# Patient Record
Sex: FEMALE | Race: WHITE | NOT HISPANIC OR LATINO | Employment: OTHER | URBAN - METROPOLITAN AREA
[De-identification: names, ages, dates, MRNs, and addresses within clinical notes are randomized per-mention and may not be internally consistent; named-entity substitution may affect disease eponyms.]

---

## 2017-02-21 ENCOUNTER — GENERIC CONVERSION - ENCOUNTER (OUTPATIENT)
Dept: OTHER | Facility: OTHER | Age: 52
End: 2017-02-21

## 2017-02-21 LAB
25(OH)D3 SERPL-MCNC: 24.8 NG/ML (ref 30–100)
AMBIG ABBREV CMP14 DEFAULT (HISTORICAL): NORMAL
CHOLEST SERPL-MCNC: 238 MG/DL (ref 100–199)
HDLC SERPL-MCNC: 40 MG/DL
LDL/HDL RATIO (HISTORICAL): 3.4 RATIO UNITS (ref 0–3.2)
LDLC SERPL CALC-MCNC: 134 MG/DL (ref 0–99)
TRIGL SERPL-MCNC: 320 MG/DL (ref 0–149)
VLDLC SERPL CALC-MCNC: 64 MG/DL (ref 5–40)

## 2017-02-22 ENCOUNTER — GENERIC CONVERSION - ENCOUNTER (OUTPATIENT)
Dept: OTHER | Facility: OTHER | Age: 52
End: 2017-02-22

## 2017-02-22 LAB
T4 FREE SERPL-MCNC: 0.78 NG/DL (ref 0.82–1.77)
TSH SERPL DL<=0.05 MIU/L-ACNC: 1.82 UIU/ML (ref 0.45–4.5)
WRITTEN AUTHORIZATION (HISTORICAL): NORMAL

## 2017-02-23 ENCOUNTER — ALLSCRIPTS OFFICE VISIT (OUTPATIENT)
Dept: OTHER | Facility: OTHER | Age: 52
End: 2017-02-23

## 2017-02-23 DIAGNOSIS — Z12.31 ENCOUNTER FOR SCREENING MAMMOGRAM FOR MALIGNANT NEOPLASM OF BREAST: ICD-10-CM

## 2017-02-23 LAB
A/G RATIO (HISTORICAL): 1.7 (ref 1.1–2.5)
ALBUMIN SERPL BCP-MCNC: 4.2 G/DL (ref 3.5–5.5)
ALP SERPL-CCNC: 51 IU/L (ref 39–117)
ALT SERPL W P-5'-P-CCNC: 21 IU/L (ref 0–32)
AST SERPL W P-5'-P-CCNC: 19 IU/L (ref 0–40)
BILIRUB SERPL-MCNC: <0.2 MG/DL (ref 0–1.2)
BUN SERPL-MCNC: 16 MG/DL (ref 6–24)
BUN/CREA RATIO (HISTORICAL): 21 (ref 9–23)
CALCIUM SERPL-MCNC: 9.1 MG/DL (ref 8.7–10.2)
CHLORIDE SERPL-SCNC: 104 MMOL/L (ref 96–106)
CO2 SERPL-SCNC: 22 MMOL/L (ref 18–29)
CREAT SERPL-MCNC: 0.76 MG/DL (ref 0.57–1)
EGFR AFRICAN AMERICAN (HISTORICAL): 105 ML/MIN/1.73
EGFR-AMERICAN CALC (HISTORICAL): 91 ML/MIN/1.73
GLUCOSE SERPL-MCNC: 111 MG/DL (ref 65–99)
POTASSIUM SERPL-SCNC: 4.5 MMOL/L (ref 3.5–5.2)
SODIUM SERPL-SCNC: 140 MMOL/L (ref 134–144)
TOT. GLOBULIN, SERUM (HISTORICAL): 2.5 G/DL (ref 1.5–4.5)
TOTAL PROTEIN (HISTORICAL): 6.7 G/DL (ref 6–8.5)

## 2017-03-08 ENCOUNTER — GENERIC CONVERSION - ENCOUNTER (OUTPATIENT)
Dept: OTHER | Facility: OTHER | Age: 52
End: 2017-03-08

## 2017-03-08 LAB
EST. AVERAGE GLUCOSE BLD GHB EST-MCNC: 120 MG/DL
HBA1C MFR BLD HPLC: 5.8 %HB

## 2017-03-30 ENCOUNTER — ALLSCRIPTS OFFICE VISIT (OUTPATIENT)
Dept: OTHER | Facility: OTHER | Age: 52
End: 2017-03-30

## 2017-06-29 ENCOUNTER — ALLSCRIPTS OFFICE VISIT (OUTPATIENT)
Dept: OTHER | Facility: OTHER | Age: 52
End: 2017-06-29

## 2017-06-29 DIAGNOSIS — R06.02 SHORTNESS OF BREATH: ICD-10-CM

## 2017-06-29 DIAGNOSIS — R07.9 CHEST PAIN: ICD-10-CM

## 2017-07-13 ENCOUNTER — GENERIC CONVERSION - ENCOUNTER (OUTPATIENT)
Dept: OTHER | Facility: OTHER | Age: 52
End: 2017-07-13

## 2017-07-13 LAB
A/G RATIO (HISTORICAL): 1.7 (ref 1.2–2.2)
ALBUMIN SERPL BCP-MCNC: 4.3 G/DL (ref 3.5–5.5)
ALP SERPL-CCNC: 55 IU/L (ref 39–117)
ALT SERPL W P-5'-P-CCNC: 34 IU/L (ref 0–32)
AMBIG ABBREV LP DEFAULT (HISTORICAL): NORMAL
AST SERPL W P-5'-P-CCNC: 22 IU/L (ref 0–40)
BILIRUB SERPL-MCNC: <0.2 MG/DL (ref 0–1.2)
BUN SERPL-MCNC: 20 MG/DL (ref 6–24)
BUN/CREA RATIO (HISTORICAL): 24 (ref 9–23)
CALCIUM SERPL-MCNC: 9.1 MG/DL (ref 8.7–10.2)
CHLORIDE SERPL-SCNC: 103 MMOL/L (ref 96–106)
CHOLEST SERPL-MCNC: 218 MG/DL (ref 100–199)
CREAT SERPL-MCNC: 0.85 MG/DL (ref 0.57–1)
EGFR AFRICAN AMERICAN (HISTORICAL): 92 ML/MIN/1.73
EGFR-AMERICAN CALC (HISTORICAL): 80 ML/MIN/1.73
GLUCOSE SERPL-MCNC: 93 MG/DL (ref 65–99)
HBA1C MFR BLD HPLC: 5.7 % (ref 4.8–5.6)
HDLC SERPL-MCNC: 37 MG/DL
LDLC SERPL CALC-MCNC: 115 MG/DL (ref 0–99)
POTASSIUM SERPL-SCNC: 4.4 MMOL/L (ref 3.5–5.2)
SODIUM SERPL-SCNC: 139 MMOL/L (ref 134–144)
TOT. GLOBULIN, SERUM (HISTORICAL): 2.5 G/DL (ref 1.5–4.5)
TOTAL PROTEIN (HISTORICAL): 6.8 G/DL (ref 6–8.5)
TRIGL SERPL-MCNC: 331 MG/DL (ref 0–149)
VLDLC SERPL CALC-MCNC: 66 MG/DL (ref 5–40)

## 2017-07-14 ENCOUNTER — GENERIC CONVERSION - ENCOUNTER (OUTPATIENT)
Dept: OTHER | Facility: OTHER | Age: 52
End: 2017-07-14

## 2017-11-01 ENCOUNTER — GENERIC CONVERSION - ENCOUNTER (OUTPATIENT)
Dept: OTHER | Facility: OTHER | Age: 52
End: 2017-11-01

## 2018-01-13 VITALS
HEART RATE: 100 BPM | WEIGHT: 238 LBS | DIASTOLIC BLOOD PRESSURE: 76 MMHG | BODY MASS INDEX: 37.35 KG/M2 | SYSTOLIC BLOOD PRESSURE: 114 MMHG | HEIGHT: 67 IN | RESPIRATION RATE: 18 BRPM

## 2018-01-14 ENCOUNTER — HOSPITAL ENCOUNTER (INPATIENT)
Facility: HOSPITAL | Age: 53
LOS: 1 days | Discharge: HOME | DRG: 287 | End: 2018-01-17
Attending: EMERGENCY MEDICINE | Admitting: INTERNAL MEDICINE
Payer: MEDICARE

## 2018-01-14 ENCOUNTER — APPOINTMENT (EMERGENCY)
Dept: RADIOLOGY | Facility: HOSPITAL | Age: 53
DRG: 287 | End: 2018-01-14
Payer: MEDICARE

## 2018-01-14 VITALS
HEART RATE: 76 BPM | TEMPERATURE: 96.7 F | SYSTOLIC BLOOD PRESSURE: 110 MMHG | RESPIRATION RATE: 12 BRPM | WEIGHT: 239.13 LBS | BODY MASS INDEX: 37.49 KG/M2 | DIASTOLIC BLOOD PRESSURE: 78 MMHG

## 2018-01-14 VITALS
HEART RATE: 100 BPM | RESPIRATION RATE: 18 BRPM | WEIGHT: 232.38 LBS | BODY MASS INDEX: 36.47 KG/M2 | DIASTOLIC BLOOD PRESSURE: 86 MMHG | SYSTOLIC BLOOD PRESSURE: 128 MMHG | HEIGHT: 67 IN

## 2018-01-14 DIAGNOSIS — R94.39 ABNORMAL STRESS TEST: ICD-10-CM

## 2018-01-14 DIAGNOSIS — R07.9 CHEST PAIN, UNSPECIFIED TYPE: Primary | ICD-10-CM

## 2018-01-14 PROBLEM — I10 HYPERTENSION: Status: ACTIVE | Noted: 2018-01-14

## 2018-01-14 PROBLEM — E78.5 HYPERLIPIDEMIA: Status: ACTIVE | Noted: 2018-01-14

## 2018-01-14 LAB
ANION GAP SERPL CALCULATED.3IONS-SCNC: 11 MMOL/L (ref 4–13)
BASOPHILS # BLD AUTO: 0.08 THOUSANDS/ΜL (ref 0–0.1)
BASOPHILS NFR BLD AUTO: 1 % (ref 0–1)
BUN SERPL-MCNC: 13 MG/DL (ref 5–25)
CALCIUM SERPL-MCNC: 9.1 MG/DL (ref 8.3–10.1)
CHLORIDE SERPL-SCNC: 105 MMOL/L (ref 100–108)
CO2 SERPL-SCNC: 24 MMOL/L (ref 21–32)
CREAT SERPL-MCNC: 0.89 MG/DL (ref 0.6–1.3)
EOSINOPHIL # BLD AUTO: 0.28 THOUSAND/ΜL (ref 0–0.61)
EOSINOPHIL NFR BLD AUTO: 3 % (ref 0–6)
ERYTHROCYTE [DISTWIDTH] IN BLOOD BY AUTOMATED COUNT: 12.6 % (ref 11.6–15.1)
GFR SERPL CREATININE-BSD FRML MDRD: 75 ML/MIN/1.73SQ M
GLUCOSE SERPL-MCNC: 100 MG/DL (ref 65–140)
HCT VFR BLD AUTO: 40.9 % (ref 34.8–46.1)
HGB BLD-MCNC: 13.8 G/DL (ref 11.5–15.4)
LYMPHOCYTES # BLD AUTO: 2.28 THOUSANDS/ΜL (ref 0.6–4.47)
LYMPHOCYTES NFR BLD AUTO: 22 % (ref 14–44)
MCH RBC QN AUTO: 29.1 PG (ref 26.8–34.3)
MCHC RBC AUTO-ENTMCNC: 33.7 G/DL (ref 31.4–37.4)
MCV RBC AUTO: 86 FL (ref 82–98)
MONOCYTES # BLD AUTO: 0.85 THOUSAND/ΜL (ref 0.17–1.22)
MONOCYTES NFR BLD AUTO: 8 % (ref 4–12)
NEUTROPHILS # BLD AUTO: 6.8 THOUSANDS/ΜL (ref 1.85–7.62)
NEUTS SEG NFR BLD AUTO: 66 % (ref 43–75)
PLATELET # BLD AUTO: 302 THOUSANDS/UL (ref 149–390)
PLATELET # BLD AUTO: 339 THOUSANDS/UL (ref 149–390)
PMV BLD AUTO: 10.9 FL (ref 8.9–12.7)
PMV BLD AUTO: 11.6 FL (ref 8.9–12.7)
POTASSIUM SERPL-SCNC: 4.1 MMOL/L (ref 3.5–5.3)
RBC # BLD AUTO: 4.74 MILLION/UL (ref 3.81–5.12)
SODIUM SERPL-SCNC: 140 MMOL/L (ref 136–145)
TROPONIN I SERPL-MCNC: <0.02 NG/ML
TSH SERPL DL<=0.05 MIU/L-ACNC: 3.02 UIU/ML (ref 0.36–3.74)
WBC # BLD AUTO: 10.29 THOUSAND/UL (ref 4.31–10.16)

## 2018-01-14 PROCEDURE — 84484 ASSAY OF TROPONIN QUANT: CPT | Performed by: EMERGENCY MEDICINE

## 2018-01-14 PROCEDURE — 99285 EMERGENCY DEPT VISIT HI MDM: CPT

## 2018-01-14 PROCEDURE — 71045 X-RAY EXAM CHEST 1 VIEW: CPT

## 2018-01-14 PROCEDURE — 93005 ELECTROCARDIOGRAM TRACING: CPT | Performed by: EMERGENCY MEDICINE

## 2018-01-14 PROCEDURE — 93005 ELECTROCARDIOGRAM TRACING: CPT

## 2018-01-14 PROCEDURE — 36415 COLL VENOUS BLD VENIPUNCTURE: CPT | Performed by: EMERGENCY MEDICINE

## 2018-01-14 PROCEDURE — 84443 ASSAY THYROID STIM HORMONE: CPT | Performed by: INTERNAL MEDICINE

## 2018-01-14 PROCEDURE — 85049 AUTOMATED PLATELET COUNT: CPT | Performed by: INTERNAL MEDICINE

## 2018-01-14 PROCEDURE — 80048 BASIC METABOLIC PNL TOTAL CA: CPT | Performed by: EMERGENCY MEDICINE

## 2018-01-14 PROCEDURE — 85025 COMPLETE CBC W/AUTO DIFF WBC: CPT | Performed by: EMERGENCY MEDICINE

## 2018-01-14 RX ORDER — CLONAZEPAM 0.5 MG/1
0.5 TABLET ORAL
Status: DISCONTINUED | OUTPATIENT
Start: 2018-01-14 | End: 2018-01-17 | Stop reason: HOSPADM

## 2018-01-14 RX ORDER — FLUOXETINE HYDROCHLORIDE 40 MG/1
1 CAPSULE ORAL DAILY
COMMUNITY
Start: 2011-07-13

## 2018-01-14 RX ORDER — CHOLECALCIFEROL (VITAMIN D3) 10 MCG
1 TABLET ORAL DAILY
Status: DISCONTINUED | OUTPATIENT
Start: 2018-01-15 | End: 2018-01-17 | Stop reason: HOSPADM

## 2018-01-14 RX ORDER — MORPHINE SULFATE 2 MG/ML
2 INJECTION, SOLUTION INTRAMUSCULAR; INTRAVENOUS EVERY 4 HOURS PRN
Status: DISCONTINUED | OUTPATIENT
Start: 2018-01-14 | End: 2018-01-17 | Stop reason: HOSPADM

## 2018-01-14 RX ORDER — ASPIRIN 81 MG/1
162 TABLET, CHEWABLE ORAL ONCE
Status: COMPLETED | OUTPATIENT
Start: 2018-01-14 | End: 2018-01-14

## 2018-01-14 RX ORDER — MELATONIN
1000 DAILY
Status: DISCONTINUED | OUTPATIENT
Start: 2018-01-15 | End: 2018-01-17 | Stop reason: HOSPADM

## 2018-01-14 RX ORDER — BUPROPION HYDROCHLORIDE 100 MG/1
200 TABLET, EXTENDED RELEASE ORAL DAILY
Status: DISCONTINUED | OUTPATIENT
Start: 2018-01-15 | End: 2018-01-17 | Stop reason: HOSPADM

## 2018-01-14 RX ORDER — CALCIUM CARBONATE 500(1250)
1 TABLET ORAL
Status: DISCONTINUED | OUTPATIENT
Start: 2018-01-15 | End: 2018-01-17 | Stop reason: HOSPADM

## 2018-01-14 RX ORDER — HEPARIN SODIUM 5000 [USP'U]/ML
5000 INJECTION, SOLUTION INTRAVENOUS; SUBCUTANEOUS EVERY 8 HOURS SCHEDULED
Status: DISCONTINUED | OUTPATIENT
Start: 2018-01-14 | End: 2018-01-17 | Stop reason: HOSPADM

## 2018-01-14 RX ORDER — LABETALOL 100 MG/1
1 TABLET, FILM COATED ORAL EVERY 12 HOURS
COMMUNITY
Start: 2016-06-30 | End: 2018-06-07 | Stop reason: SDUPTHER

## 2018-01-14 RX ORDER — CLONAZEPAM 0.5 MG/1
1 TABLET ORAL
COMMUNITY
Start: 2017-06-29 | End: 2020-02-17 | Stop reason: ALTCHOICE

## 2018-01-14 RX ORDER — GEMFIBROZIL 600 MG/1
1 TABLET, FILM COATED ORAL 2 TIMES DAILY
COMMUNITY
Start: 2012-06-27 | End: 2018-06-07 | Stop reason: SDUPTHER

## 2018-01-14 RX ORDER — ONDANSETRON 2 MG/ML
4 INJECTION INTRAMUSCULAR; INTRAVENOUS EVERY 4 HOURS PRN
Status: DISCONTINUED | OUTPATIENT
Start: 2018-01-14 | End: 2018-01-17 | Stop reason: HOSPADM

## 2018-01-14 RX ORDER — GEMFIBROZIL 600 MG/1
600 TABLET, FILM COATED ORAL 2 TIMES DAILY
Status: DISCONTINUED | OUTPATIENT
Start: 2018-01-14 | End: 2018-01-17 | Stop reason: HOSPADM

## 2018-01-14 RX ORDER — ACETAMINOPHEN 325 MG/1
650 TABLET ORAL EVERY 6 HOURS PRN
Status: DISCONTINUED | OUTPATIENT
Start: 2018-01-14 | End: 2018-01-17 | Stop reason: HOSPADM

## 2018-01-14 RX ORDER — ASPIRIN 81 MG/1
81 TABLET, CHEWABLE ORAL DAILY
Status: DISCONTINUED | OUTPATIENT
Start: 2018-01-15 | End: 2018-01-16

## 2018-01-14 RX ORDER — FLUOXETINE HYDROCHLORIDE 20 MG/1
40 CAPSULE ORAL DAILY
Status: DISCONTINUED | OUTPATIENT
Start: 2018-01-15 | End: 2018-01-17 | Stop reason: HOSPADM

## 2018-01-14 RX ORDER — NITROGLYCERIN 0.4 MG/1
0.4 TABLET SUBLINGUAL
Status: DISCONTINUED | OUTPATIENT
Start: 2018-01-14 | End: 2018-01-17 | Stop reason: HOSPADM

## 2018-01-14 RX ORDER — LABETALOL 100 MG/1
100 TABLET, FILM COATED ORAL EVERY 12 HOURS
Status: DISCONTINUED | OUTPATIENT
Start: 2018-01-14 | End: 2018-01-17 | Stop reason: HOSPADM

## 2018-01-14 RX ORDER — BUPROPION HYDROCHLORIDE 200 MG/1
1 TABLET, EXTENDED RELEASE ORAL DAILY
COMMUNITY
Start: 2017-06-29

## 2018-01-14 RX ORDER — BIOTIN 1 MG
1 TABLET ORAL DAILY
COMMUNITY
End: 2018-12-10 | Stop reason: ALTCHOICE

## 2018-01-14 RX ADMIN — ACETAMINOPHEN 650 MG: 325 TABLET, FILM COATED ORAL at 21:58

## 2018-01-14 RX ADMIN — LABETALOL HYDROCHLORIDE 100 MG: 100 TABLET, FILM COATED ORAL at 21:59

## 2018-01-14 RX ADMIN — ASPIRIN 81 MG 162 MG: 81 TABLET ORAL at 15:44

## 2018-01-14 RX ADMIN — ONDANSETRON 4 MG: 2 INJECTION INTRAMUSCULAR; INTRAVENOUS at 21:59

## 2018-01-14 RX ADMIN — GEMFIBROZIL 600 MG: 600 TABLET ORAL at 21:58

## 2018-01-14 RX ADMIN — CLONAZEPAM 0.5 MG: 0.5 TABLET ORAL at 21:59

## 2018-01-14 NOTE — ED PROVIDER NOTES
History  Chief Complaint   Patient presents with    Chest Pain     pt reports chest heaviness that has come and gone since xmas frieda  Pt states she has left sided chest pain that radiates to left arm  Left arm feels like pins and needles  47 yr feamle with  2 week hx of extetional cp- with farley/ fatigue decreased exercise toelrant- dizziness on exertion- but no near syncope- vertigo--  approx 1 hr ago with same anterior chest ptressure nwo at rest with tinglign down left arm which is new-- no abrupt onset of cp/ no pleuritic tyep pain - no hx of vte- no gerd/dsypesia- mleean-         History provided by:  Patient and spouse   used: No        None       Past Medical History:   Diagnosis Date    Anxiety     Hypertension        Past Surgical History:   Procedure Laterality Date    ENDOMETRIAL ABLATION      TONSILLECTOMY         No family history on file  I have reviewed and agree with the history as documented  Social History   Substance Use Topics    Smoking status: Former Smoker     Quit date: 12/23/2017    Smokeless tobacco: Never Used    Alcohol use No        Review of Systems   Constitutional: Positive for fatigue  Negative for activity change, appetite change, chills, diaphoresis, fever and unexpected weight change  HENT: Negative  Eyes: Negative  Respiratory: Positive for shortness of breath  Negative for apnea, cough, choking, chest tightness, wheezing and stridor  Cardiovascular: Positive for chest pain  Negative for palpitations and leg swelling  Gastrointestinal: Negative  Endocrine: Negative  Genitourinary: Negative  Musculoskeletal: Negative  Skin: Negative  Allergic/Immunologic: Negative  Neurological: Positive for dizziness  Negative for tremors, seizures, syncope, facial asymmetry, speech difficulty, weakness, light-headedness, numbness and headaches  Hematological: Negative  Psychiatric/Behavioral: Negative          Physical Exam  ED Triage Vitals [01/14/18 1454]   Temperature Pulse Respirations Blood Pressure SpO2   97 6 °F (36 4 °C) 93 18 163/66 97 %      Temp Source Heart Rate Source Patient Position - Orthostatic VS BP Location FiO2 (%)   Oral Monitor Lying Right arm --      Pain Score       6           Orthostatic Vital Signs  Vitals:    01/14/18 1454   BP: 163/66   Pulse: 93   Patient Position - Orthostatic VS: Lying       Physical Exam   Constitutional: She is oriented to person, place, and time  She appears well-developed and well-nourished  No distress  avss-- pulse ox  97 % on ra- intepretation is normal- no intervention    HENT:   Head: Normocephalic and atraumatic  Eyes: Conjunctivae and EOM are normal  Pupils are equal, round, and reactive to light  Right eye exhibits no discharge  Left eye exhibits no discharge  No scleral icterus  Mm pink   Neck: Normal range of motion  Neck supple  No JVD present  No tracheal deviation present  No thyromegaly present  Cardiovascular: Normal rate, regular rhythm, normal heart sounds and intact distal pulses  Exam reveals no gallop and no friction rub  No murmur heard  Pulmonary/Chest: Effort normal and breath sounds normal  No stridor  No respiratory distress  She has no wheezes  She has no rales  She exhibits no tenderness  Abdominal: Soft  Bowel sounds are normal  She exhibits no distension and no mass  There is no tenderness  There is no rebound and no guarding  No hernia  Musculoskeletal: Normal range of motion  She exhibits no edema, tenderness or deformity  Normal equal bilateral radial/dp pulses- no ble edema/claf tenderness/assym/ erythema   Lymphadenopathy:     She has no cervical adenopathy  Neurological: She is alert and oriented to person, place, and time  No cranial nerve deficit or sensory deficit  She exhibits normal muscle tone  Coordination normal    No nystagmus- normal finger to nose bialterally- normal gait   Skin: Skin is warm   Capillary refill takes less than 2 seconds  No rash noted  She is not diaphoretic  No erythema  No pallor  Psychiatric: She has a normal mood and affect  Her behavior is normal  Judgment and thought content normal    Nursing note and vitals reviewed  ED Medications  Medications   aspirin chewable tablet 162 mg (not administered)       Diagnostic Studies  Results Reviewed     Procedure Component Value Units Date/Time    CBC and differential [[id number]]     Lab Status:  No result Specimen:  Blood     Basic metabolic panel [[id number]]     Lab Status:  No result Specimen:  Blood     Troponin I [[id number]]     Lab Status:  No result Specimen:  Blood                  XR chest 1 view portable    (Results Pending)              Procedures  Procedures       Phone Contacts  ED Phone Contact    ED Course  ED Course as of Jan 14 1715   Sun Jan 14, 2018   1552 CXR PORTABLE- NORMAL MEDIASTINUM- NO EVIDENCE OF FREE/SQ AIR- NO INFILTRATE/ PTX/ PULM EDEMA/ PLEURAL EFFUSIONS    1633 ER MD NOTE- PT AND WIFE OFFERED HOSP admit and dx workup but not promised any provocative testing for [place] type picture -- they want to discuss this among themselves- will check back    1709 Er md note- pt agrees to hosp admit-- again er md did not promise stress test during  this admit- pt and  aware of this                                MDM  CritCare Time    Disposition  Final diagnoses:   None     ED Disposition     None      Follow-up Information    None       Patient's Medications    No medications on file     No discharge procedures on file      ED Provider  Electronically Signed by           Parris Isaac MD  01/14/18 9466

## 2018-01-14 NOTE — ED PROCEDURE NOTE
PROCEDURE  ECG 12 Lead Documentation  Date/Time: 1/14/2018 3:12 PM  Performed by: Samra Astudillo  Authorized by: Albino CRABTREE     Indications / Diagnosis:  Cp  ECG reviewed by me, the ED Provider: yes    Patient location:  ED and bedside  Previous ECG:     Previous ECG:  Unavailable  Interpretation:     Interpretation: non-specific    Rate:     ECG rate:  94    ECG rate assessment: normal    Rhythm:     Rhythm: sinus rhythm    Ectopy:     Ectopy: none    QRS:     QRS axis:  Normal    QRS intervals:  Normal  Conduction:     Conduction: normal    ST segments:     ST segments:  Normal  T waves:     T waves: flattening      Flattening:  III and V1  Q waves:     Q waves:  III and V1  Comments:      No ecg signs of ischemia/ INJURY / Shayla Cox MD  01/14/18 8192

## 2018-01-15 ENCOUNTER — APPOINTMENT (OUTPATIENT)
Dept: NON INVASIVE DIAGNOSTICS | Facility: HOSPITAL | Age: 53
DRG: 287 | End: 2018-01-15
Payer: MEDICARE

## 2018-01-15 PROBLEM — F41.8 DEPRESSION WITH ANXIETY: Status: ACTIVE | Noted: 2018-01-15

## 2018-01-15 LAB
ATRIAL RATE: 94 BPM
CHOLEST SERPL-MCNC: 200 MG/DL (ref 50–200)
ERYTHROCYTE [DISTWIDTH] IN BLOOD BY AUTOMATED COUNT: 12.7 % (ref 11.6–15.1)
EST. AVERAGE GLUCOSE BLD GHB EST-MCNC: 123 MG/DL
HBA1C MFR BLD: 5.9 % (ref 4.2–6.3)
HCT VFR BLD AUTO: 41 % (ref 34.8–46.1)
HDLC SERPL-MCNC: 36 MG/DL (ref 40–60)
HGB BLD-MCNC: 13.2 G/DL (ref 11.5–15.4)
LDLC SERPL CALC-MCNC: 100 MG/DL (ref 0–100)
MAGNESIUM SERPL-MCNC: 2.1 MG/DL (ref 1.6–2.6)
MCH RBC QN AUTO: 27.9 PG (ref 26.8–34.3)
MCHC RBC AUTO-ENTMCNC: 32.2 G/DL (ref 31.4–37.4)
MCV RBC AUTO: 87 FL (ref 82–98)
P AXIS: 63 DEGREES
PHOSPHATE SERPL-MCNC: 4 MG/DL (ref 2.7–4.5)
PLATELET # BLD AUTO: 312 THOUSANDS/UL (ref 149–390)
PMV BLD AUTO: 11 FL (ref 8.9–12.7)
PR INTERVAL: 176 MS
QRS AXIS: 45 DEGREES
QRSD INTERVAL: 78 MS
QT INTERVAL: 326 MS
QTC INTERVAL: 407 MS
RBC # BLD AUTO: 4.73 MILLION/UL (ref 3.81–5.12)
T WAVE AXIS: 42 DEGREES
TRIGL SERPL-MCNC: 319 MG/DL
VENTRICULAR RATE: 94 BPM
WBC # BLD AUTO: 9.59 THOUSAND/UL (ref 4.31–10.16)

## 2018-01-15 PROCEDURE — 85027 COMPLETE CBC AUTOMATED: CPT | Performed by: INTERNAL MEDICINE

## 2018-01-15 PROCEDURE — 80061 LIPID PANEL: CPT | Performed by: INTERNAL MEDICINE

## 2018-01-15 PROCEDURE — 93306 TTE W/DOPPLER COMPLETE: CPT

## 2018-01-15 PROCEDURE — 84100 ASSAY OF PHOSPHORUS: CPT | Performed by: INTERNAL MEDICINE

## 2018-01-15 PROCEDURE — 83735 ASSAY OF MAGNESIUM: CPT | Performed by: INTERNAL MEDICINE

## 2018-01-15 PROCEDURE — 83036 HEMOGLOBIN GLYCOSYLATED A1C: CPT | Performed by: INTERNAL MEDICINE

## 2018-01-15 RX ADMIN — LABETALOL HYDROCHLORIDE 100 MG: 100 TABLET, FILM COATED ORAL at 19:51

## 2018-01-15 RX ADMIN — CHOLECALCIFEROL TAB 25 MCG (1000 UNIT) 1000 UNITS: 25 TAB at 09:19

## 2018-01-15 RX ADMIN — NEPHROCAP 1 CAPSULE: 1 CAP ORAL at 09:19

## 2018-01-15 RX ADMIN — HEPARIN SODIUM 5000 UNITS: 5000 INJECTION, SOLUTION INTRAVENOUS; SUBCUTANEOUS at 21:29

## 2018-01-15 RX ADMIN — Medication 1 TABLET: at 07:35

## 2018-01-15 RX ADMIN — BUPROPION HYDROCHLORIDE 200 MG: 100 TABLET, FILM COATED, EXTENDED RELEASE ORAL at 09:19

## 2018-01-15 RX ADMIN — GEMFIBROZIL 600 MG: 600 TABLET ORAL at 17:46

## 2018-01-15 RX ADMIN — GEMFIBROZIL 600 MG: 600 TABLET ORAL at 09:19

## 2018-01-15 RX ADMIN — ACETAMINOPHEN 650 MG: 325 TABLET, FILM COATED ORAL at 07:34

## 2018-01-15 RX ADMIN — ASPIRIN 81 MG 81 MG: 81 TABLET ORAL at 09:19

## 2018-01-15 RX ADMIN — CLONAZEPAM 0.5 MG: 0.5 TABLET ORAL at 21:29

## 2018-01-15 RX ADMIN — HEPARIN SODIUM 5000 UNITS: 5000 INJECTION, SOLUTION INTRAVENOUS; SUBCUTANEOUS at 14:11

## 2018-01-15 RX ADMIN — LABETALOL HYDROCHLORIDE 100 MG: 100 TABLET, FILM COATED ORAL at 07:35

## 2018-01-15 RX ADMIN — FLUOXETINE 40 MG: 20 CAPSULE ORAL at 09:18

## 2018-01-15 RX ADMIN — ACETAMINOPHEN 650 MG: 325 TABLET, FILM COATED ORAL at 19:53

## 2018-01-15 NOTE — RESULT NOTES
Verified Results  (LC) Hemoglobin A1c [id number] 09:37AM Lai Jordy     Test Name Result Flag Reference   Hemoglobin A1c 5 8 %Hb     Reference Range:  American Diabetes Association (ADA) Guidelines:  <5 7: Decreased risk for diabetes  5 7 - 6 4: Increased risk for diabetes  >6 4: Ongoing Hyperglycemia of any cause  <7 0: Glycemic control for adults with diabetes   Estimated Average Glucose 120 mg/dL         Discussion/Summary   BORDERLINE BUT NOT IN DIABETIC RANGE YET      [facility]

## 2018-01-15 NOTE — PLAN OF CARE
Problem: PAIN - ADULT  Goal: Verbalizes/displays adequate comfort level or baseline comfort level  Interventions:  - Encourage patient to monitor pain and request assistance  - Assess pain using appropriate pain scale  - Administer analgesics based on type and severity of pain and evaluate response  - Implement non-pharmacological measures as appropriate and evaluate response  - Consider cultural and social influences on pain and pain management  - Notify physician/advanced practitioner if interventions unsuccessful or patient reports new pain  Outcome: Progressing      Problem: INFECTION - ADULT  Goal: Absence or prevention of progression during hospitalization  INTERVENTIONS:  - Assess and monitor for signs and symptoms of infection  - Monitor lab/diagnostic results  - Monitor all insertion sites, i e  indwelling lines, tubes, and drains  - Monitor endotracheal (as able) and nasal secretions for changes in amount and color  - [place] appropriate cooling/warming therapies per order  - Administer medications as ordered  - Instruct and encourage patient and family to use good hand hygiene technique  - Identify and instruct in appropriate isolation precautions for identified infection/condition  Outcome: Progressing    Goal: Absence of fever/infection during neutropenic period  INTERVENTIONS:  - Monitor WBC  - Implement neutropenic guidelines  Outcome: Progressing      Problem: SAFETY ADULT  Goal: Patient will remain free of falls  INTERVENTIONS:  - Assess patient frequently for physical needs  -  Identify cognitive and physical deficits and behaviors that affect risk of falls    -  [place] fall precautions as indicated by assessment   - Educate patient/family on patient safety including physical limitations  - Instruct patient to call for assistance with activity based on assessment  - Modify environment to reduce risk of injury  - Consider OT/PT consult to assist with strengthening/mobility  Outcome: Progressing    Goal: Maintain or return to baseline ADL function  INTERVENTIONS:  -  Assess patient's ability to carry out ADLs; assess patient's baseline for ADL function and identify physical deficits which impact ability to perform ADLs (bathing, care of mouth/teeth, toileting, grooming, dressing, etc )  - Assess/evaluate cause of self-care deficits   - Assess range of motion  - Assess patient's mobility; develop plan if impaired  - Assess patient's need for assistive devices and provide as appropriate  - Encourage maximum independence but intervene and supervise when necessary  ¯ Involve family in performance of ADLs  ¯ Assess for home care needs following discharge   ¯ Request OT consult to assist with ADL evaluation and planning for discharge  ¯ Provide patient education as appropriate  Outcome: Progressing    Goal: Maintain or return mobility status to optimal level  INTERVENTIONS:  - Assess patient's baseline mobility status (ambulation, transfers, stairs, etc )    - Identify cognitive and physical deficits and behaviors that affect mobility  - Identify mobility aids required to assist with transfers and/or ambulation (gait belt, sit-to-stand, lift, walker, cane, etc )  - [place] fall precautions as indicated by assessment  - Record patient progress and toleration of activity level on Mobility SBAR; progress patient to next Phase/Stage  - Instruct patient to call for assistance with activity based on assessment  - Request Rehabilitation consult to assist with strengthening/weightbearing, etc   Outcome: Progressing      Problem: DISCHARGE PLANNING  Goal: Discharge to home or other facility with appropriate resources  INTERVENTIONS:  - Identify barriers to discharge w/patient and caregiver  - Arrange for needed discharge resources and transportation as appropriate  - Identify discharge learning needs (meds, wound care, etc )  - Arrange for interpretive services to assist at discharge as needed  - Refer to Case Management Department for coordinating discharge planning if the patient needs post-hospital services based on physician/advanced practitioner order or complex needs related to functional status, cognitive ability, or social support system  Outcome: Progressing      Problem: Knowledge Deficit  Goal: Patient/family/caregiver demonstrates understanding of disease process, treatment plan, medications, and discharge instructions  Complete learning assessment and assess knowledge base    Interventions:  - Provide teaching at level of understanding  - Provide teaching via preferred learning methods  Outcome: Progressing

## 2018-01-15 NOTE — H&P
History & Physical - [facility] - Internal Medicine      PATIENT INFORMATION      Morgan Carcamo 47 y o  female MRN: [id number]  Unit/Bed#: ED 25 Encounter: [id number]  Admitting Physician: Loretta Conrad MD  PCP: Kary Giraldo DO  Date of Admission:  01/14/18      CHIEF COMPLAINT      Chest pain with shortness of breath      HISTORY OF PRESENT ILLNESS      Morgan Carcamo is a 47 y o  female who presents with progressively worsening shortness of breath with exertion over the last few weeks which accumulated and now presents with associated chest pain with atypical radiation to the right jaw and lower back  She states that over the last few weeks she was in [address] [place] [address] that this may be serious symptoms of a greater issue but due to the radiation of pain to various parts of her body as aforementioned above, she decided to present to the ER for further evaluation  She acknowledges a significant family history of coronary artery disease in various family members in both her maternal and paternal side  She notes some occasional associated dizziness but denies any nausea/vomiting or other constitutional symptoms at this time  She states that she currently does suffer from depression/anxiety and feels some of her symptoms may be stress induced due to issues at home  Upon my encounter in the ER, she is resting somewhat comfortably but does exhibit intermittent anxiety  A family member is present at bedside  Once again, she does express concern due to a severe history of coronary artery disease and the high cholesterol in various family members  She notes that over the last few weeks as well, she has been getting increasingly winded with movement/activity  No other complaints at this time  REVIEW OF SYSTEMS      Review of Systems - A thorough 12 point review systems was conducted    Pertinent positives and negatives are mentioned in the history of present illness  PAST MEDICAL & SURGICAL HISTORY      Past Medical History:   Diagnosis Date    Anxiety     Hyperlipidemia     Hypertension     Psychiatric disorder        Past Surgical History:   Procedure Laterality Date    ENDOMETRIAL ABLATION      TONSILLECTOMY           MEDICATIONS & ALLERGIES       Prior to Admission medications    Medication Sig Start Date End Date Taking? Authorizing Provider   B COMPLEX VITAMINS PO Take 1 tablet by mouth daily   Yes Historical Provider, MD   buPROPion (WELLBUTRIN SR) 200 MG 12 hr tablet Take 1 tablet by mouth daily 6/29/17  Yes Historical Provider, MD   Calcium Carb-Cholecalciferol (CALCIUM 600 + D PO) Take 1 tablet by mouth daily 6/29/17  Yes Historical Provider, MD   Cholecalciferol (VITAMIN D3) 1000 units CAPS Take 1 capsule by mouth daily   Yes Historical Provider, MD   clonazePAM (KlonoPIN) 0 5 mg tablet Take 1 tablet by mouth daily at bedtime 6/29/17  Yes Historical Provider, MD   Flax OIL Take 1 capsule by mouth daily   Yes Historical Provider, MD   FLUoxetine (PROzac) 40 MG capsule Take 1 capsule by mouth daily 7/13/11  Yes Historical Provider, MD   gemfibrozil (LOPID) 600 mg tablet Take 1 tablet by mouth 2 (two) times a day 6/27/12  Yes Historical Provider, MD   labetalol (NORMODYNE) 100 mg tablet Take 1 tablet by mouth every 12 (twelve) hours 6/30/16  Yes Historical Provider, MD         Allergies:    Allergies   Allergen Reactions    Penicillins Swelling         SOCIAL HISTORY         Marital Status: /Civil Union   Occupation:  Unemployed  Patient Pre-hospital Living Situation:  Lives at home  Patient Pre-hospital Level of Mobility:  Usually freely ambulates and partakes in activities of daily living    Substance Use History:   History   Alcohol Use No     History   Smoking Status    Former Smoker    Quit date: 12/23/2017   Smokeless Tobacco    Never Used     History   Drug Use No         FAMILY HISTORY      Significant for CAD, hypertension, and diabetes mellitus in various family members in both her maternal and paternal side  Significant for stroke in her father  Significant for PAD in her mother  PHYSICAL EXAM      Vitals:   Blood Pressure: 135/70 (01/14/18 1900)  Pulse: 92 (01/14/18 1900)  Temperature: 97 6 °F (36 4 °C) (01/14/18 1454)  Temp Source: Oral (01/14/18 1454)  Respirations: 18 (01/14/18 1530)  Weight - Scale: 111 kg (244 lb 11 4 oz) (01/14/18 1452)  SpO2: 97 % (01/14/18 1800)      GENERAL:  Obese - no current distress  HEAD:  Normocephalic - atraumatic  EYES: PERRL - EOMI   MOUTH:  Mucosa moist  NECK:  Supple - full range of motion  CARDIAC:  Regular rate/rhythm - S1/S2 positive - no reproducible chest pain  PULMONARY:  Clear but somewhat diminished breath sounds bilaterally - nonlabored respirations currently  ABDOMEN:  Soft - nontender/nondistended - active bowel sounds  MUSCULOSKELETAL:  Motor strength/range of motion intact  NEUROLOGIC:  Alert/oriented x 3 - cranial nerves grossly intact - no obvious focal deficits  SKIN:  Age-appropriate wrinkles/blemishes   PSYCHIATRIC:  Mood/affect quite anxious      ADDITIONAL DATA     Lab Results:       Results from last 7 days  Lab Units 01/14/18  1544   WBC Thousand/uL 10 29*   HEMOGLOBIN g/dL 13 8   HEMATOCRIT % 40 9   PLATELETS Thousands/uL 339   NEUTROS PCT % 66   LYMPHS PCT % 22   MONOS PCT % 8   EOS PCT % 3       Results from last 7 days  Lab Units 01/14/18  1544   SODIUM mmol/L 140   POTASSIUM mmol/L 4 1   CHLORIDE mmol/L 105   CO2 mmol/L 24   BUN mg/dL 13   CREATININE mg/dL 0 89   CALCIUM mg/dL 9 1   GLUCOSE RANDOM mg/dL 100           Imaging:     CXR pending      ASSESSMENTS       1  Shortness of breath - Atypical chest pain  2  Hyperlipidemia  3  Hypertension  4  Morbid obesity  5  Depression - Anxiety  6    Tobacco abuse      PLAN       · Assigned to observation with telemetry - initial cardiac enzymes negative and will trend two more sets of troponins - due to shortness of breath, will check an echocardiogram to rule out structural deformities of the heart - also check a myocardial stress test to rule out acute coronary event and circulation deficiency - keep NPO after midnight - PRN IV morphine/little NTG on board for acute chest pain - maintain oxygenation  · Already on a statin medication at home, however, will check a fasting lipid panel in the morning to ensure optimal statin dosing - continue ASA daily   · Continue Labetalol - low-sodium diet encouraged  · Lifestyle/diet modifications - calculate BMI  · Early no HI/SI - continue Wellbutrin/Prozac - continue as needed Klonopin for anxiety - patient reassured and counseled to avoid stressful/triggering symptoms  · States she recently quit smoking approximately a month ago - almost 30 pack year history - continue to encourage abstinence and will offer transdermal nicotine patch if patient desires      DVT Prophylaxis:  Heparin SC      Code Status:  Full code       Anticipated Length of Stay:  Patient will be admitted on an Observation basis with an anticipated length of stay of less than 2 midnights  Justification for Hospital Stay:  Dyspnea on exertion with chest pain to rule out an acute coronary event  Total Time for Visit, including Counseling / Coordination of Care: 38 minutes  Greater than 50% of this total time spent on direct patient counseling and coordination of care  ** Please Note: This note has been constructed using a voice recognition system   **

## 2018-01-15 NOTE — CASE MANAGEMENT
Initial Clinical Review    Admission: Date/Time/Statement:  01/14/2018 @ 17:18  Observation    Orders Placed This Encounter   Procedures    Place in Observation (expected length of stay for this patient is less than two midnights)     Standing Status:   Standing     Number of Occurrences:   1     Order Specific Question:   Admitting Physician     Answer:   Neli Waters     Order Specific Question:   Level of Care     Answer:   Med Surg [16]         ED: Date/Time/Mode of Arrival:   ED Arrival Information     Expected Arrival Acuity Means of Arrival Escorted By Service Admission Type    - 1/14/2018 14:33 Urgent Walk-In Family Member General Medicine Urgent    Arrival Complaint    High Blood Pressure; Chest Heaviness          Chief Complaint:   Chief Complaint   Patient presents with    Chest Pain     pt reports chest heaviness that has come and gone since xmas frieda  Pt states she has left sided chest pain that radiates to left arm  Left arm feels like pins and needles  History of Illness:  47 y o  female who presents with progressively worsening shortness of breath with exertion over the last few weeks which accumulated and now presents with associated chest pain with atypical radiation to the right jaw and lower back  She states that over the last few weeks she was in [address] [place] [address] that this may be serious symptoms of a greater issue but due to the radiation of pain to various parts of her body as aforementioned above, she decided to present to the ER for further evaluation  She acknowledges a significant family history of coronary artery disease in various family members in both her maternal and paternal side  She notes some occasional associated dizziness but denies any nausea/vomiting or other constitutional symptoms at this time  She states that she currently does suffer from depression/anxiety and feels some of her symptoms may be stress induced due to issues at home    Upon my encounter in the ER, she is resting somewhat comfortably but does exhibit intermittent anxiety  A family member is present at bedside  Once again, she does express concern due to a severe history of coronary artery disease and the high cholesterol in various family members  She notes that over the last few weeks as well, she has been getting increasingly winded with movement/activity  No other complaints at this time  ED Vital Signs:   ED Triage Vitals [01/14/18 1454]   Temperature Pulse Respirations Blood Pressure SpO2   97 6 °F (36 4 °C) 93 18 163/66 97 %      Temp Source Heart Rate Source Patient Position - Orthostatic VS BP Location FiO2 (%)   Oral Monitor Lying Right arm --      Pain Score       6        Wt Readings from Last 1 Encounters:   01/14/18 112 kg (246 lb 4 1 oz)     Abnormal Labs/Diagnostic Test Results: WBC 10 29     EKG: Sinus Rhythm   T waves:     T waves: flattening      Flattening:  III and V1  Q waves:     Q waves:  III and V1     ED Treatment:   Medication Administration from 01/14/2018 1433 to 01/14/2018 2004       Date/Time Order Dose Route Action Action by Comments     01/14/2018 6004 aspirin chewable tablet 162 mg 162 mg Oral Given Berta Cárdenas RN         Physical Exam   Constitutional: She is oriented to person, place, and time  She appears well-developed and well-nourished  No distress  avss-- pulse ox  97 % on ra- intepretation is normal- no intervention    Cardiovascular: Normal rate, regular rhythm, normal heart sounds and intact distal pulses  Exam reveals no gallop and no friction rub  No murmur heard  Pulmonary/Chest: Effort normal and breath sounds normal  No stridor  No respiratory distress  She has no wheezes  She has no rales  She exhibits no tenderness  Past Medical/Surgical History:    Active Ambulatory Problems     Diagnosis Date Noted    No Active Ambulatory Problems     Resolved Ambulatory Problems     Diagnosis Date Noted    No Resolved Ambulatory Problems     Past Medical History:   Diagnosis Date    Anxiety     Hyperlipidemia     Hypertension     Psychiatric disorder        Admitting Diagnosis: Chest pain [R07 9]  Chest pain, unspecified type [R07 9]    Age/Sex: 47 y o  female    Assessment/Plan:     ASSESSMENTS         1  Shortness of breath - Atypical chest pain  2  Hyperlipidemia  3  Hypertension  4  Morbid obesity  5  Depression - Anxiety  6  Tobacco abuse        PLAN         · Assigned to observation with telemetry - initial cardiac enzymes negative and will trend two more sets of troponins - due to shortness of breath, will check an echocardiogram to rule out structural deformities of the heart - also check a myocardial stress test to rule out acute coronary event and circulation deficiency - keep NPO after midnight - PRN IV morphine/little NTG on board for acute chest pain - maintain oxygenation  · Already on a statin medication at home, however, will check a fasting lipid panel in the morning to ensure optimal statin dosing - continue ASA daily   · Continue Labetalol - low-sodium diet encouraged  · Lifestyle/diet modifications - calculate BMI  · Early no HI/SI - continue Wellbutrin/Prozac - continue as needed Klonopin for anxiety - patient reassured and counseled to avoid stressful/triggering symptoms  · States she recently quit smoking approximately a month ago - almost 30 pack year history - continue to encourage abstinence and will offer transdermal nicotine patch if patient desires        DVT Prophylaxis:  Heparin SC        Code Status:  Full code         Anticipated Length of Stay:  Patient will be admitted on an Observation basis with an anticipated length of stay of less than 2 midnights  Justification for Hospital Stay:  Dyspnea on exertion with chest pain to rule out an acute coronary event         Admission Orders:  Observation/Tele  Continuous Cardiac Monitoring  Serial Cardiac Enzymes q6h x 3  Consult   Bilateral Sequential Compression Device  Echocardiogram  Nuclear Myocardial Perfusion Stress Test    Scheduled Meds:   aspirin 81 mg Oral Daily   b complex-vitamin C-folic acid 1 capsule Oral Daily   buPROPion 200 mg Oral Daily   calcium carbonate 1 tablet Oral Daily With Breakfast   cholecalciferol 1,000 Units Oral Daily   clonazePAM 0 5 mg Oral HS   FLUoxetine 40 mg Oral Daily   gemfibrozil 600 mg Oral BID   heparin (porcine) 5,000 Units Subcutaneous Q8H [address]   labetalol 100 mg Oral Q12H     IV Zofran 4 mg x 1 thus far

## 2018-01-15 NOTE — ASSESSMENT & PLAN NOTE
Lipid panel:  HDL 36    Continue gemfibrozil, continue aspirin  Consider statin therapy, will f/u as outpatient

## 2018-01-15 NOTE — PROGRESS NOTES
Progress Note - Alyssa Gomes 1965, 47 y o  female MRN: [id number]    Unit/Bed#: -01 Encounter: [id number]    Primary Care Provider: Ashley Birch DO   Date and time admitted to hospital: 1/14/2018  2:41 PM        * Chest pain   Assessment & Plan    Positive FH- CAD in brothers  Reports stress test a few years ago, pt unsure of results   Atypical CP- troponins x3 negative, EKG wnl  Echo pending, nuclear stress test tomorrow 1/16 (no technecium today), NPO after midnight  Pt also has hx of anxiety which could be contributing factor  Continue aspirin  Nitro prn          Depression with anxiety   Assessment & Plan    Continue Wellbutrin, Klonopin, and Prozac  F/u psychiatry as outpatient        Hyperlipidemia   Assessment & Plan    Lipid panel:  HDL 36    Continue gemfibrozil, continue aspirin  Consider statin therapy, will f/u as outpatient          Hypertension   Assessment & Plan    Stable, continue labetalol            VTE Pharmacologic Prophylaxis:   Pharmacologic: Heparin  Mechanical VTE Prophylaxis in Place: Yes    Patient Centered Rounds: I have performed bedside rounds with nursing staff today  Discussions with Specialists or Other Care Team Provider: none    Education and Discussions with Family / Patient: discussed with patient    Time Spent for Care: 30 minutes  More than 50% of total time spent on counseling and coordination of care as described above  Current Length of Stay: 0 day(s)    Current Patient Status: Observation   Certification Statement: The patient will continue to require additional inpatient hospital stay due to atypical chest pain, NST    Discharge Plan: d/c to home 1/16    Code Status: Level 1 - Full Code      Subjective:   Patient reports she is feeling better than yesterday   Reports she still has chest pain, has difficulty localizing but reports pain in the middle of her chest  Reports prior to admission chest pain was associated with dizziness and shortness of breath, although she does not currently have SOB or dizziness  Pt states she had a stress test a couple years ago but is unsure of the results, believes they were abnormal  She reports she has had chest pain in the past due to anxiety but this feels different  She denies cough, n/v, abdominal pain, diarrhea/constipation, fever/chills, dysuria  Endorses normal BM last night, good appetite and po intake  Objective:     Vitals:   Temp (24hrs), Av 6 °F (36 4 °C), Min:97 6 °F (36 4 °C), Max:97 7 °F (36 5 °C)    HR:  [84-93] 92  Resp:  [16-18] 16  BP: (120-163)/(63-82) 120/63  SpO2:  [94 %-97 %] 94 %  Body mass index is 38 57 kg/m²  Input and Output Summary (last 24 hours):     No intake or output data in the 24 hours ending 01/15/18 1439    Physical Exam:     Physical Exam   Constitutional: She is oriented to person, place, and time  No distress  Well developed, obese, appears stated age     HENT:   Head: Normocephalic  Eyes: EOM are normal    Neck: Neck supple  Cardiovascular: Normal rate, regular rhythm, normal heart sounds and intact distal pulses  Pulmonary/Chest: Effort normal and breath sounds normal  No respiratory distress  She has no wheezes  She exhibits no tenderness  Abdominal: Soft  Bowel sounds are normal  She exhibits no distension  There is no tenderness  There is no guarding  Neurological: She is alert and oriented to person, place, and time  Skin: Skin is warm and dry  Psychiatric: She has a normal mood and affect  Nursing note and vitals reviewed          Additional Data:     Labs:      Results from last 7 days  Lab Units 01/15/18  0543  18  1544   WBC Thousand/uL 9 59  --  10 29*   HEMOGLOBIN g/dL 13 2  --  13 8   HEMATOCRIT % 41 0  --  40 9   PLATELETS Thousands/uL 312  < > 339   NEUTROS PCT %  --   --  66   LYMPHS PCT %  --   --  22   MONOS PCT %  --   --  8   EOS PCT %  --   --  3   < > = values in this interval not displayed  Results from last 7 days  Lab Units 01/14/18  1544   SODIUM mmol/L 140   POTASSIUM mmol/L 4 1   CHLORIDE mmol/L 105   CO2 mmol/L 24   BUN mg/dL 13   CREATININE mg/dL 0 89   CALCIUM mg/dL 9 1   GLUCOSE RANDOM mg/dL 100           * I Have Reviewed All Lab Data Listed Above  * Additional Pertinent Lab Tests Reviewed: All Labs Within Last 24 Hours Reviewed    Imaging:    Imaging Reports Reviewed Today Include: CXR  Imaging Personally Reviewed by Myself Includes:  none    Recent Cultures (last 7 days):           Last 24 Hours Medication List:     aspirin 81 mg Oral Daily   b complex-vitamin C-folic acid 1 capsule Oral Daily   buPROPion 200 mg Oral Daily   calcium carbonate 1 tablet Oral Daily With Breakfast   cholecalciferol 1,000 Units Oral Daily   clonazePAM 0 5 mg Oral HS   FLUoxetine 40 mg Oral Daily   gemfibrozil 600 mg Oral BID   heparin (porcine) 5,000 Units Subcutaneous Q8H [address]   labetalol 100 mg Oral Q12H        Today, Patient Was Seen By: Ellyn Aschoff, PA-C    ** Please Note: Dictation voice to text software may have been used in the creation of this document   **

## 2018-01-16 ENCOUNTER — APPOINTMENT (OUTPATIENT)
Dept: NON INVASIVE DIAGNOSTICS | Facility: HOSPITAL | Age: 53
DRG: 287 | End: 2018-01-16
Payer: MEDICARE

## 2018-01-16 ENCOUNTER — APPOINTMENT (OUTPATIENT)
Dept: NUCLEAR MEDICINE | Facility: HOSPITAL | Age: 53
DRG: 287 | End: 2018-01-16
Payer: MEDICARE

## 2018-01-16 PROBLEM — R94.39 ABNORMAL STRESS TEST: Status: ACTIVE | Noted: 2018-01-16

## 2018-01-16 PROCEDURE — 78452 HT MUSCLE IMAGE SPECT MULT: CPT

## 2018-01-16 PROCEDURE — A9502 TC99M TETROFOSMIN: HCPCS

## 2018-01-16 PROCEDURE — 93017 CV STRESS TEST TRACING ONLY: CPT

## 2018-01-16 RX ORDER — ASPIRIN 81 MG/1
81 TABLET, CHEWABLE ORAL DAILY
Status: DISCONTINUED | OUTPATIENT
Start: 2018-01-18 | End: 2018-01-17 | Stop reason: HOSPADM

## 2018-01-16 RX ADMIN — HEPARIN SODIUM 5000 UNITS: 5000 INJECTION, SOLUTION INTRAVENOUS; SUBCUTANEOUS at 06:07

## 2018-01-16 RX ADMIN — GEMFIBROZIL 600 MG: 600 TABLET ORAL at 17:51

## 2018-01-16 RX ADMIN — HEPARIN SODIUM 5000 UNITS: 5000 INJECTION, SOLUTION INTRAVENOUS; SUBCUTANEOUS at 15:32

## 2018-01-16 RX ADMIN — ASPIRIN 81 MG 81 MG: 81 TABLET ORAL at 11:15

## 2018-01-16 RX ADMIN — LABETALOL HYDROCHLORIDE 100 MG: 100 TABLET, FILM COATED ORAL at 11:14

## 2018-01-16 RX ADMIN — GEMFIBROZIL 600 MG: 600 TABLET ORAL at 11:16

## 2018-01-16 RX ADMIN — LABETALOL HYDROCHLORIDE 100 MG: 100 TABLET, FILM COATED ORAL at 20:30

## 2018-01-16 RX ADMIN — HEPARIN SODIUM 5000 UNITS: 5000 INJECTION, SOLUTION INTRAVENOUS; SUBCUTANEOUS at 20:30

## 2018-01-16 RX ADMIN — Medication 1 TABLET: at 11:14

## 2018-01-16 RX ADMIN — CLONAZEPAM 0.5 MG: 0.5 TABLET ORAL at 20:30

## 2018-01-16 RX ADMIN — BUPROPION HYDROCHLORIDE 200 MG: 100 TABLET, FILM COATED, EXTENDED RELEASE ORAL at 11:16

## 2018-01-16 RX ADMIN — REGADENOSON 0.4 MG: 0.08 INJECTION, SOLUTION INTRAVENOUS at 09:18

## 2018-01-16 RX ADMIN — NEPHROCAP 1 CAPSULE: 1 CAP ORAL at 11:14

## 2018-01-16 RX ADMIN — FLUOXETINE 40 MG: 20 CAPSULE ORAL at 11:15

## 2018-01-16 RX ADMIN — CHOLECALCIFEROL TAB 25 MCG (1000 UNIT) 1000 UNITS: 25 TAB at 11:14

## 2018-01-16 NOTE — ASSESSMENT & PLAN NOTE
Lipid panel:  HDL 36    Continue gemfibrozil, continue aspirin  Pt on statin at home, will f/u as outpatient

## 2018-01-16 NOTE — CONSULTS
Consultation - Cardiology Team One  Pilar Bernheim 47 y o  female MRN: [id number]  Unit/Bed#: -01 Encounter: [id number]    Inpatient consult to Cardiology  Consult performed by: Nasreen Brumfield  Consult ordered by: Carolyn Rebolledo        Physician Requesting Consult: Margarita Flores MD     Reason for Consult / Principal Problem: abnormal stress test    History of Present Illness      HPI: Pilar Bernheim is a 47y o  year old female who has a history of HLD, HTN,  anxiety/depression  She does not follow with a cardiologist           Pt presented to [address] [place] [address] on 1/14 with complaints of chest pain  She describes the pain as pressure like with radiation to jaw and lower back  She did not have any associated nausea/diaphoresis, dizziness, lightheadedness  She did note some mild SOB  Pain occurred at rest  She has been experiencing exertional SOB and decreased activity tolerance for the past few months, this was the first occurrence of chest pain/pressure  On presentation to ED, her EKG showed SR without ischemic changes, serial troponins are negative  She underwent lexiscan nuclear stress test today that showed a small, mildly severe, reversible myocardial perfusion defect of the mid anterior wall  An echo revealed normal systolic function with LVEF 65% without RWMA, grade 1dd, normal RV size and function  Cardiology consultation requested regarding abnormal ST  Pt has been chest pain free since admission except for some mild chest pressure and SOB during ST when she received lexiscan  Pt has hx of HLD: prior intolerance to lipitor in past, developed mental fatigue and confusion, no myalgias  Now on gemfibrozil  Has not tried any other statin  She is on labetalol 100mg BID for HTN  Has a strong family hx of CAD; reports father had first MI at age 50  Has 5 sibling who all have either CAD, HTN or HLD  All siblings are still living   Other paternal uncles with early CAD as well    Also notes a strong family hx of DM; no personal hx  Current A1c 5 9    She lives a very sedentary lifestyle, no regular exercise  She admits lately she tried to do light walking but felt that she was getting SOB with just walking  Admits this has been going on for months, she tried to ignore her symptoms  She recently quit smoking, smoked on and off for about 25 years, quit 2 weeks ago  Review of Systems   Constitution: Positive for malaise/fatigue  Negative for decreased appetite, fever and weakness  Cardiovascular: Positive for dyspnea on exertion  Negative for chest pain, irregular heartbeat, leg swelling, near-syncope, orthopnea, palpitations and syncope  Respiratory: Negative for cough, shortness of breath and wheezing  Musculoskeletal: Negative for falls  Gastrointestinal: Negative for abdominal pain, nausea and vomiting  Genitourinary: Negative for dysuria  Neurological: Negative for dizziness and light-headedness  Psychiatric/Behavioral: Negative for altered mental status  The patient is nervous/anxious  All other systems reviewed and are negative  Historical Information   Past Medical History:   Diagnosis Date    Anxiety     Hyperlipidemia     Hypertension     Psychiatric disorder      Past Surgical History:   Procedure Laterality Date    ENDOMETRIAL ABLATION      TONSILLECTOMY       History   Alcohol Use No     History   Drug Use No     History   Smoking Status    Former Smoker    Quit date: 12/23/2017   Smokeless Tobacco    Never Used     Family History: History reviewed  No pertinent family history      Meds/Allergies   current meds:   Current Facility-Administered Medications   Medication Dose Route Frequency    acetaminophen (TYLENOL) tablet 650 mg  650 mg Oral Q6H PRN    [START ON 1/18/2018] aspirin chewable tablet 81 mg  81 mg Oral Daily    b complex-vitamin C-folic acid (NEPHROCAPS) capsule 1 capsule  1 capsule Oral Daily    buPROPion [facility] SR) 12 hr tablet 200 mg  200 mg Oral Daily    calcium carbonate (OYSTER SHELL,OSCAL) 500 mg tablet 1 tablet  1 tablet Oral Daily With Breakfast    cholecalciferol (VITAMIN D3) tablet 1,000 Units  1,000 Units Oral Daily    clonazePAM (KlonoPIN) tablet 0 5 mg  0 5 mg Oral HS    FLUoxetine (PROzac) capsule 40 mg  40 mg Oral Daily    gemfibrozil (LOPID) tablet 600 mg  600 mg Oral BID    heparin (porcine) subcutaneous injection 5,000 Units  5,000 Units Subcutaneous Q8H [address]    labetalol (NORMODYNE) tablet 100 mg  100 mg Oral Q12H    morphine injection 2 mg  2 mg Intravenous Q4H PRN    nitroglycerin (NITROSTAT) SL tablet 0 4 mg  0 4 mg Sublingual Q5 Min PRN    ondansetron (ZOFRAN) injection 4 mg  4 mg Intravenous Q4H PRN     Allergies   Allergen Reactions    Penicillins Swelling     Objective   Vitals: Blood pressure 123/72, pulse 85, temperature 97 7 °F (36 5 °C), temperature source Oral, resp  rate 18, height 5' 7" (1 702 m), weight 112 kg (246 lb 4 1 oz), SpO2 94 %  ,     Body mass index is 38 57 kg/m²  ,     Systolic (34JOF), HFB:930 , Min:122 , EYS:443     Diastolic (25GKA), RTF:18, Min:72, Max:86      Intake/Output Summary (Last 24 hours) at 01/16/18 1419  Last data filed at 01/15/18 2213   Gross per 24 hour   Intake              360 ml   Output                0 ml   Net              360 ml     Weight (last 2 days)     Date/Time   Weight    01/14/18 2021  112 (246 25)    01/14/18 1452  111 (244 71)            Invasive Devices     Peripheral Intravenous Line            Peripheral IV 01/14/18 Right Antecubital 1 day              Physical Exam   Constitutional: She is oriented to person, place, and time  No distress  Pt sitting up in bed in NAD,    HENT:   Head: Normocephalic and atraumatic  Neck: No JVD present  Cardiovascular: Normal rate, regular rhythm, S1 normal and S2 normal     No murmur heard  No LE edema   Pulmonary/Chest: Effort normal and breath sounds normal  No respiratory distress   She has no wheezes  She has no rales  Abdominal: Soft  obese   Musculoskeletal: She exhibits no edema  Neurological: She is alert and oriented to person, place, and time  Skin: Skin is warm and dry  She is not diaphoretic  Psychiatric: She has a normal mood and affect  Her behavior is normal    Nursing note and vitals reviewed      LABORATORY RESULTS:    Results from last 7 days  Lab Units 01/14/18  2220 01/14/18  1908 01/14/18  1544   TROPONIN I ng/mL <0 02 <0 02 <0 02     CBC with diff:   Results from last 7 days  Lab Units 01/15/18  0543 01/14/18  2220 01/14/18  1544   WBC Thousand/uL 9 59  --  10 29*   HEMOGLOBIN g/dL 13 2  --  13 8   HEMATOCRIT % 41 0  --  40 9   MCV fL 87  --  86   PLATELETS Thousands/uL 312 302 339   MCH pg 27 9  --  29 1   MCHC g/dL 32 2  --  33 7   RDW % 12 7  --  12 6   MPV fL 11 0 10 9 11 6     CMP:  Results from last 7 days  Lab Units 01/14/18  1544   SODIUM mmol/L 140   POTASSIUM mmol/L 4 1   CHLORIDE mmol/L 105   CO2 mmol/L 24   ANION GAP mmol/L 11   BUN mg/dL 13   CREATININE mg/dL 0 89   GLUCOSE RANDOM mg/dL 100   CALCIUM mg/dL 9 1   EGFR ml/min/1 73sq m 75     BMP:  Results from last 7 days  Lab Units 01/14/18  1544   SODIUM mmol/L 140   POTASSIUM mmol/L 4 1   CHLORIDE mmol/L 105   CO2 mmol/L 24   BUN mg/dL 13   CREATININE mg/dL 0 89   GLUCOSE RANDOM mg/dL 100   CALCIUM mg/dL 9 1        Results from last 7 days  Lab Units 01/15/18  0543   MAGNESIUM mg/dL 2 1        Results from last 7 days  Lab Units 01/15/18  0543   HEMOGLOBIN A1C % 5 9        Results from last 7 days  Lab Units 01/14/18  2220   TSH 3RD GENERATON uIU/mL 3 017     Lipid Profile:   Lab Results   Component Value Date    CHOL 200 01/15/2018    CHOL 218 (H) 07/13/2017    CHOL 238 (H) 02/21/2017     Lab Results   Component Value Date    HDL 36 (L) 01/15/2018    HDL 37 (L) 07/13/2017    HDL 40 02/21/2017     Lab Results   Component Value Date    LDLCALC 100 01/15/2018    LDLCALC 115 (H) 07/13/2017    LDLCALC 134 (H) 2017     Lab Results   Component Value Date    TRIG 319 (H) 01/15/2018    TRIG 331 (H) 2017    TRIG 320 (H) 2017     Cardiac testing:   Results for orders placed during the hospital encounter of 18   Echo complete with contrast if indicated    Narrative Ira [address], [address] [facility]  [phone]    Transthoracic Echocardiogram  2D, M-mode, Doppler, and Color Doppler    Study date:  15-Yoan-2018    Patient: Daron Denver  MR number: [id number]  Account number: [de-identified]  : 1965  Age: 47 years  Gender: Female  Status: Outpatient  Location: Bedside  Height: 67 in  Weight: 245 5 lb  BP: 120/ 63 mmHg    Indications: Chest Pain    Diagnoses: R07 9 - Chest pain, unspecified    Sonographer:  Larry Navas RDCS  Primary Physician:  Charity Ashton DO  Referring Physician:  Apryl Thomas MD  Group:  [facility]  Interpreting Physician:  Alexandr Beltran DO    SUMMARY    LEFT VENTRICLE:  Systolic function was normal  Ejection fraction was estimated to be 65 %  There were no regional wall motion abnormalities  Wall thickness was mildly increased  The changes were consistent with concentric remodeling (increased wall thickness with normal wall mass)  Doppler parameters were consistent with abnormal left ventricular relaxation (grade 1 diastolic dysfunction)  RIGHT VENTRICLE:  The size was normal   Systolic function was normal     HISTORY: PRIOR HISTORY: Hypertension, Hyperlipidemia, GERD, Chest Pain, Lyme Disease    PROCEDURE: The procedure was performed at the bedside  This was a routine study  The transthoracic approach was used  The study included complete 2D imaging, M-mode, complete spectral Doppler, and color Doppler  The heart rate was 81 bpm,  at the start of the study  Images were obtained from the parasternal, apical, subcostal, and suprasternal notch acoustic windows  Image quality was adequate      LEFT VENTRICLE: Size was normal  Systolic function was normal  Ejection fraction was estimated to be 65 %  There were no regional wall motion abnormalities  Wall thickness was mildly increased  The changes were consistent with concentric  remodeling (increased wall thickness with normal wall mass)  DOPPLER: Doppler parameters were consistent with abnormal left ventricular relaxation (grade 1 diastolic dysfunction)  There was no evidence of elevated ventricular filling  pressure by Doppler parameters  RIGHT VENTRICLE: The size was normal  Systolic function was normal  Wall thickness was normal     LEFT ATRIUM: Size was normal     RIGHT ATRIUM: Size was normal     MITRAL VALVE: Valve structure was normal  There was normal leaflet separation  DOPPLER: The transmitral velocity was within the normal range  There was no evidence for stenosis  There was no regurgitation  AORTIC VALVE: The valve was probably trileaflet  Leaflets exhibited normal thickness and no calcification  The valve was not well visualized  TRICUSPID VALVE: The valve structure was normal  There was normal leaflet separation  DOPPLER: The transtricuspid velocity was within the normal range  There was no evidence for stenosis  There was trace regurgitation  The tricuspid jet  envelope definition was inadequate for estimation of RV systolic pressure  There are no indirect findings (abnormal RV volume or geometry, altered pulmonary flow velocity profile, or leftward septal displacement) which would suggest  moderate or severe pulmonary hypertension  PULMONIC VALVE: Leaflets exhibited normal thickness, no calcification, and normal cuspal separation  DOPPLER: The transpulmonic velocity was within the normal range  There was no significant regurgitation  PERICARDIUM: There was no pericardial effusion  The pericardium was normal in appearance  AORTA: The root exhibited normal size      SYSTEMIC VEINS: IVC: The inferior vena cava was not well visualized  SYSTEM MEASUREMENT TABLES    2D  %FS: 34 57 %  AV Diam: 2 63 cm  EDV(Teich): 68 09 ml  EF(Teich): 64 31 %  ESV(Teich): 24 31 ml  IVSd: 1 1 cm  LA Area: 13 94 cm2  LA Diam: 3 6 cm  LVEDV MOD A4C: 88 31 ml  LVEF MOD A4C: 70 15 %  LVESV MOD A4C: 26 36 ml  LVIDd: 3 95 cm  LVIDs: 2 59 cm  LVLd A4C: 9 17 cm  LVLs A4C: 7 45 cm  LVPWd: 0 96 cm  RA Area: 9 93 cm2  RVIDd: 2 83 cm  SV MOD A4C: 61 95 ml  SV(Teich): 43 79 ml    CW  AV MaxP 12 mmHg  AV Vmax: 1 13 m/s    MM  TAPSE: 2 45 cm    PW  E': 0 11 m/s  E/E': 6 03  MV A Jose: 0 89 m/s  MV Dec [place]: 2 51 m/s2  MV DecT: 274 73 ms  MV E Jose: 0 69 m/s  MV E/A Ratio: 0 78  MV PHT: 79 67 ms  MVA By PHT: 2 76 cm2    [employer] Accredited Echocardiography Laboratory    Prepared and electronically signed by    Geoff Childress DO  Signed 15-Yoan-2018 16:10:29       Imaging: I have personally reviewed pertinent reports  Xr Chest 1 View Portable    Result Date: 1/15/2018  Narrative: CHEST INDICATION:  cp  History taken directly from the electronic ordering system  COMPARISON:  2011 VIEWS:   AP frontal IMAGES:  1 FINDINGS:     Cardiomediastinal silhouette appears unremarkable  The lungs are clear  No pneumothorax or pleural effusion  Visualized osseous structures appear within normal limits for the patient's age  Impression: No active pulmonary disease   Workstation performed: [id number]     EKG reviewed personally:  18  Normal sinus rhythm, no ST/twave abnormalities  Rate 94    Telemetry reviewed personally:  Sinus rhythm, no significant events    Assessment  Principal Problem:    Chest pain  Active Problems:    Hypertension    Hyperlipidemia    Depression with anxiety      Assessment/ Plan:    Abnormal Stress Test:   Pt presented with symptoms concerning for angina, EKG non-ischemic, troponin ocsslew3d x3  Has significant risk factors for CAD; underwent pharmacological nuclear ST today that showed a small perfusion defect of mid- anterior wall  She has had progressive exertional dyspnea over past few months  Echo with normal LVEF without RWMA  Recommend proceeding with cardiac catheterization tomorrow  Procedure including risks discussed with the patient and her , She is very anxious but is agreeable to proceed  Formal consent to be obtained by performing physician  NPO after MN tonight  Give ASA 325mg in AM  Renal function wnl on 1/14; repeat BMP in AM as well as CBC and INR  Already on beta blocker, will address need for statin therapy post cath; she has had prior intolerance to lipitor and is hesistant to start statin, if she has CAD on cath then this will be necessary  Pt is obese and family hx of DM, she is interested in making dietary changes, will consult nutritionist for consultation, low cholesterol, low Na diet  HTN: BP control adequate on home dose labetalol 100mg BID; current /60    HLD: current lipid panel, total 200, Triglycerides 319, HDL 36,   Thank you for allowing us to participate in this patient's care  This pt will follow up with Dr Naseem Cornejo once discharged  Counseling / Coordination of Care  Total floor / unit time spent today 45 minutes  Greater than 50% of total time was spent with the patient and / or family counseling and / or coordination of care  A description of the counseling / coordination of care: Review of history, current assessment, development of a plan  Code Status: Level 1 - Full Code    ** Please Note: Dragon 360 Dictation voice to text software may have been used in the creation of this document   **

## 2018-01-16 NOTE — RESULT NOTES
Discussion/Summary   OVERALL BETTER  STILL NEED SOME WORK    DR Erin Solis     Verified Results  ([address] [facility]) CMP 12+1AC [id number] 09:39AM Lio Cooley     Test Name Result Flag Reference   Glucose, Serum 93 mg/dL  65-99   BUN 20 mg/dL  6-24   Creatinine, Serum 0 85 mg/dL  0 57-1 00   eGFR If NonAfricn Am 80 mL/min/1 73  >59   eGFR If Africn Am 92 mL/min/1 73  >59   BUN/Creatinine Ratio 24 H 9-23   Sodium, Serum 139 mmol/L  134-144   Potassium, Serum 4 4 mmol/L  3 5-5 2   Chloride, Serum 103 mmol/L     Calcium, Serum 9 1 mg/dL  8 7-10 2   Protein, Total, Serum 6 8 g/dL  6 0-8 5   Albumin, Serum 4 3 g/dL  3 5-5 5   Globulin, Total 2 5 g/dL  1 5-4 5   A/G Ratio 1 7  1 2-2 2   Bilirubin, Total <0 2 mg/dL  0 0-1 2   Alkaline Phosphatase, S 55 IU/L     AST (SGOT) 22 IU/L  0-40   ALT (SGPT) 34 IU/L H 0-32     (LC) Lipid Panel [id number] 09:39AM Lio Cooley     Test Name Result Flag Reference   Cholesterol, Total 218 mg/dL H 100-199   Triglycerides 331 mg/dL H 0-149   HDL Cholesterol 37 mg/dL L >39   VLDL Cholesterol Henry 66 mg/dL H 5-40   LDL Cholesterol Calc 115 mg/dL H 0-99     (1) HEMOGLOBIN A1C [id number] 09:39AM Vaca Furadam     Test Name Result Flag Reference   Hemoglobin A1c 5 7 % H 4 8-5 6   Pre-diabetes: 5 7 - 6 4           Diabetes: >6 4           Glycemic control for adults with diabetes: <7 0     (LC) Evertt Keto LP Default [id number] 09:39AM Vaca Furadam     Test Name Result Flag Reference   Ambig Abbrev LP Default Comment     A hand-written panel/profile was received from your office  In  accordance with the [facility] Ambiguous Test Code Policy dated July 3529, we have completed your order by using the closest currently  or formerly recognized AMA panel  We have assigned Lipid Panel,  Test Code #322639 to this request  If this is not the testing you  wished to receive on this specimen, please contact the [address] [place] [address]  Client Inquiry/Technical Services Department to clarify the test  order    We appreciate your business

## 2018-01-16 NOTE — ASSESSMENT & PLAN NOTE
IMPRESSIONS: Abnormal study after vasodilation and low level exercise  There was a small amount of ischemia in the anterior region  Left ventricular systolic function was normal  Diagnostic sensitivity was limited by submaximal stress    Cardiology following, pt to get cardiac catheterization tomorrow 1/17

## 2018-01-16 NOTE — ASSESSMENT & PLAN NOTE
Positive FH- CAD in brothers  Reports stress test a few years ago, pt unsure of results   Atypical CP- troponins x3 negative, EKG wnl  Echo EF 46%, grade 1 diastolic dysfunction, nuclear stress results pending   Pt also has hx of anxiety and depression which could be contributing factor  Continue aspirin, nitro prn  Smoking cessation encouraged

## 2018-01-16 NOTE — PROGRESS NOTES
Progress Note - Sanjay Verma 1965, 47 y o  female MRN: [id number]    Unit/Bed#: -01 Encounter: [id number]    Primary Care Provider: Anna Marie Ayala DO   Date and time admitted to hospital: 1/14/2018  2:41 PM        Abnormal stress test   Assessment & Plan    IMPRESSIONS: Abnormal study after vasodilation and low level exercise  There was a small amount of ischemia in the anterior region  Left ventricular systolic function was normal  Diagnostic sensitivity was limited by submaximal stress  Cardiology following, pt to get cardiac catheterization tomorrow 1/17        * Chest pain   Assessment & Plan    Positive FH- CAD in brothers  Reports stress test a few years ago, pt unsure of results   Atypical CP- troponins x3 negative, EKG wnl  Echo EF 87%, grade 1 diastolic dysfunction, nuclear stress results pending   Pt also has hx of anxiety and depression which could be contributing factor  Continue aspirin, nitro prn  Smoking cessation encouraged          Depression with anxiety   Assessment & Plan    Continue Wellbutrin, Klonopin, and Prozac  F/u psychiatry as outpatient        Hyperlipidemia   Assessment & Plan    Lipid panel:  HDL 36    Continue gemfibrozil, continue aspirin  Pt on statin at home, will f/u as outpatient          Hypertension   Assessment & Plan    Stable, continue labetalol            VTE Pharmacologic Prophylaxis:   Pharmacologic: Heparin  Mechanical VTE Prophylaxis in Place: Yes    Patient Centered Rounds: I have performed bedside rounds with nursing staff today  Discussions with Specialists or Other Care Team Provider: discussed with cardiology    Education and Discussions with Family / Patient: discussed with patient,  at bedside    Time Spent for Care: 30 minutes  More than 50% of total time spent on counseling and coordination of care as described above      Current Length of Stay: 0 day(s)    Current Patient Status: Inpatient   Certification Statement: The patient will continue to require additional inpatient hospital stay due to abnormal NST, cardiac catheterization    Discharge Plan: discharge to home , cardiac cath tomorrow      Code Status: Level 1 - Full Code      Subjective:   Pt seen and examined at bedside  Patient reports she still has chest pain, center of chest, better than yesterday but feels like a pinching pain  Denies any exacerbating or alleviating factors  Also states she is very anxious to get the results of the stress test  Spoke with cardiology, pt had abnormal NST and will need cardiac catheterization tomorrow  Patient endorses good appetite, denies any new complaints  Objective:     Vitals:   Temp (24hrs), Av 9 °F (36 6 °C), Min:97 7 °F (36 5 °C), Max:98 2 °F (36 8 °C)    HR:  [77-88] 85  Resp:  [17-18] 18  BP: (122-133)/(72-86) 123/72  SpO2:  [94 %-98 %] 94 %  Body mass index is 38 57 kg/m²  Input and Output Summary (last 24 hours): Intake/Output Summary (Last 24 hours) at 18 1443  Last data filed at 01/15/18 2213   Gross per 24 hour   Intake              360 ml   Output                0 ml   Net              360 ml       Physical Exam:     Physical Exam   Constitutional: She is oriented to person, place, and time  She appears well-developed  Obese, in NAD   HENT:   Head: Normocephalic and atraumatic  Eyes: EOM are normal    Neck: Neck supple  Cardiovascular: Normal rate, regular rhythm, normal heart sounds and intact distal pulses  No murmur heard  Pulmonary/Chest: Effort normal and breath sounds normal  No respiratory distress  She has no wheezes  She has no rales  Abdominal: Soft  Bowel sounds are normal  She exhibits no distension  There is no tenderness  There is no guarding  Musculoskeletal: Normal range of motion  Neurological: She is alert and oriented to person, place, and time  Skin: Skin is warm and dry     Psychiatric:   Anxious         Additional Data: Labs:      Results from last 7 days  Lab Units 01/15/18  0543  01/14/18  1544   WBC Thousand/uL 9 59  --  10 29*   HEMOGLOBIN g/dL 13 2  --  13 8   HEMATOCRIT % 41 0  --  40 9   PLATELETS Thousands/uL 312  < > 339   NEUTROS PCT %  --   --  66   LYMPHS PCT %  --   --  22   MONOS PCT %  --   --  8   EOS PCT %  --   --  3   < > = values in this interval not displayed  Results from last 7 days  Lab Units 01/14/18  1544   SODIUM mmol/L 140   POTASSIUM mmol/L 4 1   CHLORIDE mmol/L 105   CO2 mmol/L 24   BUN mg/dL 13   CREATININE mg/dL 0 89   CALCIUM mg/dL 9 1   GLUCOSE RANDOM mg/dL 100           * I Have Reviewed All Lab Data Listed Above  * Additional Pertinent Lab Tests Reviewed: All Labs Within Last 24 Hours Reviewed    Imaging:    Imaging Reports Reviewed Today Include: NST  Imaging Personally Reviewed by Myself Includes:  none    Recent Cultures (last 7 days):           Last 24 Hours Medication List:     aspirin 81 mg Oral Daily   b complex-vitamin C-folic acid 1 capsule Oral Daily   buPROPion 200 mg Oral Daily   calcium carbonate 1 tablet Oral Daily With Breakfast   cholecalciferol 1,000 Units Oral Daily   clonazePAM 0 5 mg Oral HS   FLUoxetine 40 mg Oral Daily   gemfibrozil 600 mg Oral BID   heparin (porcine) 5,000 Units Subcutaneous Q8H [address]   labetalol 100 mg Oral Q12H        Today, Patient Was Seen By: Rachael Sabillon PA-C    ** Please Note: Dictation voice to text software may have been used in the creation of this document   **

## 2018-01-16 NOTE — CASE MANAGEMENT
Inpatient Status 01/16/2018 @ 14:12      Continued Stay Review    Date: 01/16/2018    Vital Signs: /60   Pulse 86   Temp (!) 97 3 °F (36 3 °C) (Oral)   Resp 16   Ht 5' 7" (1 702 m)   Wt 112 kg (246 lb 4 1 oz)   SpO2 96%   BMI 38 57 kg/m²     Medications:   Scheduled Meds:   [START ON 1/18/2018] aspirin 81 mg Oral Daily   b complex-vitamin C-folic acid 1 capsule Oral Daily   buPROPion 200 mg Oral Daily   calcium carbonate 1 tablet Oral Daily With Breakfast   cholecalciferol 1,000 Units Oral Daily   clonazePAM 0 5 mg Oral HS   FLUoxetine 40 mg Oral Daily   gemfibrozil 600 mg Oral BID   heparin (porcine) 5,000 Units Subcutaneous Q8H [address]   labetalol 100 mg Oral Q12H     Abnormal Labs/Diagnostic Results:     Stress Test: Abnormal study after vasodilation and low level exercise  There was a small amount of ischemia in the anterior region  Left ventricular systolic function was normal  Diagnostic sensitivity was limited by submaximal stress  Cardiology following, pt to get cardiac catheterization tomorrow 1/17     Age/Sex: 47 y o  female     Assessment/Plan:     Abnormal stress test   Assessment & Plan     IMPRESSIONS: Abnormal study after vasodilation and low level exercise  There was a small amount of ischemia in the anterior region  Left ventricular systolic function was normal  Diagnostic sensitivity was limited by submaximal stress    Cardiology following, pt to get cardiac catheterization tomorrow 1/17       * Chest pain   Assessment & Plan     Positive FH- CAD in brothers  Reports stress test a few years ago, pt unsure of results   Atypical CP- troponins x3 negative, EKG wnl  Echo EF 25%, grade 1 diastolic dysfunction, nuclear stress results pending   Pt also has hx of anxiety and depression which could be contributing factor  Continue aspirin, nitro prn  Smoking cessation encouraged          Depression with anxiety   Assessment & Plan     Continue Wellbutrin, Klonopin, and Prozac  F/u psychiatry as outpatient       Hyperlipidemia   Assessment & Plan     Lipid panel:  HDL 36    Continue gemfibrozil, continue aspirin  Pt on statin at home, will f/u as outpatient          Hypertension   Assessment & Plan     Stable, continue labetalol             VTE Pharmacologic Prophylaxis:   Pharmacologic: Heparin  Mechanical VTE Prophylaxis in Place: Yes     Patient Centered Rounds: I have performed bedside rounds with nursing staff today      Discussions with Specialists or Other Care Team Provider: discussed with cardiology     Education and Discussions with Family / Patient: discussed with patient,  at bedside     Time Spent for Care: 30 minutes    More than 50% of total time spent on counseling and coordination of care as described above      Current Length of Stay: 0 day(s)     Current Patient Status: Inpatient   Certification Statement: The patient will continue to require additional inpatient hospital stay due to abnormal NST, cardiac catheterization     Discharge Plan: discharge to home 1/18, cardiac cath tomorrow 1/17

## 2018-01-17 ENCOUNTER — APPOINTMENT (INPATIENT)
Dept: NON INVASIVE DIAGNOSTICS | Facility: HOSPITAL | Age: 53
DRG: 287 | End: 2018-01-17
Payer: MEDICARE

## 2018-01-17 VITALS
SYSTOLIC BLOOD PRESSURE: 134 MMHG | HEIGHT: 67 IN | WEIGHT: 246.25 LBS | RESPIRATION RATE: 18 BRPM | OXYGEN SATURATION: 96 % | HEART RATE: 89 BPM | TEMPERATURE: 98 F | BODY MASS INDEX: 38.65 KG/M2 | DIASTOLIC BLOOD PRESSURE: 80 MMHG

## 2018-01-17 LAB
ANION GAP SERPL CALCULATED.3IONS-SCNC: 10 MMOL/L (ref 4–13)
BASOPHILS # BLD AUTO: 0.06 THOUSANDS/ΜL (ref 0–0.1)
BASOPHILS NFR BLD AUTO: 1 % (ref 0–1)
BUN SERPL-MCNC: 12 MG/DL (ref 5–25)
CALCIUM SERPL-MCNC: 8.9 MG/DL (ref 8.3–10.1)
CHLORIDE SERPL-SCNC: 105 MMOL/L (ref 100–108)
CO2 SERPL-SCNC: 24 MMOL/L (ref 21–32)
CREAT SERPL-MCNC: 0.81 MG/DL (ref 0.6–1.3)
EOSINOPHIL # BLD AUTO: 0.26 THOUSAND/ΜL (ref 0–0.61)
EOSINOPHIL NFR BLD AUTO: 3 % (ref 0–6)
ERYTHROCYTE [DISTWIDTH] IN BLOOD BY AUTOMATED COUNT: 12.7 % (ref 11.6–15.1)
GFR SERPL CREATININE-BSD FRML MDRD: 84 ML/MIN/1.73SQ M
GLUCOSE SERPL-MCNC: 104 MG/DL (ref 65–140)
HCT VFR BLD AUTO: 37.9 % (ref 34.8–46.1)
HGB BLD-MCNC: 12.9 G/DL (ref 11.5–15.4)
INR PPP: 0.91 (ref 0.86–1.16)
LYMPHOCYTES # BLD AUTO: 2.45 THOUSANDS/ΜL (ref 0.6–4.47)
LYMPHOCYTES NFR BLD AUTO: 30 % (ref 14–44)
MCH RBC QN AUTO: 28.7 PG (ref 26.8–34.3)
MCHC RBC AUTO-ENTMCNC: 34 G/DL (ref 31.4–37.4)
MCV RBC AUTO: 84 FL (ref 82–98)
MONOCYTES # BLD AUTO: 0.62 THOUSAND/ΜL (ref 0.17–1.22)
MONOCYTES NFR BLD AUTO: 8 % (ref 4–12)
NEUTROPHILS # BLD AUTO: 4.76 THOUSANDS/ΜL (ref 1.85–7.62)
NEUTS SEG NFR BLD AUTO: 58 % (ref 43–75)
PLATELET # BLD AUTO: 308 THOUSANDS/UL (ref 149–390)
PMV BLD AUTO: 11.2 FL (ref 8.9–12.7)
POTASSIUM SERPL-SCNC: 3.6 MMOL/L (ref 3.5–5.3)
PROTHROMBIN TIME: 12.5 SECONDS (ref 12.1–14.4)
RBC # BLD AUTO: 4.5 MILLION/UL (ref 3.81–5.12)
SODIUM SERPL-SCNC: 139 MMOL/L (ref 136–145)
WBC # BLD AUTO: 8.15 THOUSAND/UL (ref 4.31–10.16)

## 2018-01-17 PROCEDURE — C1769 GUIDE WIRE: HCPCS | Performed by: NURSE PRACTITIONER

## 2018-01-17 PROCEDURE — 93458 L HRT ARTERY/VENTRICLE ANGIO: CPT | Performed by: NURSE PRACTITIONER

## 2018-01-17 PROCEDURE — B2151ZZ FLUOROSCOPY OF LEFT HEART USING LOW OSMOLAR CONTRAST: ICD-10-PCS | Performed by: INTERNAL MEDICINE

## 2018-01-17 PROCEDURE — C1894 INTRO/SHEATH, NON-LASER: HCPCS | Performed by: NURSE PRACTITIONER

## 2018-01-17 PROCEDURE — 80048 BASIC METABOLIC PNL TOTAL CA: CPT | Performed by: NURSE PRACTITIONER

## 2018-01-17 PROCEDURE — B2111ZZ FLUOROSCOPY OF MULTIPLE CORONARY ARTERIES USING LOW OSMOLAR CONTRAST: ICD-10-PCS | Performed by: INTERNAL MEDICINE

## 2018-01-17 PROCEDURE — 85025 COMPLETE CBC W/AUTO DIFF WBC: CPT | Performed by: NURSE PRACTITIONER

## 2018-01-17 PROCEDURE — 99152 MOD SED SAME PHYS/QHP 5/>YRS: CPT | Performed by: NURSE PRACTITIONER

## 2018-01-17 PROCEDURE — 4A023N7 MEASUREMENT OF CARDIAC SAMPLING AND PRESSURE, LEFT HEART, PERCUTANEOUS APPROACH: ICD-10-PCS | Performed by: INTERNAL MEDICINE

## 2018-01-17 PROCEDURE — 85610 PROTHROMBIN TIME: CPT | Performed by: NURSE PRACTITIONER

## 2018-01-17 RX ORDER — SODIUM CHLORIDE 9 MG/ML
250 INJECTION, SOLUTION INTRAVENOUS CONTINUOUS
Status: DISPENSED | OUTPATIENT
Start: 2018-01-17 | End: 2018-01-17

## 2018-01-17 RX ORDER — LIDOCAINE HYDROCHLORIDE 10 MG/ML
INJECTION, SOLUTION INFILTRATION; PERINEURAL CODE/TRAUMA/SEDATION MEDICATION
Status: COMPLETED | OUTPATIENT
Start: 2018-01-17 | End: 2018-01-17

## 2018-01-17 RX ORDER — MIDAZOLAM HYDROCHLORIDE 1 MG/ML
INJECTION INTRAMUSCULAR; INTRAVENOUS CODE/TRAUMA/SEDATION MEDICATION
Status: COMPLETED | OUTPATIENT
Start: 2018-01-17 | End: 2018-01-17

## 2018-01-17 RX ORDER — FENTANYL CITRATE 50 UG/ML
INJECTION, SOLUTION INTRAMUSCULAR; INTRAVENOUS CODE/TRAUMA/SEDATION MEDICATION
Status: COMPLETED | OUTPATIENT
Start: 2018-01-17 | End: 2018-01-17

## 2018-01-17 RX ORDER — HEPARIN SODIUM 1000 [USP'U]/ML
INJECTION, SOLUTION INTRAVENOUS; SUBCUTANEOUS CODE/TRAUMA/SEDATION MEDICATION
Status: COMPLETED | OUTPATIENT
Start: 2018-01-17 | End: 2018-01-17

## 2018-01-17 RX ORDER — VERAPAMIL HYDROCHLORIDE 2.5 MG/ML
INJECTION, SOLUTION INTRAVENOUS CODE/TRAUMA/SEDATION MEDICATION
Status: COMPLETED | OUTPATIENT
Start: 2018-01-17 | End: 2018-01-17

## 2018-01-17 RX ORDER — ASPIRIN 81 MG/1
324 TABLET, CHEWABLE ORAL ONCE
Status: COMPLETED | OUTPATIENT
Start: 2018-01-17 | End: 2018-01-17

## 2018-01-17 RX ORDER — SODIUM CHLORIDE 9 MG/ML
75 INJECTION, SOLUTION INTRAVENOUS CONTINUOUS
Status: DISCONTINUED | OUTPATIENT
Start: 2018-01-17 | End: 2018-01-17 | Stop reason: HOSPADM

## 2018-01-17 RX ORDER — NITROGLYCERIN 20 MG/100ML
INJECTION INTRAVENOUS CODE/TRAUMA/SEDATION MEDICATION
Status: COMPLETED | OUTPATIENT
Start: 2018-01-17 | End: 2018-01-17

## 2018-01-17 RX ADMIN — MIDAZOLAM HYDROCHLORIDE 1 MG: 1 INJECTION, SOLUTION INTRAMUSCULAR; INTRAVENOUS at 08:43

## 2018-01-17 RX ADMIN — VERAPAMIL HYDROCHLORIDE 5 MG: 2.5 INJECTION, SOLUTION INTRAVENOUS at 08:42

## 2018-01-17 RX ADMIN — Medication 1 TABLET: at 09:28

## 2018-01-17 RX ADMIN — FLUOXETINE 40 MG: 20 CAPSULE ORAL at 09:28

## 2018-01-17 RX ADMIN — LABETALOL HYDROCHLORIDE 100 MG: 100 TABLET, FILM COATED ORAL at 09:28

## 2018-01-17 RX ADMIN — SODIUM CHLORIDE 75 ML/HR: 0.9 INJECTION, SOLUTION INTRAVENOUS at 05:19

## 2018-01-17 RX ADMIN — ASPIRIN 81 MG 324 MG: 81 TABLET ORAL at 05:19

## 2018-01-17 RX ADMIN — GEMFIBROZIL 600 MG: 600 TABLET ORAL at 09:31

## 2018-01-17 RX ADMIN — FENTANYL CITRATE 50 MCG: 50 INJECTION, SOLUTION INTRAMUSCULAR; INTRAVENOUS at 08:43

## 2018-01-17 RX ADMIN — HEPARIN SODIUM 5000 UNITS: 5000 INJECTION, SOLUTION INTRAVENOUS; SUBCUTANEOUS at 05:18

## 2018-01-17 RX ADMIN — MIDAZOLAM HYDROCHLORIDE 1 MG: 1 INJECTION, SOLUTION INTRAMUSCULAR; INTRAVENOUS at 08:45

## 2018-01-17 RX ADMIN — NEPHROCAP 1 CAPSULE: 1 CAP ORAL at 09:28

## 2018-01-17 RX ADMIN — NITROGLYCERIN 200 MCG: 20 INJECTION INTRAVENOUS at 08:42

## 2018-01-17 RX ADMIN — BUPROPION HYDROCHLORIDE 200 MG: 100 TABLET, FILM COATED, EXTENDED RELEASE ORAL at 09:29

## 2018-01-17 RX ADMIN — CHOLECALCIFEROL TAB 25 MCG (1000 UNIT) 1000 UNITS: 25 TAB at 09:28

## 2018-01-17 RX ADMIN — FENTANYL CITRATE 50 MCG: 50 INJECTION, SOLUTION INTRAMUSCULAR; INTRAVENOUS at 08:36

## 2018-01-17 RX ADMIN — LIDOCAINE HYDROCHLORIDE 1 ML: 10 INJECTION, SOLUTION INFILTRATION; PERINEURAL at 08:39

## 2018-01-17 RX ADMIN — NITROGLYCERIN 200 MCG: 20 INJECTION INTRAVENOUS at 08:43

## 2018-01-17 RX ADMIN — IOHEXOL 91 ML: 350 INJECTION, SOLUTION INTRAVENOUS at 08:55

## 2018-01-17 RX ADMIN — HEPARIN SODIUM 4000 UNITS: 1000 INJECTION INTRAVENOUS; SUBCUTANEOUS at 08:42

## 2018-01-17 RX ADMIN — MIDAZOLAM HYDROCHLORIDE 2 MG: 1 INJECTION, SOLUTION INTRAMUSCULAR; INTRAVENOUS at 08:36

## 2018-01-17 NOTE — DISCHARGE SUMMARY
Discharge- Anna Courser 1965, 47 y o  female MRN: [id number]    Unit/Bed#: -01 Encounter: [id number]    Primary Care Provider: Eloy Ortiz DO   Date and time admitted to hospital: 1/14/2018  2:41 PM        Abnormal stress test   Assessment & Plan    IMPRESSIONS: Abnormal study after vasodilation and low level exercise  There was a small amount of ischemia in the anterior region  Left ventricular systolic function was normal  Diagnostic sensitivity was limited by submaximal stress  Cardiology following, pt to get cardiac catheterization tomorrow 1/17  Abnormal stress test likely d/t breast shadow, s/p normal cardiac cath  * Chest pain   Assessment & Plan    Positive FH- CAD in brothers  Reports stress test a few years ago, pt unsure of results   Atypical CP- troponins x3 negative, EKG wnl  Echo EF 24%, grade 1 diastolic dysfunction, nuclear stress results pending   Pt also has hx of anxiety and depression which could be contributing factor  Can d/c aspirin as per cardiology  Smoking cessation encouraged  S/p abnormal stress test with normal cardiac catheterization  Abnormal stress test false positive likely due to breast shadow  Depression with anxiety   Assessment & Plan    Continue Wellbutrin, Klonopin, and Prozac  F/u psychiatry as outpatient        Hyperlipidemia   Assessment & Plan    Lipid panel:  HDL 36    Continue gemfibrozil  Hypertension   Assessment & Plan    Stable, continue labetalol                Consultations During Hospital Stay:  · cardiology    Procedures Performed:     · NM myocardial perfusion spect, cardiac catheterization     Significant Findings / Test Results:     · NST: Abnormal study after vasodilation and low level exercise  There was a small amount of ischemia in the anterior region  Left ventricular systolic function was normal  Diagnostic sensitivity was limited by submaximal stress    · Cardiac catheterization: IMPRESSIONS: The coronary arteries are normal The patient's chest discomfort does not appear due to obstructive epicardial coronary artery disease  Left ventricular function is normal    -RECOMMENDATIONS: The patient should continue with the present medications  Patient management should include adding lipid lowering therapy  Incidental Findings:   · none    Test Results Pending at Discharge (will require follow up):   · none     Outpatient Tests Requested:  · F/u PCP, cardiology if sx recur    Complications:  none    Reason for Admission: chest pain    Hospital Course:     Lili Sky is a 47 y o  female patient who originally presented to the hospital on 1/14/2018 due to chest pain and SOB with exertion x2 weeks  Pt was seen by cardiology and given CAD risk factors and positive FH was schedule for NST  Nuclear stress tested showed evidence of ischemia, patient was subsequently scheduled for cardiac catheterization  Cardiac catheterization showed no evidence of coronary artery disease  NST most likely a false positive due to breast shadow  Patient was feeling well with no further episodes of chest pain and was discharged to home in good condition  Pt advised to follow up with her PCP  Please see above list of diagnoses and related plan for additional information  Condition at Discharge: good     Discharge Day Visit / Exam:     Subjective:  Seen and examined at bedside  Reports she is feeling well with no further episodes of chest pain or shortness of breath  States she is feeling relieved after cardiac catheterization revealed no blockages     Vitals: Blood Pressure: 121/67 (01/17/18 1145)  Pulse: 81 (01/17/18 1145)  Temperature: 98 1 °F (36 7 °C) (01/17/18 0804)  Temp Source: Oral (01/17/18 0804)  Respirations: 16 (01/17/18 0900)  Height: 5' 7" (170 2 cm) (01/14/18 2021)  Weight - Scale: 112 kg (246 lb 4 1 oz) (01/14/18 2021)  SpO2: 95 % (01/17/18 0900)  Exam:   Physical Exam Constitutional: She is oriented to person, place, and time  She appears well-developed  No distress  Obese, NAD   HENT:   Head: Normocephalic and atraumatic  Wears glasses     Eyes: EOM are normal  Pupils are equal, round, and reactive to light  Neck: Neck supple  Cardiovascular: Normal rate, regular rhythm, normal heart sounds and intact distal pulses  No murmur heard  Pulmonary/Chest: Effort normal and breath sounds normal  No respiratory distress  She has no wheezes  She has no rales  She exhibits no tenderness  Abdominal: Soft  Bowel sounds are normal  She exhibits no distension  There is no tenderness  There is no guarding  Musculoskeletal: Normal range of motion  She exhibits no edema  Neurological: She is alert and oriented to person, place, and time  Skin: Skin is warm and dry  She is not diaphoretic  No erythema  Psychiatric: She has a normal mood and affect  Discussion with Family: discussed with patient, family not present    Discharge instructions/Information to patient and family:   See after visit summary for information provided to patient and family  Provisions for Follow-Up Care:  See after visit summary for information related to follow-up care and any pertinent home health orders  Disposition:     Home    For Discharges to [facility] SNF:   · Not Applicable to this Patient - Not Applicable to this Patient    Planned Readmission: none     Discharge Statement:  I spent 30 minutes discharging the patient  This time was spent on the day of discharge  I had direct contact with the patient on the day of discharge  Greater than 50% of the total time was spent examining patient, answering all patient questions, arranging and discussing plan of care with patient as well as directly providing post-discharge instructions  Additional time then spent on discharge activities      Discharge Medications:  See after visit summary for reconciled discharge medications provided to patient and family        ** Please Note: This note has been constructed using a voice recognition system **

## 2018-01-17 NOTE — ASSESSMENT & PLAN NOTE
IMPRESSIONS: Abnormal study after vasodilation and low level exercise  There was a small amount of ischemia in the anterior region  Left ventricular systolic function was normal  Diagnostic sensitivity was limited by submaximal stress  Cardiology following, pt to get cardiac catheterization tomorrow 1/17  Abnormal stress test likely d/t breast shadow, s/p normal cardiac cath

## 2018-01-17 NOTE — PROGRESS NOTES
Progress Note - Cardiology   Devorah Carcamo 47 y o  female MRN: [id number]  Unit/Bed#: -[facility] Encounter: [id number]    Assessment:  60-year-old female  Multiple risk factors and family history for coronary artery disease  Had chest pain  Abnormal stress test, but normal cardiac catheterization  There was no CAD identified  She has a preserved LV function  Abnormal stress test represents a false positive, likely due to breast shadow  Does have hypertriglyceridemia  Continue treatment for this with fibrate  Does not knee aspirin  Continue treatment of blood pressure  Can follow up with primary care physician  Follow-up with cardiology if recurrence of symptoms in the future  Subjective/Objective     Subjective:  No events overnight  No further discomfort  Cardiac catheterization without any CAD  Objective:    Vitals: /62   Pulse 77   Temp 98 1 °F (36 7 °C) (Oral)   Resp 16   Ht 5' 7" (1 702 m)   Wt 112 kg (246 lb 4 1 oz)   SpO2 95%   BMI 38 57 kg/m²   Vitals:    01/14/18 1452 01/14/18 2021   Weight: 111 kg (244 lb 11 4 oz) 112 kg (246 lb 4 1 oz)     Orthostatic Blood Pressures    Flowsheet Row Most Recent Value   Blood Pressure  118/62 filed at 01/17/2018 1015   Patient Position - Orthostatic VS  Lying filed at 01/17/2018 0900          No intake or output data in the 24 hours ending 01/17/18 1121    Physical Exam:   General appearance: alert and in no acute distress  Head: Normocephalic, without obvious abnormality, atraumatic  Neck: no carotid bruit, no JVD and supple, symmetrical, trachea midline  Lungs: clear to auscultation bilaterally  Normal air entry  Normal effort  Heart: S1, S2 normal and no S3 or S4  No murmurs    Abdomen: soft, non-tender; bowel sounds normal; no masses,  no organomegaly  Extremities: extremities normal, atraumatic, no cyanosis or edema  Pulses: 2+ and symmetric bilaterally  Skin: Skin color, texture, turgor normal  No rashes or lesions  Neurologic: Grossly normal  Alert and oriented      Medications:    Current Facility-Administered Medications:     acetaminophen (TYLENOL) tablet 650 mg, 650 mg, Oral, Q6H PRN, Tristan Padilla MD, 650 mg at 01/15/18 1953    [START ON 1/18/2018] aspirin chewable tablet 81 mg, 81 mg, Oral, Daily, CAITLYN Diaz complex-vitamin C-folic acid (NEPHROCAPS) capsule 1 capsule, 1 capsule, Oral, Daily, Tristan Padilla MD, 1 capsule at 01/17/18 1660    buPROPion (WELLBUTRIN SR) 12 hr tablet 200 mg, 200 mg, Oral, Daily, Tristan Padilla MD, 200 mg at 01/17/18 0185    calcium carbonate (OYSTER SHELL,OSCAL) 500 mg tablet 1 tablet, 1 tablet, Oral, Daily With Breakfast, Tristan Padilla MD, 1 tablet at 01/17/18 3773    cholecalciferol (VITAMIN D3) tablet 1,000 Units, 1,000 Units, Oral, Daily, Tristan Padilla MD, 1,000 Units at 01/17/18 0928    clonazePAM (KlonoPIN) tablet 0 5 mg, 0 5 mg, Oral, HS, Tristan Padilla MD, 0 5 mg at 01/16/18 2030    FLUoxetine (PROzac) capsule 40 mg, 40 mg, Oral, Daily, Tristan Padilla MD, 40 mg at 01/17/18 0928    gemfibrozil (LOPID) tablet 600 mg, 600 mg, Oral, BID, Tristan Padilla MD, 600 mg at 01/17/18 0931    heparin (porcine) subcutaneous injection 5,000 Units, 5,000 Units, Subcutaneous, Q8H [address], 5,000 Units at 01/17/18 0518 **AND** Platelet count, , , Once, Tristan Padilla MD    labetalol (NORMODYNE) tablet 100 mg, 100 mg, Oral, Q12H, Tristan Padilla MD, 100 mg at 01/17/18 0269    morphine injection 2 mg, 2 mg, Intravenous, Q4H PRN, Tristan Padilla MD    nitroglycerin (NITROSTAT) SL tablet 0 4 mg, 0 4 mg, Sublingual, Q5 Min PRN, Tristan Padilla MD    [facility] injection 4 mg, 4 mg, Intravenous, Q4H PRN, Gorham MD Valerie, 4 mg at 01/14/18 2159    sodium chloride 0 9 % infusion, 75 mL/hr, Intravenous, Continuous, CAITLYN Diaz, Last Rate: 75 mL/hr at 01/17/18 0519, 75 mL/hr at 01/17/18 0519    sodium chloride 0 9 % infusion, 250 mL/hr, Intravenous, Continuous, Sreekanth GARCIA Chaparrita Dominguez MD    Lab Results:    Results from last 7 days  Lab Units 01/14/18  2220 01/14/18  1908 01/14/18  1544   TROPONIN I ng/mL <0 02 <0 02 <0 02       Results from last 7 days  Lab Units 01/17/18  0547 01/15/18  0543 01/14/18  2220 01/14/18  1544   WBC Thousand/uL 8 15 9 59  --  10 29*   HEMOGLOBIN g/dL 12 9 13 2  --  13 8   HEMATOCRIT % 37 9 41 0  --  40 9   PLATELETS Thousands/uL 308 312 302 339       Results from last 7 days  Lab Units 01/15/18  0543   CHOLESTEROL mg/dL 200   TRIGLYCERIDES mg/dL 319*   HDL mg/dL 36*       Results from last 7 days  Lab Units 01/17/18  0547 01/14/18  1544   SODIUM mmol/L 139 140   POTASSIUM mmol/L 3 6 4 1   CHLORIDE mmol/L 105 105   CO2 mmol/L 24 24   BUN mg/dL 12 13   CREATININE mg/dL 0 81 0 89   CALCIUM mg/dL 8 9 9 1   GLUCOSE RANDOM mg/dL 104 100       Results from last 7 days  Lab Units 01/17/18  0547   INR  0 91       Results from last 7 days  Lab Units 01/15/18  0543   MAGNESIUM mg/dL 2 1       Telemetry: Personally reviewed  No significant arrhythmias    Cath:  No CAD

## 2018-01-17 NOTE — ASSESSMENT & PLAN NOTE
Positive FH- CAD in brothers  Reports stress test a few years ago, pt unsure of results   Atypical CP- troponins x3 negative, EKG wnl  Echo EF 24%, grade 1 diastolic dysfunction, nuclear stress results pending   Pt also has hx of anxiety and depression which could be contributing factor  Can d/c aspirin as per cardiology  Smoking cessation encouraged  S/p abnormal stress test with normal cardiac catheterization  Abnormal stress test false positive likely due to breast shadow

## 2018-01-18 LAB
CHEST PAIN STATEMENT: NORMAL
MAX DIASTOLIC BP: 90 MMHG
MAX HEART RATE: 136 BPM
MAX PREDICTED HEART RATE: 169 BPM
MAX. SYSTOLIC BP: 144 MMHG
PROTOCOL NAME: NORMAL
REASON FOR TERMINATION: NORMAL
TARGET HR FORMULA: NORMAL
TIME IN EXERCISE PHASE: NORMAL

## 2018-01-18 NOTE — RESULT NOTES
Verified Results  [facility]) TSH+Free T4 [id number] 10:29AM Claude Hu     Test Name Result Flag Reference   TSH 1 820 uIU/mL  0 450-4 500   T4,Free(Direct) 0 78 ng/dL L 0 82-1 77     (LC) Lipid Panel With LDL/HDL Ratio [id number] 10:29AM Claude Hu     Test Name Result Flag Reference   Cholesterol, Total 238 mg/dL H 100-199   Triglycerides 320 mg/dL H 0-149   HDL Cholesterol 40 mg/dL  >39   VLDL Cholesterol Henry 64 mg/dL H 5-40   LDL Cholesterol Calc 134 mg/dL H 0-99   LDL/HDL Ratio 3 4 ratio units H 0 0-3 2   LDL/HDL Ratio                                                             Men  Women                                               1/2 Avg  Risk  1 0    1 5                                                   Avg Risk  3 6    3 2                                                2X Avg  Risk  6 2    5 0                                                3X Avg  Risk  8 0    6 1     (1) VITAMIN D 25-HYDROXY [id number] 10:29AM Claude Hu     Test Name Result Flag Reference   Vitamin D, 25-Hydroxy 24 8 ng/mL L 30 0-100 0   Vitamin D deficiency has been defined by the [address] [facility] [address] practice guideline as a  level of serum 25-OH vitamin D less than 20 ng/mL (1,2)  The Endocrine Society went on to further define vitamin D  insufficiency as a level between 21 and 29 ng/mL (2)  1  IOM ([place] [employer])  2010  Dietary reference     intakes for calcium and D  [facility]: The     [facility]  2  Missy MF, Vashti NC, Elda MEJIA, et al      Evaluation, treatment, and prevention of vitamin D     deficiency: an Endocrine Society clinical practice     guideline  JCEM  2011 Jul; 96(7):1911-30  [facility]) Keenan Sicard MPA25 Default [id number] 10:29AM Claude Hu     Test Name Result Flag Reference   Keenan Sicard CMP14 Default Comment     A hand-written panel/profile was received from your office   In  accordance with the [employer] Code Policy dated July 2003, we have completed your order by using the closest currently  or formerly recognized AMA panel  We have assigned Comprehensive  Metabolic Panel (14), Test Code #571755 to this request   If this  is not the testing you wished to receive on this specimen, please  contact the [address] [place] [address] Client Inquiry/Technical Services Department  to clarify the test order  We appreciate your business  Discussion/Summary   CHOLESTEROL IS HIGH  VITAMIN D LEVEL IS LOW  WILL DISCUSS AT NEXT VISIT   DR Gerda Juan

## 2018-01-23 NOTE — PROGRESS NOTES
Assessment    1  Well adult on routine health check (V70 0) (Z00 00)   2  Cervical cancer screening (V76 2) (Z12 4)   3  Encounter for screening mammogram for breast cancer (V76 12) (Z12 31)   4  Family history of dementia (V17 2) (Z81 8) : Mother   5  Family history of hyperlipidemia (V18 19) (Z83 49) : Sister    Plan  Benign essential hypertension, Essential hypertriglyceridemia, Iron deficiency anemia  secondary to inadequate dietary iron intake, Vitamin D deficiency    · (LC) Jayden Desai CMP14 Default; Status:Active; Requested for:48Spz1327;    · (LC) Lipid Panel With LDL/HDL Ratio; Status:Active; Requested for:71Gxy3666;    · (LC) TSH+Free T4; Status:Active; Requested for:42Hwq6087;    · (LC) Vitamin D, 25-Hydroxy, Total; Status:Active;  Requested for:64Qfd5638;   Cervical cancer screening    · [facility] - Felicita Garcia DO (Obstetrics/Gynecology) Physician Referral  Consult  Status:  Hold For - Scheduling  Requested for: 16ZPM5061  Care Summary provided  : Yes  Encounter for screening mammogram for breast cancer    · * MAMMO SCREENING BILATERAL W CAD; Status:Hold For - Scheduling; Requested  for:03Nfj3859;   Essential hypertriglyceridemia    · Gemfibrozil 600 MG Oral Tablet (Lopid); take 1 tablet by mouth twice a day  Well adult on routine health check    · Follow-up visit in 1 year Evaluation and Treatment  Follow-up  Status: Hold For -  Scheduling  Requested for: 99UNN7648   · Begin a limited exercise program ; Status:Complete;   Done: 83XQY2825   · Brush your teeth freq1 and floss at least once a day ; Status:Complete;   Done:  93CEX5252   · Eat a low fat and low cholesterol diet ; Status:Complete;   Done: 87WOK9816   · Use a sun block product with an SPF of 15 or more ; Status:Complete;   Done:  38OGU5912   · We recommend a colonoscopy ; Status:Complete;   Done: 02WHH6275   · We recommend routine visits to a dentist ; Status:Complete;   Done: 01CRF0346   · Call [phone] if: You find a new or different kind of lump in your breast ;  Status:Complete;   Done: 35LTI2050   · Call [phone] if: You have any bleeding from the vagina ; Status:Complete;    Done: 27FQY3969   · Call [phone] if: You have any warning signs of skin cancer ; Status:Complete;    Done: 16RPM4347   · Call 911 if: You experience a new kind of chest pain (angina) or pressure ;  Status:Complete;   Done: 61VZU7428    Discussion/Summary  Impression: Subsequent Annual Wellness Visit, with preventive exam as well as age and risk appropriate counseling completed  Cardiovascular screening and counseling: screening is current  Diabetes screening and counseling: screening is current  Colorectal cancer screening and counseling: screening is current  Breast cancer screening and counseling: the risks and benefits of screening were discussed  Cervical cancer screening and counseling: the risks and benefits of screening were discussed  Osteoporosis screening and counseling: screening not indicated  Abdominal aortic aneurysm screening and counseling: screening is current  Glaucoma screening and counseling: screening is current  HIV screening and counseling: screening not indicated  Advance Directive Planning: complete and up to date  Advice and education were given regarding increasing physical activity  She was referred to gynecology and psychiatry  Patient Discussion: plan discussed with the patient, follow-up visit needed in one year  History of Present Illness  Welcome to Medicare and Wellness Visits: The patient is being seen for the initial annual wellness visit  Medicare Screening and Risk Factors   Hospitalizations: no previous hospitalizations  Once per lifetime medicare screening tests: ECG  Medicare Screening Tests Risk Questions   Abdominal aortic aneurysm risk assessment: none indicated  Osteoporosis risk assessment: none indicated  HIV risk assessment: none indicated  Drug and Alcohol Use:  The patient has never smoked cigarettes  The patient reports never drinking alcohol  She has never used illicit drugs  Diet and Physical Activity: Current diet includes well balanced meals  She exercises infrequently  Exercise: walking  Mood Disorder and Cognitive Impairment Screening: She denies feeling down, depressed, or hopeless over the past two weeks  She denies feeling little interest or pleasure in doing things over the past two weeks  Cognitive impairment screening: denies difficulty learning/retaining new information, denies difficulty handling complex tasks, denies difficulty with reasoning, denies difficulty with spatial ability and orientation, denies difficulty with language and denies difficulty with behavior  Functional Ability/Level of Safety: Hearing is normal bilaterally  The patient is currently able to do activities of daily living without limitations  Activities of daily living details: does not need help using the phone, no transportation help needed, does not need help shopping, no meal preparation help needed, does not need help doing housework, does not need help doing laundry, does not need help managing medications and does not need help managing money  Fall risk factors:  polypharmacy, antidepressant use and antihypertensive use, but no alcohol use, no mobility impairment, no deconditioning, no postural hypotension, no sedative use, no visual impairment, no urinary incontinence, no cognitive impairment, up and go test was normal and no previous fall  Home safety risk factors:  no unfamiliar surroundings, no loose rugs, no poor household lighting, no uneven floors, no household clutter, grab bars in the bathroom and handrails on the stairs  Advance Directives: Advance directives: living will, durable power of  for health care directives and advance directives  end of life decisions were reviewed with the patient     Co-Managers and Medical Equipment/Suppliers: See Patient Care Team Patient Care Team    Care Team Member Role Specialty Office Number   Asael Barba MD  Orthopedic Surgery [phone]   [place], [address] [phone]     Review of Systems    Constitutional: negative  Head and Face: negative  Eyes: negative  ENT: negative  Cardiovascular: negative  Respiratory: negative  Gastrointestinal: negative  Genitourinary: negative  Musculoskeletal: negative  Integumentary and Breasts: negative  Neurological: negative  Psychiatric: negative  Endocrine: negative  Hematologic and Lymphatic: negative  Active Problems    1  Benign essential hypertension (401 1) (I10)   2  Bipolar Disorder (296 00)   3  Cervical cancer screening (V76 2) (Z12 4)   4  Depression with anxiety (300 4) (F41 8)   5  Encounter for screening mammogram for breast cancer (V76 12) (Z12 31)   6  Essential hypertriglyceridemia (272 1) (E78 1)   7  Gastroesophageal reflux disease (530 81) (K21 9)   8  Iron deficiency anemia secondary to inadequate dietary iron intake (280 1) (D50 8)   9  Localized pruritus (698 9) (L29 9)   10  Lower back pain (724 2) (M54 5)   11  Lyme disease (088 81) (A69 20)   12  Need for prophylactic vaccination and inoculation against influenza (V04 81) (Z23)   13  Nonalcoholic fatty liver disease (571 8) (K76 0)   14  Rectal bleeding (569 3) (K62 5)   15  Skin rash (782 1) (R21)   16  Snoring (786 09) (R06 83)   17  Vitamin D deficiency (268 9) (E55 9)   18  Well adult on routine health check (V70 0) (Z00 00)    Past Medical History    · Acute otitis media, unspecified laterality   · History of Hip pain, unspecified laterality   · History of abdominal pain (V13 89) (J51 863)   · History of chest pain (V13 89) (Q77 584)   · History of Neck pain (723 1) (M54 2)    The active problems and past medical history were reviewed and updated today        Surgical History    · History of Adenoidectomy   · History of Hernia Repair   · History of Tonsillectomy    The surgical history was reviewed and updated today  Family History  Mother    · Family history of dementia (V17 2) (Z81 8)   · Family history of Hypertension (V17 49)  Father    · Family history of Anxiety Disorder NOS   · Family history of Coronary Artery Disease (V17 49)   · Family history of Diabetes Mellitus (V18 0)   · Family history of Pure Hypercholesterolemia  Sister    · Family history of Diabetes Mellitus (V18 0)   · Family history of hyperlipidemia (V18 19) (Z83 49)   · Family history of Lupus  Brother    · Family history of Diverticulitis    The family history was reviewed and updated today  Social History    · Alcohol Use (History)   · Occasionally   · Denied: Drug Use   · Former smoker (C30 57) (L26 931)  The social history was reviewed and updated today  Current Meds   1  FLUoxetine HCl - 40 MG Oral Capsule; Therapy: 43ZDR8217 to (Evaluate:10Yun6278)  Requested for: 22Jan2013 Recorded   2  Gemfibrozil 600 MG Oral Tablet; take 1 tablet by mouth twice a day; Therapy: 36DIW8708 to (Evaluate:93Zrl2936)  Requested for: 62VPJ6181; Last   Rx:53Kgs7006; Status: ACTIVE - Renewal Denied Ordered   3  Labetalol HCl - 100 MG Oral Tablet; take 1 tablet by mouth twice a day; Therapy: 53DPJ5340 to (Isaac Lyn)  Requested for: 39Trg4675; Last   Rx:27Apr2015; Status: ACTIVE - Renewal Denied Ordered   4  Labetalol HCl - 100 MG Oral Tablet; TAKE 1 TABLET EVERY 12 HOURS DAILY; Therapy: 01DOW6856 to (Evaluate:57Kny5751)  Requested for: 62LMV5152; Last   Rx:30Jun2016 Ordered   5  MoviPrep 100 GM Oral Solution Reconstituted; USE AS DIRECTED; Therapy: 78GNM5841 to [phone])  Requested for: 36RZZ8324; Last   Rx:33Kqd1236 Ordered   6  Omeprazole 40 MG Oral Capsule Delayed Release; take one capsule by mouth daily; Therapy: 55Tpz4090 to ([place])  Requested for: 34Tbs6754; Last   Rx:65Hzt8656; Status: ACTIVE - Renewal Denied Ordered   7   Vitamin B Complex Oral Tablet; Therapy: (Recorded:28Unu2429) to Recorded   8  Vitamin D 1000 UNIT CAPS; Therapy: (Recorded:31Zii7283) to Recorded   9  Wellbutrin  MG Oral Tablet Extended Release 12 Hour; Take 1 tablet twice a day; Therapy: 36ASX4743 to Recorded    The medication list was reviewed and updated today  Allergies    1  Lipitor TABS   2  Penicillins    Immunizations   1    Influenza  16-Oct-2014     Vitals  Signs    Blood Pressure: 124 mm Hg  Blood Pressure: 72 mm Hg  Heart Rate: 80  Respiration: 20  Height: 5 ft 6 97 in  Weight: 232 lb 4 oz  BMI Calculated: 36 41 kg/m2  BSA Calculated: 2 15 m2    Physical Exam    Constitutional   General appearance: No acute distress, well appearing and well nourished  Head and Face   Head and face: Normal     Eyes   Conjunctiva and lids: No swelling, erythema or discharge  Pupils and irises: Equal, round, reactive to light  Ears, Nose, Mouth, and Throat   External inspection of ears and nose: Normal     Otoscopic examination: Tympanic membranes translucent with normal light reflex  Canals patent without erythema  Hearing: Normal     Nasal mucosa, septum, and turbinates: Normal without edema or erythema  Lips, teeth, and gums: Normal, good dentition  Oropharynx: Normal with no erythema, edema, exudate or lesions  Neck   Neck: Supple, symmetric, trachea midline, no masses  Thyroid: Normal, no thyromegaly  Pulmonary   Respiratory effort: No increased work of breathing or signs of respiratory distress  Auscultation of lungs: Clear to auscultation  Cardiovascular   Auscultation of heart: Normal rate and rhythm, normal S1 and S2, no murmurs  Examination of extremities for edema and/or varicosities: Normal     Abdomen   Abdomen: Non-tender, no masses  Liver and spleen: No hepatomegaly or splenomegaly  Lymphatic   Palpation of lymph nodes in neck: No lymphadenopathy  Palpation of lymph nodes in axillae: No lymphadenopathy  Musculoskeletal   Gait and station: Normal     Digits and nails: Normal without clubbing or cyanosis  Joints, bones, and muscles: Normal     Range of motion: Normal     Stability: Normal     Muscle strength/tone: Normal     Skin   Skin and subcutaneous tissue: Normal without rashes or lesions  Palpation of skin and subcutaneous tissue: Normal turgor  Neurologic   Cranial nerves: Cranial nerves II-XII intact  Cortical function: Normal mental status  Reflexes: 2+ and symmetric  Sensation: No sensory loss  Coordination: Normal finger to nose and heel to shin  Psychiatric   Judgment and insight: Normal     Orientation to person, place, and time: Normal     Recent and remote memory: Intact  Mood and affect: Normal        Health Management  Rectal bleeding   COLONOSCOPY; every 10 years; Last 11RCV4326; Next Due: 29JOB0533;  Active    Signatures   Electronically signed by : Meghna Mendoza DO; Aug 16 2016  8:54AM EST                       (Author)

## 2018-02-13 NOTE — MISCELLANEOUS
Provider Comments  Provider Comments:   Our records indicate that you missed an appointment on 10/31/2017  If you have not done so already, please call our office and we will be happy to schedule another appointment for you  If you must cancel your appointment, our office policy requires you to call our office at least 24 hours in advance so that we may utilize your appointment time for another patient who requires our services  If you have any questions, please contact our office at [phone]           Signatures   Electronically signed by : Titus Pink, ; Nov 1 2017 12:02PM EST                       (Author)

## 2018-06-07 ENCOUNTER — OFFICE VISIT (OUTPATIENT)
Dept: FAMILY MEDICINE CLINIC | Facility: CLINIC | Age: 53
End: 2018-06-07
Payer: MEDICARE

## 2018-06-07 VITALS
RESPIRATION RATE: 18 BRPM | BODY MASS INDEX: 37.98 KG/M2 | HEART RATE: 80 BPM | WEIGHT: 242 LBS | DIASTOLIC BLOOD PRESSURE: 88 MMHG | SYSTOLIC BLOOD PRESSURE: 120 MMHG | HEIGHT: 67 IN

## 2018-06-07 DIAGNOSIS — R73.01 IFG (IMPAIRED FASTING GLUCOSE): ICD-10-CM

## 2018-06-07 DIAGNOSIS — I10 BENIGN ESSENTIAL HYPERTENSION: Primary | ICD-10-CM

## 2018-06-07 DIAGNOSIS — E78.1 ESSENTIAL HYPERTRIGLYCERIDEMIA: ICD-10-CM

## 2018-06-07 PROBLEM — R94.39 ABNORMAL STRESS TEST: Status: RESOLVED | Noted: 2018-01-16 | Resolved: 2018-06-07

## 2018-06-07 PROBLEM — R07.9 CHEST PAIN: Status: RESOLVED | Noted: 2018-01-14 | Resolved: 2018-06-07

## 2018-06-07 PROCEDURE — 99214 OFFICE O/P EST MOD 30 MIN: CPT | Performed by: FAMILY MEDICINE

## 2018-06-07 RX ORDER — GEMFIBROZIL 600 MG/1
600 TABLET, FILM COATED ORAL 2 TIMES DAILY
Qty: 60 TABLET | Refills: 5 | Status: SHIPPED | OUTPATIENT
Start: 2018-06-07 | End: 2018-11-29 | Stop reason: SDUPTHER

## 2018-06-07 RX ORDER — LABETALOL 100 MG/1
100 TABLET, FILM COATED ORAL EVERY 12 HOURS
Qty: 60 TABLET | Refills: 5 | Status: SHIPPED | OUTPATIENT
Start: 2018-06-07 | End: 2018-11-29 | Stop reason: SDUPTHER

## 2018-06-07 NOTE — PROGRESS NOTES
Assessment/Plan:    Problem List Items Addressed This Visit     Benign essential hypertension - Primary     bp stable  Restart labetolol         Relevant Medications    labetalol (NORMODYNE) 100 mg tablet    Other Relevant Orders    CBC    Comprehensive metabolic panel    Essential hypertriglyceridemia     Cont lopid         Relevant Medications    gemfibrozil (LOPID) 600 mg tablet    Other Relevant Orders    Lipid Panel with Direct LDL reflex    TSH, 3rd generation with T4 reflex    IFG (impaired fasting glucose)     Check a1c         Relevant Orders    Hemoglobin A1C          Patient Instructions     Chronic Hypertension   WHAT YOU NEED TO KNOW:   Hypertension is high blood pressure (BP)  Your BP is the force of your blood moving against the walls of your arteries  Normal BP is less than 120/80  Prehypertension is between 120/80 and 139/89  Hypertension is 140/90 or higher  Hypertension causes your BP to get so high that your heart has to work much harder than normal  This can damage your heart  Chronic hypertension is a long-term condition that you can control with a healthy lifestyle or medicines  A controlled blood pressure helps protect your organs, such as your heart, lungs, brain, and kidneys  DISCHARGE INSTRUCTIONS:   Call 911 for any of the following:   · You have discomfort in your chest that feels like squeezing, pressure, fullness, or pain  · You become confused or have difficulty speaking  · You suddenly feel lightheaded or have trouble breathing  · You have pain or discomfort in your back, neck, jaw, stomach, or arm  Seek care immediately if:   · You have a severe headache or vision loss  · You have weakness in an arm or leg  Contact your healthcare provider if:   · You feel faint, dizzy, confused, or drowsy  · You have been taking your BP medicine and your BP is still higher than your healthcare provider says it should be      · You have questions or concerns about your condition or care  Medicines: You may need any of the following:  · Medicine  may be used to help lower your BP  You may need more than one type of medicine  Take the medicine exactly as directed  · Diuretics  help decrease extra fluid that collects in your body  This will help lower your BP  You may urinate more often while you take this medicine  · Cholesterol medicine  helps lower your cholesterol level  A low cholesterol level helps prevent heart disease and makes it easier to control your blood pressure  · Take your medicine as directed  Contact your healthcare provider if you think your medicine is not helping or if you have side effects  Tell him or her if you are allergic to any medicine  Keep a list of the medicines, vitamins, and herbs you take  Include the amounts, and when and why you take them  Bring the list or the pill bottles to follow-up visits  Carry your medicine list with you in case of an emergency  Follow up with your healthcare provider as directed: You will need to return to have your blood pressure checked and to have other lab tests done  Write down your questions so you remember to ask them during your visits  Manage chronic hypertension:  Talk with your healthcare provider about these and other ways to manage hypertension:  · Take your BP at home  Sit and rest for 5 minutes before you take your BP  Extend your arm and support it on a flat surface  Your arm should be at the same level as your heart  Follow the directions that came with your BP monitor  If possible, take at least 2 BP readings each time  Take your BP at least twice a day at the same times each day, such as morning and evening  Keep a record of your BP readings and bring it to your follow-up visits  Ask your healthcare provider what your blood pressure should be  · Limit sodium (salt) as directed  Too much sodium can affect your fluid balance  Check labels to find low-sodium or no-salt-added foods   Some low-sodium foods use potassium salts for flavor  Too much potassium can also cause health problems  Your healthcare provider will tell you how much sodium and potassium are safe for you to have in a day  He or she may recommend that you limit sodium to 2,300 mg a day  · Follow the meal plan recommended by your healthcare provider  A dietitian or your provider can give you more information on low-sodium plans or the DASH (Dietary Approaches to Stop Hypertension) eating plan  The DASH plan is low in sodium, unhealthy fats, and total fat  It is high in potassium, calcium, and fiber  · Exercise to maintain a healthy weight  Exercise at least 30 minutes per day, on most days of the week  This will help decrease your blood pressure  Ask about the best exercise plan for you  · Decrease stress  This may help lower your BP  Learn ways to relax, such as deep breathing or listening to music  · Limit alcohol  Women should limit alcohol to 1 drink a day  Men should limit alcohol to 2 drinks a day  A drink of alcohol is 12 ounces of beer, 5 ounces of wine, or 1½ ounces of liquor  · Do not smoke  Nicotine and other chemicals in cigarettes and cigars can increase your BP and also cause lung damage  Ask your healthcare provider for information if you currently smoke and need help to quit  E-cigarettes or smokeless tobacco still contain nicotine  Talk to your healthcare provider before you use these products  © 2017 [address] Information is for End User's use only and may not be sold, redistributed or otherwise used for commercial purposes  All illustrations and images included in CareNotes® are the copyrighted property of [employer]  or [address]  The above information is an  only  It is not intended as medical advice for individual conditions or treatments   Talk to your doctor, nurse or pharmacist before following any medical regimen to see if it is safe and effective for you  No Follow-up on file  Subjective:      Patient ID: Dana Lorenzo is a 47 y o  female  Chief Complaint   Patient presents with    Follow-up     Patient here for follow up on Hypertension, Hyperlipidemia,        Here for follow up  Has been off labetolol bc rx ran out  No symptoms going off labetolol, but may have been masked to clonipine      Hypertension   This is a chronic problem  The current episode started more than 1 year ago  The problem is unchanged  The problem is controlled  Associated symptoms include anxiety  Pertinent negatives include no blurred vision, chest pain, headaches, malaise/fatigue, neck pain, orthopnea, palpitations, peripheral edema, PND, shortness of breath or sweats  There are no associated agents to hypertension  Risk factors for coronary artery disease include dyslipidemia  Past treatments include beta blockers  The current treatment provides significant improvement  There are no compliance problems  The following portions of the patient's history were reviewed and updated as appropriate: allergies, current medications, past family history, past medical history, past social history, past surgical history and problem list     Review of Systems   Constitutional: Negative  Negative for malaise/fatigue  HENT: Negative  Eyes: Negative for blurred vision  Respiratory: Negative for shortness of breath  Cardiovascular: Negative for chest pain, palpitations, orthopnea and PND  Gastrointestinal: Negative  Endocrine: Negative  Genitourinary: Negative  Musculoskeletal: Negative for neck pain  Allergic/Immunologic: Negative  Neurological: Negative for headaches  Hematological: Negative  Psychiatric/Behavioral: Negative            Current Outpatient Prescriptions   Medication Sig Dispense Refill    B COMPLEX VITAMINS PO Take 1 tablet by mouth daily      buPROPion (WELLBUTRIN SR) 200 MG 12 hr tablet Take 1 tablet by mouth daily      Calcium Carb-Cholecalciferol (CALCIUM 600 + D PO) Take 1 tablet by mouth daily      Cholecalciferol (VITAMIN D3) 1000 units CAPS Take 1 capsule by mouth daily      clonazePAM (KlonoPIN) 0 5 mg tablet Take 1 tablet by mouth daily at bedtime      FLUoxetine (PROzac) 40 MG capsule Take 1 capsule by mouth daily      Flax OIL Take 1 capsule by mouth daily      gemfibrozil (LOPID) 600 mg tablet Take 1 tablet (600 mg total) by mouth 2 (two) times a day 60 tablet 5    labetalol (NORMODYNE) 100 mg tablet Take 1 tablet (100 mg total) by mouth every 12 (twelve) hours 60 tablet 5     No current facility-administered medications for this visit  Objective:    /88   Pulse 80   Resp 18   Ht 5' 7" (1 702 m)   Wt 110 kg (242 lb)   BMI 37 90 kg/m²        Physical Exam   Constitutional: She appears well-developed and well-nourished  Eyes: EOM are normal  Pupils are equal, round, and reactive to light  Neck: Normal range of motion  Neck supple  Cardiovascular: Normal rate, regular rhythm, normal heart sounds and intact distal pulses  Pulmonary/Chest: Effort normal and breath sounds normal    Abdominal: Soft  Musculoskeletal: Normal range of motion  Neurological: She is alert  Skin: Skin is warm and dry  Nursing note and vitals reviewed               Jaja Padilla DO

## 2018-06-07 NOTE — PATIENT INSTRUCTIONS

## 2018-11-29 DIAGNOSIS — I10 BENIGN ESSENTIAL HYPERTENSION: ICD-10-CM

## 2018-11-29 DIAGNOSIS — E78.1 ESSENTIAL HYPERTRIGLYCERIDEMIA: ICD-10-CM

## 2018-11-29 RX ORDER — LABETALOL 100 MG/1
TABLET, FILM COATED ORAL
Qty: 60 TABLET | Refills: 5 | Status: SHIPPED | OUTPATIENT
Start: 2018-11-29 | End: 2019-09-05 | Stop reason: SDUPTHER

## 2018-11-29 RX ORDER — GEMFIBROZIL 600 MG/1
600 TABLET, FILM COATED ORAL 2 TIMES DAILY
Qty: 60 TABLET | Refills: 5 | Status: SHIPPED | OUTPATIENT
Start: 2018-11-29 | End: 2019-01-09 | Stop reason: SDUPTHER

## 2018-12-10 ENCOUNTER — OFFICE VISIT (OUTPATIENT)
Dept: FAMILY MEDICINE CLINIC | Facility: CLINIC | Age: 53
End: 2018-12-10
Payer: MEDICARE

## 2018-12-10 VITALS
SYSTOLIC BLOOD PRESSURE: 152 MMHG | RESPIRATION RATE: 15 BRPM | TEMPERATURE: 98.6 F | HEART RATE: 104 BPM | WEIGHT: 243.8 LBS | OXYGEN SATURATION: 97 % | HEIGHT: 67 IN | DIASTOLIC BLOOD PRESSURE: 102 MMHG | BODY MASS INDEX: 38.27 KG/M2

## 2018-12-10 DIAGNOSIS — Z23 ENCOUNTER FOR IMMUNIZATION: ICD-10-CM

## 2018-12-10 DIAGNOSIS — I10 BENIGN ESSENTIAL HYPERTENSION: ICD-10-CM

## 2018-12-10 DIAGNOSIS — F30.9 BIPOLAR I DISORDER, SINGLE MANIC EPISODE (HCC): ICD-10-CM

## 2018-12-10 DIAGNOSIS — Z00.00 MEDICARE ANNUAL WELLNESS VISIT, SUBSEQUENT: Primary | ICD-10-CM

## 2018-12-10 DIAGNOSIS — Z12.39 SCREENING FOR MALIGNANT NEOPLASM OF BREAST: ICD-10-CM

## 2018-12-10 PROCEDURE — G0008 ADMIN INFLUENZA VIRUS VAC: HCPCS

## 2018-12-10 PROCEDURE — G0439 PPPS, SUBSEQ VISIT: HCPCS | Performed by: FAMILY MEDICINE

## 2018-12-10 PROCEDURE — 90686 IIV4 VACC NO PRSV 0.5 ML IM: CPT

## 2018-12-10 NOTE — PROGRESS NOTES
Assessment and Plan:    Problem List Items Addressed This Visit     Benign essential hypertension     Not taking bp meds regularly- has forgotten         Bipolar I disorder, single manic episode ([facility] [address] ) - Primary     Seeing psych           Other Visit Diagnoses     Medicare annual wellness visit, subsequent        Screening for malignant neoplasm of breast        Relevant Orders    Mammo screening bilateral w cad    Encounter for immunization        Relevant Orders    SYRINGE/SINGLE-DOSE VIAL: influenza vaccine, 3302-2021, quadrivalent, 0 5 mL, preservative-free, for patients 3+ yr (FLUZONE)        Health Maintenance Due   Topic Date Due    MAMMOGRAM  1966    DTaP,Tdap,and Td Vaccines (1 - Tdap) 02/06/1987    PAP SMEAR  02/06/1987    INFLUENZA VACCINE  07/01/2018         HPI:  Ramon Gunn is a 47 y o  female here for her Subsequent Wellness Visit      Patient Active Problem List   Diagnosis    Hyperlipidemia    Depression with anxiety    Benign essential hypertension    Bipolar I disorder, single manic episode ([facility] [address] )    Essential hypertriglyceridemia    Gastroesophageal reflux disease    IFG (impaired fasting glucose)    Iron deficiency anemia secondary to inadequate dietary iron intake    Localized pruritus    Nonalcoholic fatty liver disease    Snoring    Vitamin D deficiency     Past Medical History:   Diagnosis Date    Anxiety     Bipolar disorder ([facility] [address] )     Hyperlipidemia     Hypertension     Lyme disease     Psychiatric disorder     Shortness of breath on exertion     Vitamin D deficiency      Past Surgical History:   Procedure Laterality Date    ADENOIDECTOMY      ENDOMETRIAL ABLATION      HERNIA REPAIR      TONSILLECTOMY       Family History   Problem Relation Age of Onset    Dementia Mother     Hypertension Mother     Anxiety disorder Father         NOS    Coronary artery disease Father     Diabetes Father         mellitus    Hyperlipidemia Father         pure hypercholesterolemia    Hyperlipidemia Sister     Lupus Sister     Diabetes Sister         mellitus    Diverticulitis Brother      History   Smoking Status    Former Smoker    Quit date: 12/23/2017   Smokeless Tobacco    Never Used     History   Alcohol Use No     Comment: alcohol use (history):occasionally (as per allscripts)      History   Drug Use No       Current Outpatient Prescriptions   Medication Sig Dispense Refill    buPROPion (WELLBUTRIN SR) 200 MG 12 hr tablet Take 1 tablet by mouth daily      clonazePAM (KlonoPIN) 0 5 mg tablet Take 1 tablet by mouth daily at bedtime      FLUoxetine (PROzac) 40 MG capsule Take 1 capsule by mouth daily      gemfibrozil (LOPID) 600 mg tablet TAKE 1 TABLET (600 MG TOTAL) BY MOUTH 2 (TWO) TIMES A DAY 60 tablet 5    labetalol (NORMODYNE) 100 mg tablet TAKE 1 TABLET BY MOUTH EVERY 12 HOURS 60 tablet 5     No current facility-administered medications for this visit  Allergies   Allergen Reactions    Atorvastatin Confusion    Penicillins Swelling, Rash and Edema     Immunization History   Administered Date(s) Administered    Influenza 10/16/2014, 02/23/2017    Influenza Quadrivalent Preservative Free 3 years and older IM 10/16/2014, 02/23/2017       Patient Care Team:  Tawanda Yeung DO as PCP - General  DO Tonio Gagnon MD    Medicare Screening Tests and Risk Assessments:  Lashawn Arrington is here for her Subsequent Wellness visit  Health Risk Assessment:  Patient rates overall health as good  Patient feels that their physical health rating is Slightly better  Eyesight was rated as Slightly worse  Hearing was rated as Same  Patient feels that their emotional and mental health rating is Same  Pain experienced by patient in the last 7 days has been Some  Patient's pain rating has been 3/10  Emotional/Mental Health:  Patient has been feeling nervous/anxious      PHQ-9 Depression Screening:    Frequency of the following problems over the past two weeks:      1  Little interest or pleasure in doing things: 0 - not at all      2  Feeling down, depressed, or hopeless: 0 - not at all  PHQ-2 Score: 0          Broken Bones/Falls: Fall Risk Assessment:    In the past year, patient has experienced: History of falling in past year     Number of falls: greater than 3  Patient does not feel she is unsteady standing  Patient is not taking medication that can cause feelings of lightheadedness or tiredness  Patient often has no need to rush to the toilet  Bladder/Bowel:  Patient has not leaked urine accidently in the last six months  Patient reports no loss of bowel control  Immunizations:  Patient has not had a flu vaccination within the last year  Patient has not received a pneumonia shot  Patient has not received a shingles shot  Patient has not received tetanus/diphtheria shot  Home Safety:  Patient does not have trouble with stairs inside or outside of their home  Patient currently reports that there are no safety hazards present in home, working smoke alarms, working carbon monoxide detectors  Preventative Screenings:   No breast cancer screening performed, colon cancer screen completed, cholesterol screen completed, no glaucoma eye exam completed    Nutrition:  Current diet: Regular with servings of the following:    Medications:  Patient is not currently taking any over-the-counter supplements  Patient is able to manage medications  Lifestyle Choices:  Patient reports no tobacco use  Patient has smoked or used tobacco in the past   Patient has stopped her tobacco use  Tobacco use quit date: 12/2017  Patient reports no alcohol use  Patient drives a vehicle  Patient wears seat belt          Activities of Daily Living:  Can get out of bed by his or her self, able to dress self, able to make own meals, able to do own shopping, able to bathe self, can do own laundry/housekeeping, can manage own money, pay bills and track expenses    Previous Hospitalizations:  No hospitalization or ED visit in past 12 months        Advanced Directives:  Patient has not decided on power of   Patient has not completed advanced directive  Preventative Screening/Counseling:      Cardiovascular:      General: Screening Current          Diabetes:      General: Screening Current          Colorectal Cancer:      General: Screening Current          Breast Cancer:      General: Risks and Benefits Discussed          Cervical Cancer:      General: Risks and Benefits Discussed          Osteoporosis:      General: Screening Not Indicated          AAA:      General: Screening Not Indicated          Glaucoma:      General: Risks and Benefits Discussed          HIV:      General: Screening Not Indicated          Hepatitis C:      General: Screening Not Indicated        Advanced Directives:   Patient has no living will for healthcare, does not have durable POA for healthcare, patient has an advanced directive  Information on ACP and/or AD provided  5 wishes given  Immunizations:      Influenza: Influenza Due Today      Other Preventative Counseling (Non-Medicare):   Increase physical activity

## 2018-12-10 NOTE — PATIENT INSTRUCTIONS
Obesity   AMBULATORY CARE:   Obesity  is when your body mass index (BMI) is greater than 30  Your healthcare provider will use your height and weight to measure your BMI  The risks of obesity include  many health problems, such as injuries or physical disability  You may need tests to check for the following:  · Diabetes     · High blood pressure or high cholesterol     · Heart disease     · Gallbladder or liver disease     · Cancer of the colon, breast, prostate, liver, or kidney     · Sleep apnea     · Arthritis or gout  Seek care immediately if:   · You have a severe headache, confusion, or difficulty speaking  · You have weakness on one side of your body  · You have chest pain, sweating, or shortness of breath  Contact your healthcare provider if:   · You have symptoms of gallbladder or liver disease, such as pain in your upper abdomen  · You have knee or hip pain and discomfort while walking  · You have symptoms of diabetes, such as intense hunger and thirst, and frequent urination  · You have symptoms of sleep apnea, such as snoring or daytime sleepiness  · You have questions or concerns about your condition or care  Treatment for obesity  focuses on helping you lose weight to improve your health  Even a small decrease in BMI can reduce the risk for many health problems  Your healthcare provider will help you set a weight-loss goal   · Lifestyle changes  are the first step in treating obesity  These include making healthy food choices and getting regular physical activity  Your healthcare provider may suggest a weight-loss program that involves coaching, education, and therapy  · Medicine  may help you lose weight when it is used with a healthy diet and physical activity  · Surgery  can help you lose weight if you are very obese and have other health problems  There are several types of weight-loss surgery  Ask your healthcare provider for more information    Be successful losing weight:   · Set small, realistic goals  An example of a small goal is to walk for 20 minutes 5 days a week  Anther goal is to lose 5% of your body weight  · Tell friends, family members, and coworkers about your goals  and ask for their support  Ask a friend to lose weight with you, or join a weight-loss support group  · Identify foods or triggers that may cause you to overeat , and find ways to avoid them  Remove tempting high-calorie foods from your home and workplace  Place a bowl of fresh fruit on your kitchen counter  If stress causes you to eat, then find other ways to cope with stress  · Keep a diary to track what you eat and drink  Also write down how many minutes of physical activity you do each day  Weigh yourself once a week and record it in your diary  Eating changes: You will need to eat 500 to 1,000 fewer calories each day than you currently eat to lose 1 to 2 pounds a week  The following changes will help you cut calories:  · Eat smaller portions  Use small plates, no larger than 9 inches in diameter  Fill your plate half full of fruits and vegetables  Measure your food using measuring cups until you know what a serving size looks like  · Eat 3 meals and 1 or 2 snacks each day  Plan your meals in advance  Diann Lange and eat at home most of the time  Eat slowly  · Eat fruits and vegetables at every meal   They are low in calories and high in fiber, which makes you feel full  Do not add butter, margarine, or cream sauce to vegetables  Use herbs to season steamed vegetables  · Eat less fat and fewer fried foods  Eat more baked or grilled chicken and fish  These protein sources are lower in calories and fat than red meat  Limit fast food  Dress your salads with olive oil and vinegar instead of bottled dressing  · Limit the amount of sugar you eat  Do not drink sugary beverages  Limit alcohol  Activity changes:  Physical activity is good for your body in many ways   It helps you burn calories and build strong muscles  It decreases stress and depression, and improves your mood  It can also help you sleep better  Talk to your healthcare provider before you begin an exercise program   · Exercise for at least 30 minutes 5 days a week  Start slowly  Set aside time each day for physical activity that you enjoy and that is convenient for you  It is best to do both weight training and an activity that increases your heart rate, such as walking, bicycling, or swimming  · Find ways to be more active  Do yard work and housecleaning  Walk up the stairs instead of using elevators  Spend your leisure time going to events that require walking, such as outdoor festivals or fairs  This extra physical activity can help you lose weight and keep it off  Follow up with your healthcare provider as directed: You may need to meet with a dietitian  Write down your questions so you remember to ask them during your visits  © 2017 [address] [employer] Information is for End User's use only and may not be sold, redistributed or otherwise used for commercial purposes  All illustrations and images included in CareNotes® are the copyrighted property of [employer]  or Darrius Corbin  The above information is an  only  It is not intended as medical advice for individual conditions or treatments  Talk to your doctor, nurse or pharmacist before following any medical regimen to see if it is safe and effective for you  Urinary Incontinence   WHAT YOU NEED TO KNOW:   What is urinary incontinence? Urinary incontinence (UI) is when you lose control of your bladder  What causes UI? UI occurs because your bladder cannot store or empty urine properly  The following are the most common types of UI:  · Stress incontinence  is when you leak urine due to increased bladder pressure  This may happen when you cough, sneeze, or exercise       · Urge incontinence  is when you feel the need to urinate right away and leak urine accidentally  · Mixed incontinence  is when you have both stress and urge UI  What are the signs and symptoms of UI?   · You feel like your bladder does not empty completely when you urinate  · You urinate often and need to urinate immediately  · You leak urine when you sleep, or you wake up with the urge to urinate  · You leak urine when you cough, sneeze, exercise, or laugh  How is UI diagnosed? Your healthcare provider will ask how often you leak urine and whether you have stress or urge symptoms  Tell him which medicines you take, how often you urinate, and how much liquid you drink each day  You may need any of the following tests:  · Urine tests  may show infection or kidney function  · A pelvic exam  may be done to check for blockages  A pelvic exam will also show if your bladder, uterus, or other organs have moved out of place  · An x-ray, ultrasound, or CT  may show problems with parts of your urinary system  You may be given contrast liquid to help your organs show up better in the pictures  Tell the healthcare provider if you have ever had an allergic reaction to contrast liquid  Do not enter the MRI room with anything metal  Metal can cause serious injury  Tell the healthcare provider if you have any metal in or on your body  · A bladder scan  will show how much urine is left in your bladder after you urinate  You will be asked to urinate and then healthcare providers will use a small ultrasound machine to check the urine left in your bladder  · Cystometry  is used to check the function of your urinary system  Your healthcare provider checks the pressure in your bladder while filling it with fluid  Your bladder pressure may also be tested when your bladder is full and while you urinate  How is UI treated? · Medicines  can help strengthen your bladder control      · Electrical stimulation  is used to send a small amount of electrical energy to your pelvic floor muscles  This helps control your bladder function  Electrodes may be placed outside your body or in your rectum  For women, the electrodes may be placed in the vagina  · A bulking agent  may be injected into the wall of your urethra to make it thicker  This helps keep your urethra closed and decreases urine leakage  · Devices  such as a clamp, pessary, or tampon may help stop urine leaks  Ask your healthcare provider for more information about these and other devices  · Surgery  may be needed if other treatments do not work  Several types of surgery can help improve your bladder control  Ask your healthcare provider for more information about the surgery you may need  How can I manage my symptoms? · Do pelvic muscle exercises often  Your pelvic muscles help you stop urinating  Squeeze these muscles tight for 5 seconds, then relax for 5 seconds  Gradually work up to squeezing for 10 seconds  Do 3 sets of 15 repetitions a day, or as directed  This will help strengthen your pelvic muscles and improve bladder control  · A catheter  may be used to help empty your bladder  A catheter is a tiny, plastic tube that is put into your bladder to drain your urine  Your healthcare provider may tell you to use a catheter to prevent your bladder from getting too full and leaking urine  · Keep a UI record  Write down how often you leak urine and how much you leak  Make a note of what you were doing when you leaked urine  · Train your bladder  Go to the bathroom at set times, such as every 2 hours, even if you do not feel the urge to go  You can also try to hold your urine when you feel the urge to go  For example, hold your urine for 5 minutes when you feel the urge to go  As that becomes easier, hold your urine for 10 minutes  · Drink liquids as directed  Ask your healthcare provider how much liquid to drink each day and which liquids are best for you   You may need to limit the amount of liquid you drink to help control your urine leakage  Limit or do not have drinks that contain caffeine or alcohol  Do not drink any liquid right before you go to bed  · Prevent constipation  Eat a variety of high-fiber foods  Good examples are high-fiber cereals, beans, vegetables, and whole-grain breads  Prune juice may help make your bowel movement softer  Walking is the best way to trigger your intestines to have a bowel movement  · Exercise regularly and maintain a healthy weight  Ask your healthcare provider how much you should weigh and about the best exercise plan for you  Weight loss and exercise will decrease pressure on your bladder and help you control your leakage  Ask him to help you create a weight loss plan if you are overweight  When should I seek immediate care? · You have severe pain  · You are confused or cannot think clearly  When should I contact my healthcare provider? · You have a fever  · You see blood in your urine  · You have pain when you urinate  · You have new or worse pain, even after treatment  · Your mouth feels dry or you have vision changes  · Your urine is cloudy or smells bad  · You have questions or concerns about your condition or care  CARE AGREEMENT:   You have the right to help plan your care  Learn about your health condition and how it may be treated  Discuss treatment options with your caregivers to decide what care you want to receive  You always have the right to refuse treatment  The above information is an  only  It is not intended as medical advice for individual conditions or treatments  Talk to your doctor, nurse or pharmacist before following any medical regimen to see if it is safe and effective for you  © 2017 [address] [employer] Information is for End User's use only and may not be sold, redistributed or otherwise used for commercial purposes   All illustrations and images included in CareNotes® are the copyrighted property of [employer] M [employer] Inc  or Darrius Corbin  Cigarette Smoking and Your Health   AMBULATORY CARE:   Risks to your health if you smoke:  Nicotine and other chemicals found in tobacco damage every cell in your body  Even if you are a light smoker, you have an increased risk for cancer, heart disease, and lung disease  If you are pregnant or have diabetes, smoking increases your risk for complications  Benefits to your health if you stop smoking:   · You decrease respiratory symptoms such as coughing, wheezing, and shortness of breath  · You reduce your risk for cancers of the lung, mouth, throat, kidney, bladder, pancreas, stomach, and cervix  If you already have cancer, you increase the benefits of chemotherapy  You also reduce your risk for cancer returning or a second cancer from developing  · You reduce your risk for heart disease, blood clots, heart attack, and stroke  · You reduce your risk for lung infections, and diseases such as pneumonia, asthma, chronic bronchitis, and emphysema  · Your circulation improves  More oxygen can be delivered to your body  If you have diabetes, you lower your risk for complications, such as kidney, artery, and eye diseases  You also lower your risk for nerve damage  Nerve damage can lead to amputations, poor vision, and blindness  · You improve your body's ability to heal and to fight infections  Benefits to the health of others if you stop smoking:  Tobacco is harmful to nonsmokers who breathe in your secondhand smoke  The following are ways the health of others around you may improve when you stop smoking:  · You lower the risks for lung cancer and heart disease in nonsmoking adults  · If you are pregnant, you lower the risk for miscarriage, early delivery, low birth weight, and stillbirth  You also lower your baby's risk for SIDS, obesity, developmental delay, and neurobehavioral problems, such as ADHD  · If you have children, you lower their risk for ear infections, colds, pneumonia, bronchitis, and asthma  For more information and support to stop smoking:   · [employer]  Phone: 0- 000 - 864-1811  Web Address: www smokefree  gov  Follow up with your healthcare provider as directed:  Write down your questions so you remember to ask them during your visits  © 2017 [address] [employer] Information is for End User's use only and may not be sold, redistributed or otherwise used for commercial purposes  All illustrations and images included in CareNotes® are the copyrighted property of A D [employer] M , Inc  or Darrius Corbin  The above information is an  only  It is not intended as medical advice for individual conditions or treatments  Talk to your doctor, nurse or pharmacist before following any medical regimen to see if it is safe and effective for you  Fall Prevention   AMBULATORY CARE:   Fall prevention  includes ways to make your home and other areas safer  It also includes ways you can move more carefully to prevent a fall  Health conditions that cause changes in your blood pressure, vision, or muscle strength and coordination may increase your risk for falls  Medicines may also increase your risk for falls if they make you dizzy, weak, or sleepy  Call 911 or have someone else call if:   · You have fallen and are unconscious  · You have fallen and cannot move part of your body  Contact your healthcare provider if:   · You have fallen and have pain or a headache  · You have questions or concerns about your condition or care  Fall prevention tips:   · Stand or sit up slowly  This may help you keep your balance and prevent falls  · Use assistive devices as directed  Your healthcare provider may suggest that you use a cane or walker to help you keep your balance  You may need to have grab bars put in your bathroom near the toilet or in the shower      · Wear shoes that fit well and have soles that   Wear shoes both inside and outside  Use slippers with good   Do not wear shoes with high heels  · Wear a personal alarm  This is a device that allows you to call 911 if you fall and need help  Ask your healthcare provider for more information  · Stay active  Exercise can help strengthen your muscles and improve your balance  Your healthcare provider may recommend water aerobics or walking  He or she may also recommend physical therapy to improve your coordination  Never start an exercise program without talking to your healthcare provider first      · Manage your medical conditions  Keep all appointments with your healthcare providers  Visit your eye doctor as directed  Home safety tips:   · Add items to prevent falls in the bathroom  Put nonslip strips on your bath or shower floor to prevent you from slipping  Use a bath mat if you do not have carpet in the bathroom  This will prevent you from falling when you step out of the bath or shower  Use a shower seat so you do not need to stand while you shower  Sit on the toilet or a chair in your bathroom to dry yourself and put on clothing  This will prevent you from losing your balance from drying or dressing yourself while you are standing  · Keep paths clear  Remove books, shoes, and other objects from walkways and stairs  Place cords for telephones and lamps out of the way so that you do not need to walk over them  Tape them down if you cannot move them  Remove small rugs  If you cannot remove a rug, secure it with double-sided tape  This will prevent you from tripping  · Install bright lights in your home  Use night lights to help light paths to the bathroom or kitchen  Always turn on the light before you start walking  · Keep items you use often on shelves within reach  Do not use a step stool to help you reach an item  · Paint or place reflective tape on the edges of your stairs    This will help you see the stairs better  Follow up with your healthcare provider as directed:  Write down your questions so you remember to ask them during your visits  © 2017 [address] Information is for End User's use only and may not be sold, redistributed or otherwise used for commercial purposes  All illustrations and images included in CareNotes® are the copyrighted property of A D [employer] M , Inc  or Darrius Corbin  The above information is an  only  It is not intended as medical advice for individual conditions or treatments  Talk to your doctor, nurse or pharmacist before following any medical regimen to see if it is safe and effective for you  Advance Directives   WHAT YOU NEED TO KNOW:   What are advance directives? Advance directives are legal documents that state your wishes and plans for medical care  These plans are made ahead of time in case you lose your ability to make decisions for yourself  Advance directives can apply to any medical decision, such as the treatments you want, and if you want to donate organs  What are the types of advance directives? There are many types of advance directives, and each state has rules about how to use them  You may choose a combination of any of the following:  · Living will: This is a written record of the treatment you want  You can also choose which treatments you do not want, which to limit, and which to stop at a certain time  This includes surgery, medicine, IV fluid, and tube feedings  · Durable power of  for healthcare [facility]): This is a written record that states who you want to make healthcare choices for you when you are unable to make them for yourself  This person, called a proxy, is usually a family member or a friend  You may choose more than 1 proxy  · Do not resuscitate (DNR) order:  A DNR order is used in case your heart stops beating or you stop breathing   It is a request not to have certain forms of treatment, such as CPR  A DNR order may be included in other types of advance directives  · Medical directive: This covers the care that you want if you are in a coma, near death, or unable to make decisions for yourself  You can list the treatments you want for each condition  Treatment may include pain medicine, surgery, blood transfusions, dialysis, IV or tube feedings, and a ventilator (breathing machine)  · Values history: This document has questions about your views, beliefs, and how you feel and think about life  This information can help others choose the care that you would choose  Why are advance directives important? An advance directive helps you control your care  Although spoken wishes may be used, it is better to have your wishes written down  Spoken wishes can be misunderstood, or not followed  Treatments may be given even if you do not want them  An advance directive may make it easier for your family to make difficult choices about your care  How do I decide what to put in my advance directives? · Make informed decisions:  Make sure you fully understand treatments or care you may receive  Think about the benefits and problems your decisions could cause for you or your family  Talk to healthcare providers if you have concerns or questions before you write down your wishes  You may also want to talk with your Zoroastrianism or , or a   Check your state laws to make sure that what you put in your advance directive is legal      · Sign all forms:  Sign and date your advance directive when you have finished  You may also need 2 witnesses to sign the forms  Witnesses cannot be your doctor or his staff, your spouse, heirs or beneficiaries, people you owe money to, or your chosen proxy  Talk to your family, proxy, and healthcare providers about your advance directive  Give each person a copy, and keep one for yourself in a place you can get to easily   Do not keep it hidden or locked away  · Review and revise your plans: You can revise your advance directive at any time, as long as you are able to make decisions  Review your plan every year, and when there are changes in your life, or your health  When you make changes, let your family, proxy, and healthcare providers know  Give each a new copy  Where can I find more information? · [employer]  [address] , [address]  Phone: 5- 233 - [phone]  Phone: 2- 002 - [phone]  Web Address: http://www  aafp org  · [address] [facility])  [address] [facility], [address] [facility] , [address] [place] [address]  Phone: 9- [phone]  Phone: [phone]  Web Address: Alis mckeon  CARE AGREEMENT:   You have the right to help plan your care  To help with this plan, you must learn about your health condition and treatment options  You must also learn about advance directives and how they are used  Work with your healthcare providers to decide what care will be used to treat you  You always have the right to refuse treatment  The above information is an  only  It is not intended as medical advice for individual conditions or treatments  Talk to your doctor, nurse or pharmacist before following any medical regimen to see if it is safe and effective for you  © 2017 [address]  Information is for End User's use only and may not be sold, redistributed or otherwise used for commercial purposes  All illustrations and images included in CareNotes® are the copyrighted property of [employer] , [employer]  or Darrius Corbin  Thank you for enrolling in [address] [place] [address]  Please follow the instructions below to securely access your online medical record  [employer] allows you to send messages to your doctor, view your test results, renew your prescriptions, schedule appointments, and more      [address] uses Single Sign on (SSO) Technology for you to log in and access our [facility], including [email or web address]  No more remembering multiple user names and passwords! We are going to guide you through, step by step, to help you set up your [facility] account which will provide access to your [email or web address] account  How Do I Sign Up? 1  In your Internet browser, go to [email or web address]       2  Click on the [facility] patient account and then click Dont have an                 Account? Create one now      3  Enter your demographic information and chose a user name (email address) and password  Think of one that is secure and easy to remember  Enter a Referral code if you have one (this is not your MyChart code ) Accept the Terms and Conditions and the Privacy Policy  4  Select your security questions that you will use to reset your password should you forget it  Click Submit  5  Enter your [employer] Activation Code exactly as it appears below  You will not need to use this code after you have completed the sign-up process  If you do not sign up before the expiration date, you must request a new code  [employer] Activation Code: [email or web address]  Expires: 12/24/2018  1:28 PM    6  Confirm your email address  An email confirmation was sent to you  Please open that email and click Confirm your Email   You should then be redirected to our [employer] Single sign on page, where you will log on with the user name and password you created! Proceed to the [employer] Icon to view your personal health information  Additional Information  If you have questions, you can e-mail patient  [email or web address] or call [phone] to talk to our customer support staff  Remember, [employer] is NOT to be used for urgent needs  For medical emergencies, dial 911

## 2018-12-15 LAB
ALBUMIN SERPL-MCNC: 4.4 G/DL (ref 3.5–5.5)
ALBUMIN/GLOB SERPL: 1.8 {RATIO} (ref 1.2–2.2)
ALP SERPL-CCNC: 57 IU/L (ref 39–117)
ALT SERPL-CCNC: 29 IU/L (ref 0–32)
AST SERPL-CCNC: 23 IU/L (ref 0–40)
BILIRUB SERPL-MCNC: 0.4 MG/DL (ref 0–1.2)
BUN SERPL-MCNC: 14 MG/DL (ref 6–24)
BUN/CREAT SERPL: 16 (ref 9–23)
CALCIUM SERPL-MCNC: 9.3 MG/DL (ref 8.7–10.2)
CHLORIDE SERPL-SCNC: 102 MMOL/L (ref 96–106)
CHOLEST SERPL-MCNC: 244 MG/DL (ref 100–199)
CO2 SERPL-SCNC: 21 MMOL/L (ref 20–29)
CREAT SERPL-MCNC: 0.85 MG/DL (ref 0.57–1)
ERYTHROCYTE [DISTWIDTH] IN BLOOD BY AUTOMATED COUNT: 13 % (ref 12.3–15.4)
EST. AVERAGE GLUCOSE BLD GHB EST-MCNC: 126 MG/DL
GLOBULIN SER-MCNC: 2.4 G/DL (ref 1.5–4.5)
GLUCOSE SERPL-MCNC: 118 MG/DL (ref 65–99)
HBA1C MFR BLD: 6 % (ref 4.8–5.6)
HCT VFR BLD AUTO: 40.9 % (ref 34–46.6)
HDLC SERPL-MCNC: 36 MG/DL
HGB BLD-MCNC: 14.3 G/DL (ref 11.1–15.9)
LDLC SERPL CALC-MCNC: ABNORMAL MG/DL (ref 0–99)
LDLC SERPL DIRECT ASSAY-MCNC: 144 MG/DL (ref 0–99)
LDLC/HDLC SERPL: ABNORMAL RATIO
MCH RBC QN AUTO: 29 PG (ref 26.6–33)
MCHC RBC AUTO-ENTMCNC: 35 G/DL (ref 31.5–35.7)
MCV RBC AUTO: 83 FL (ref 79–97)
PLATELET # BLD AUTO: 354 X10E3/UL (ref 150–379)
POTASSIUM SERPL-SCNC: 4.5 MMOL/L (ref 3.5–5.2)
PROT SERPL-MCNC: 6.8 G/DL (ref 6–8.5)
RBC # BLD AUTO: 4.93 X10E6/UL (ref 3.77–5.28)
SL AMB EGFR AFRICAN AMERICAN: 91 ML/MIN/1.73
SL AMB EGFR NON AFRICAN AMERICAN: 79 ML/MIN/1.73
SL AMB VLDL CHOLESTEROL CALC: ABNORMAL MG/DL (ref 5–40)
SODIUM SERPL-SCNC: 137 MMOL/L (ref 134–144)
TRIGL SERPL-MCNC: 553 MG/DL (ref 0–149)
TSH SERPL DL<=0.005 MIU/L-ACNC: 3.88 UIU/ML (ref 0.45–4.5)
WBC # BLD AUTO: 5.1 X10E3/UL (ref 3.4–10.8)

## 2019-01-09 DIAGNOSIS — E78.1 ESSENTIAL HYPERTRIGLYCERIDEMIA: ICD-10-CM

## 2019-01-09 DIAGNOSIS — I10 BENIGN ESSENTIAL HYPERTENSION: ICD-10-CM

## 2019-01-10 RX ORDER — LABETALOL 100 MG/1
TABLET, FILM COATED ORAL
Qty: 60 TABLET | Refills: 5 | Status: SHIPPED | OUTPATIENT
Start: 2019-01-10 | End: 2019-01-16 | Stop reason: SDUPTHER

## 2019-01-10 RX ORDER — GEMFIBROZIL 600 MG/1
TABLET, FILM COATED ORAL
Qty: 60 TABLET | Refills: 5 | Status: SHIPPED | OUTPATIENT
Start: 2019-01-10 | End: 2019-09-05 | Stop reason: SDUPTHER

## 2019-01-16 ENCOUNTER — OFFICE VISIT (OUTPATIENT)
Dept: FAMILY MEDICINE CLINIC | Facility: CLINIC | Age: 54
End: 2019-01-16
Payer: MEDICARE

## 2019-01-16 VITALS
HEART RATE: 112 BPM | DIASTOLIC BLOOD PRESSURE: 80 MMHG | TEMPERATURE: 97.9 F | RESPIRATION RATE: 13 BRPM | BODY MASS INDEX: 37.79 KG/M2 | SYSTOLIC BLOOD PRESSURE: 130 MMHG | HEIGHT: 67 IN | WEIGHT: 240.8 LBS | OXYGEN SATURATION: 97 %

## 2019-01-16 DIAGNOSIS — E78.2 MIXED HYPERLIPIDEMIA: ICD-10-CM

## 2019-01-16 DIAGNOSIS — R73.01 IFG (IMPAIRED FASTING GLUCOSE): ICD-10-CM

## 2019-01-16 DIAGNOSIS — I10 BENIGN ESSENTIAL HYPERTENSION: Primary | ICD-10-CM

## 2019-01-16 DIAGNOSIS — K76.0 NONALCOHOLIC FATTY LIVER DISEASE: ICD-10-CM

## 2019-01-16 PROCEDURE — 99214 OFFICE O/P EST MOD 30 MIN: CPT | Performed by: FAMILY MEDICINE

## 2019-01-16 RX ORDER — EZETIMIBE 10 MG/1
10 TABLET ORAL DAILY
Qty: 30 TABLET | Refills: 5 | Status: SHIPPED | OUTPATIENT
Start: 2019-01-16 | End: 2019-06-04 | Stop reason: SINTOL

## 2019-01-16 NOTE — PROGRESS NOTES
Assessment/Plan:    Problem List Items Addressed This Visit     Hyperlipidemia     Add zetia  Taking lopid more regularly         Relevant Medications    ezetimibe (ZETIA) 10 mg tablet    Other Relevant Orders    Ambulatory referral to Nutrition Services    Comprehensive metabolic panel    Lipid Panel with Direct LDL reflex    TSH, 3rd generation with Free T4 reflex    Ambulatory referral to Weight Management    Benign essential hypertension - Primary     Stable and improve on labetol         Relevant Orders    Ambulatory referral to Weight Management    IFG (impaired fasting glucose)     bordeline sugars   Will recheck in 4 months         Relevant Orders    Ambulatory referral to Nutrition Services    Hemoglobin A1C    Ambulatory referral to Weight Management    Nonalcoholic fatty liver disease     Discussed weight loss         Relevant Orders    Ambulatory referral to Nutrition Services    BMI 37 0-37 9, adult     Start diet and exercise         Relevant Orders    Ambulatory referral to Weight Management          There are no Patient Instructions on file for this visit  No Follow-up on file  Subjective:      Patient ID: Master Chavez is a 47 y o  female  Chief Complaint   Patient presents with    Follow-up     Patient here for 1 month follow up on Hypertension        Here for follow up  Labs reviewed  Overall feeling good      Hypertension   This is a chronic problem  The current episode started more than 1 year ago  The problem is unchanged  The problem is controlled  Associated symptoms include anxiety  There are no associated agents to hypertension  Risk factors for coronary artery disease include dyslipidemia  Past treatments include beta blockers  The current treatment provides significant improvement  There are no compliance problems  Hyperlipidemia   This is a chronic problem  The current episode started more than 1 year ago  The problem is uncontrolled   Recent lipid tests were reviewed and are high  Exacerbating diseases include diabetes (pre-diabetes)  The current treatment provides mild improvement of lipids  The following portions of the patient's history were reviewed and updated as appropriate: allergies, current medications, past family history, past medical history, past social history, past surgical history and problem list     Review of Systems   Constitutional: Negative  HENT: Negative  Eyes: Negative  Respiratory: Negative  Current Outpatient Prescriptions   Medication Sig Dispense Refill    buPROPion (WELLBUTRIN SR) 200 MG 12 hr tablet Take 1 tablet by mouth daily      Calcium Carb-Cholecalciferol (CALCIUM 1000 + D PO) Take by mouth      clonazePAM (KlonoPIN) 0 5 mg tablet Take 1 tablet by mouth daily at bedtime      FLUoxetine (PROzac) 40 MG capsule Take 1 capsule by mouth daily      gemfibrozil (LOPID) 600 mg tablet TAKE 1 TABLET BY MOUTH TWICE A DAY 60 tablet 5    labetalol (NORMODYNE) 100 mg tablet TAKE 1 TABLET BY MOUTH EVERY 12 HOURS 60 tablet 5    Omega-3 Fatty Acids (OMEGA-3 CF PO) Take by mouth      Vitamin D, Ergocalciferol, 2000 units CAPS Take by mouth      ezetimibe (ZETIA) 10 mg tablet Take 1 tablet (10 mg total) by mouth daily 30 tablet 5     No current facility-administered medications for this visit  Objective:    /80   Pulse (!) 112   Temp 97 9 °F (36 6 °C)   Resp 13   Ht 5' 7 13" (1 705 m)   Wt 109 kg (240 lb 12 8 oz)   SpO2 97%   BMI 37 57 kg/m²        Physical Exam   Constitutional: She appears well-developed and well-nourished  Eyes: Pupils are equal, round, and reactive to light  Neck: Normal range of motion  Neck supple  Cardiovascular: Normal rate, regular rhythm, normal heart sounds and intact distal pulses  Pulmonary/Chest: Effort normal and breath sounds normal    Abdominal: Soft  Bowel sounds are normal    Musculoskeletal: Normal range of motion  Nursing note and vitals reviewed  Cleveland Chatman, DO

## 2019-02-04 ENCOUNTER — OFFICE VISIT (OUTPATIENT)
Dept: BARIATRICS | Facility: CLINIC | Age: 54
End: 2019-02-04
Payer: MEDICARE

## 2019-02-04 VITALS
HEIGHT: 67 IN | HEART RATE: 102 BPM | TEMPERATURE: 98.9 F | DIASTOLIC BLOOD PRESSURE: 76 MMHG | BODY MASS INDEX: 37.9 KG/M2 | RESPIRATION RATE: 16 BRPM | SYSTOLIC BLOOD PRESSURE: 120 MMHG | WEIGHT: 241.5 LBS

## 2019-02-04 DIAGNOSIS — F41.8 DEPRESSION WITH ANXIETY: ICD-10-CM

## 2019-02-04 DIAGNOSIS — I10 BENIGN ESSENTIAL HYPERTENSION: ICD-10-CM

## 2019-02-04 DIAGNOSIS — E78.2 MIXED HYPERLIPIDEMIA: ICD-10-CM

## 2019-02-04 DIAGNOSIS — R73.01 IFG (IMPAIRED FASTING GLUCOSE): ICD-10-CM

## 2019-02-04 DIAGNOSIS — K76.0 NONALCOHOLIC FATTY LIVER DISEASE: ICD-10-CM

## 2019-02-04 DIAGNOSIS — E66.01 SEVERE OBESITY (BMI 35.0-39.9) WITH COMORBIDITY (HCC): Primary | ICD-10-CM

## 2019-02-04 PROCEDURE — 99204 OFFICE O/P NEW MOD 45 MIN: CPT | Performed by: PHYSICIAN ASSISTANT

## 2019-02-04 NOTE — ASSESSMENT & PLAN NOTE
-Discussed options of HealthyCORE-Intensive Lifestyle Intervention Program, Very Low Calorie Diet-VLCD, Conservative Program, My-En-Y Gastric Bypass and Vertical Sleeve Gastrectomy and the role of weight loss medications   -Initial weight loss goal of 5-10% weight loss for improved health  -Screening labs: reviewed  -Patient is interested in pursuing bariatric surgery vs conservative program  Given information to attend surgical seminar    +STOP BANG (6/8):  -reviewed risks of undiagnosed/untreated sleep apnea  -Recommended referral to sleep medicine provider for further evaluation

## 2019-02-04 NOTE — PROGRESS NOTES
Assessment/Plan:    Severe obesity (BMI 35 0-39  9) with comorbidity ([address] )  -Discussed options of HealthyCORE-Intensive Lifestyle Intervention Program, Very Low Calorie Diet-VLCD, Conservative Program, My-En-Y Gastric Bypass and Vertical Sleeve Gastrectomy and the role of weight loss medications   -Initial weight loss goal of 5-10% weight loss for improved health  -Screening labs: reviewed  -Patient is interested in pursuing bariatric surgery vs conservative program  Given information to attend surgical seminar    +STOP BANG (6/8):  -reviewed risks of undiagnosed/untreated sleep apnea  -Recommended referral to sleep medicine provider for further evaluation  Benign essential hypertension  -on labetolol  -may improve with weight loss    Nonalcoholic fatty liver disease  -recommend a minimum of 5-10% weight loss    IFG (impaired fasting glucose)  -hgbA1c 6 0  -recommend avoidance of refined carbohydrates  -increase cardiovascular exercise as tolerated    Hyperlipidemia  -on Lopid and Zetia  -dietary/lifestyle changes  -weight loss may improve this condition    Depression with anxiety  -managed by psychiatry  -reports overall feeling more down at this time, struggles with social anxiety and lack of motivation  -Bipolar disorder listed as Dx, patient denies this  -encouraged ongoing follow up with psych    Goals:    Food log (ie ) www myfitnesspal com,sparkpeople  com,loseit com,calorieking  com,etc    No sugary beverages  At least 64oz of water daily  Increase physical activity by 10 minutes daily  Gradually increase physical activity to a goal of 5 days per week for 30 minutes of MODERATE intensity PLUS 2 days per week of FULL BODY resistance training  1608-5282 calories per day  5-10 servings of fruits and vegetables per day      Follow up in approximately 1 month with Non-Surgical Physician/Advanced Practitioner  Diagnoses and all orders for this visit:    Severe obesity (BMI 35 0-39  9) with comorbidity [facility])  -     Ambulatory referral to Sleep Medicine; Future    Benign essential hypertension  -     Ambulatory referral to Weight Management    IFG (impaired fasting glucose)  -     Ambulatory referral to Weight Management    Mixed hyperlipidemia  -     Ambulatory referral to Weight Management    BMI 37 0-37 9, adult  -     Ambulatory referral to Weight Management    Nonalcoholic fatty liver disease    Depression with anxiety          Subjective:   Chief Complaint   Patient presents with    Consult     Pt is here for MWM consult  Patient ID: Reshma Wharton  is a 47 y o  female with excess weight/obesity here to pursue weight managment      Past Medical History:   Diagnosis Date    Anxiety     Bipolar disorder ([address] )     Depression     Hyperlipidemia     Hypertension     Lyme disease     Psychiatric disorder     Shortness of breath on exertion     Vitamin D deficiency        HPI:  Obesity/Excess Weight:  Severity: Severe  Onset:  Mid to late 30's   Modifiers: Diet and Exercise  Contributing factors: reports is hard for her to take care of herself, procrastination, reports she is in a habit of treating herself badly  Associated symptoms: feels very self conscious, donesn't like to go out in public    Goals:  107-933 lbs     B: Coffee + creamer + yogurt/granola or egg sandwich from [facility]  S: granola bar or cheese/crackers  L: turkey or hamsandwich or PBJ sandwich   S: yogurt or another sandwich  D: meatloaf + mashed potatoes or cheese ravioli   S: varies - cheese/crackers or tortillas/salsa or hummus  OR trailmix    Hydration: 32-48oz water, 12-24 oz coffee + creamer or milk + occasional diet coke  Formal exercise: no recent exercise    The following portions of the patient's history were reviewed and updated as appropriate: allergies, current medications, past family history, past medical history, past social history, past surgical history and problem list     Review of Systems   Constitutional: Negative for chills and fever  HENT: Negative for sore throat  Respiratory: Positive for shortness of breath  Negative for cough  Chest tightness: with stairs or really hot/cold  Cardiovascular: Negative for chest pain  Gastrointestinal: Positive for abdominal pain (on and off - feels crampy, recommend follow up with gyn) and constipation  Negative for diarrhea, nausea and vomiting  Genitourinary: Negative for dysuria  Musculoskeletal: Positive for arthralgias  Skin: Negative for rash  Neurological: Positive for dizziness and headaches  Psychiatric/Behavioral:        Reports moderate depression - denies SI/HI  Follows with psychiatry       Objective:    /76 (BP Location: Left arm, Patient Position: Sitting, Cuff Size: Large)   Pulse 102   Temp 98 9 °F (37 2 °C) (Tympanic)   Resp 16   Ht 5' 7" (1 702 m)   Wt 110 kg (241 lb 8 oz)   BMI 37 82 kg/m²     Physical Exam   Nursing note and vitals reviewed  Constitutional   General appearance: Abnormal   well developed and obese  Eyes No conjunctival pallor  Ears, Nose, Mouth, and Throat Oral mucosa moist    Pulmonary   Respiratory effort: No increased work of breathing or signs of respiratory distress  Auscultation of lungs: Clear to auscultation, equal breath sounds bilaterally, no wheezes, no rales, no rhonci  Cardiovascular   Auscultation of heart: Normal rate and rhythm, normal S1 and S2, without murmurs  Examination of extremities for edema and/or varicosities: Normal   no edema  Abdomen   Abdomen: Abnormal   The abdomen was obese  Bowel sounds were normal  The abdomen was soft and nontender     Musculoskeletal   Gait and station: Normal     Psychiatric   Orientation to person, place and time: Normal     Affect: appropriate

## 2019-02-04 NOTE — ASSESSMENT & PLAN NOTE
-hgbA1c 6 0  -recommend avoidance of refined carbohydrates  -increase cardiovascular exercise as tolerated

## 2019-02-04 NOTE — LETTER
February 5, 2019     Anjali Hurt, [address] [facility]  [address]  [address] [place] [address]    Patient: Valeri Rivera   YOB: 1965   Date of Visit: 2/4/2019       Dear Dr Woodall Case:    Thank you for referring Valeri Rivera to me for evaluation  Below are my notes for this consultation  If you have questions, please do not hesitate to call me  I look forward to following your patient along with you  Sincerely,        Nicole Nunes PA-C        CC: No Recipients  Nicole Nunes PA-C  2/5/2019  7:10 AM  Sign at close encounter  Assessment/Plan:    Severe obesity (BMI 35 0-39  9) with comorbidity ([address] )  -Discussed options of HealthyCORE-Intensive Lifestyle Intervention Program, Very Low Calorie Diet-VLCD, Conservative Program, My-En-Y Gastric Bypass and Vertical Sleeve Gastrectomy and the role of weight loss medications   -Initial weight loss goal of 5-10% weight loss for improved health  -Screening labs: reviewed  -Patient is interested in pursuing bariatric surgery vs conservative program  Given information to attend surgical seminar    +STOP BANG (6/8):  -reviewed risks of undiagnosed/untreated sleep apnea  -Recommended referral to sleep medicine provider for further evaluation      Benign essential hypertension  -on labetolol  -may improve with weight loss    Nonalcoholic fatty liver disease  -recommend a minimum of 5-10% weight loss    IFG (impaired fasting glucose)  -hgbA1c 6 0  -recommend avoidance of refined carbohydrates  -increase cardiovascular exercise as tolerated    Hyperlipidemia  -on Lopid and Zetia  -dietary/lifestyle changes  -weight loss may improve this condition    Depression with anxiety  -managed by psychiatry  -reports overall feeling more down at this time, struggles with social anxiety and lack of motivation  -Bipolar disorder listed as Dx, patient denies this  -encouraged ongoing follow up with psych    Goals:    Food log (ie ) www myfitnesspal com,sparkpeople  com,loseit com,calorieking  com,etc    No sugary beverages  At least 64oz of water daily  Increase physical activity by 10 minutes daily  Gradually increase physical activity to a goal of 5 days per week for 30 minutes of MODERATE intensity PLUS 2 days per week of FULL BODY resistance training  7045-6167 calories per day  5-10 servings of fruits and vegetables per day      Follow up in approximately 1 month with Non-Surgical Physician/Advanced Practitioner  Diagnoses and all orders for this visit:    Severe obesity (BMI 35 0-39  9) with comorbidity ([facility] )  -     Ambulatory referral to Sleep Medicine; Future    Benign essential hypertension  -     Ambulatory referral to Weight Management    IFG (impaired fasting glucose)  -     Ambulatory referral to Weight Management    Mixed hyperlipidemia  -     Ambulatory referral to Weight Management    BMI 37 0-37 9, adult  -     Ambulatory referral to Weight Management    Nonalcoholic fatty liver disease    Depression with anxiety          Subjective:   Chief Complaint   Patient presents with    Consult     Pt is here for MWM consult  Patient ID: Yanci Dalal  is a 47 y o  female with excess weight/obesity here to pursue weight managment      Past Medical History:   Diagnosis Date    Anxiety     Bipolar disorder ([facility] )     Depression     Hyperlipidemia     Hypertension     Lyme disease     Psychiatric disorder     Shortness of breath on exertion     Vitamin D deficiency        HPI:  Obesity/Excess Weight:  Severity: Severe  Onset:  Mid to late 30's   Modifiers: Diet and Exercise  Contributing factors: reports is hard for her to take care of herself, procrastination, reports she is in a habit of treating herself badly  Associated symptoms: feels very self conscious, donesn't like to go out in public    Goals:  480-933 lbs     B: Coffee + creamer + yogurt/granola or egg sandwich from [facility]  S: granola bar or cheese/crackers  L: turkey or hamsandwich or PBJ sandwich   S: yogurt or another sandwich  D: meatloaf + mashed potatoes or cheese ravioli   S: varies - cheese/crackers or tortillas/salsa or hummus  OR trailmix    Hydration: 32-48oz water, 12-24 oz coffee + creamer or milk + occasional diet coke  Formal exercise: no recent exercise    The following portions of the patient's history were reviewed and updated as appropriate: allergies, current medications, past family history, past medical history, past social history, past surgical history and problem list     Review of Systems   Constitutional: Negative for chills and fever  HENT: Negative for sore throat  Respiratory: Positive for shortness of breath  Negative for cough  Chest tightness: with stairs or really hot/cold  Cardiovascular: Negative for chest pain  Gastrointestinal: Positive for abdominal pain (on and off - feels crampy, recommend follow up with gyn) and constipation  Negative for diarrhea, nausea and vomiting  Genitourinary: Negative for dysuria  Musculoskeletal: Positive for arthralgias  Skin: Negative for rash  Neurological: Positive for dizziness and headaches  Psychiatric/Behavioral:        Reports moderate depression - denies SI/HI  Follows with psychiatry       Objective:    /76 (BP Location: Left arm, Patient Position: Sitting, Cuff Size: Large)   Pulse 102   Temp 98 9 °F (37 2 °C) (Tympanic)   Resp 16   Ht 5' 7" (1 702 m)   Wt 110 kg (241 lb 8 oz)   BMI 37 82 kg/m²      Physical Exam   Nursing note and vitals reviewed  Constitutional   General appearance: Abnormal   well developed and obese  Eyes No conjunctival pallor  Ears, Nose, Mouth, and Throat Oral mucosa moist    Pulmonary   Respiratory effort: No increased work of breathing or signs of respiratory distress  Auscultation of lungs: Clear to auscultation, equal breath sounds bilaterally, no wheezes, no rales, no rhonci      Cardiovascular Auscultation of heart: Normal rate and rhythm, normal S1 and S2, without murmurs  Examination of extremities for edema and/or varicosities: Normal   no edema  Abdomen   Abdomen: Abnormal   The abdomen was obese  Bowel sounds were normal  The abdomen was soft and nontender     Musculoskeletal   Gait and station: Normal     Psychiatric   Orientation to person, place and time: Normal     Affect: appropriate

## 2019-02-05 NOTE — ASSESSMENT & PLAN NOTE
-managed by psychiatry  -reports overall feeling more down at this time, struggles with social anxiety and lack of motivation  -Bipolar disorder listed as Dx, patient denies this  -encouraged ongoing follow up with psych

## 2019-03-05 ENCOUNTER — TELEPHONE (OUTPATIENT)
Dept: FAMILY MEDICINE CLINIC | Facility: CLINIC | Age: 54
End: 2019-03-05

## 2019-03-05 NOTE — TELEPHONE ENCOUNTER
LMOM for patient to confirm if tomorrow with Dr Radha Byers at 11:15 AM (appt at 11:30) works for patient

## 2019-03-05 NOTE — TELEPHONE ENCOUNTER
Patient is out of town today but she called to make an appointment tomorrow for mainly her right ear pain  She says that it is a possible ear infection  Please advise if she may be seen by you or another provider  If by you, please advise where I may schedule? Thank you

## 2019-03-06 ENCOUNTER — OFFICE VISIT (OUTPATIENT)
Dept: FAMILY MEDICINE CLINIC | Facility: CLINIC | Age: 54
End: 2019-03-06
Payer: MEDICARE

## 2019-03-06 VITALS
SYSTOLIC BLOOD PRESSURE: 120 MMHG | BODY MASS INDEX: 36.85 KG/M2 | HEIGHT: 67 IN | DIASTOLIC BLOOD PRESSURE: 80 MMHG | TEMPERATURE: 98 F | HEART RATE: 127 BPM | WEIGHT: 234.8 LBS | OXYGEN SATURATION: 98 % | RESPIRATION RATE: 16 BRPM

## 2019-03-06 DIAGNOSIS — H60.501 ACUTE OTITIS EXTERNA OF RIGHT EAR, UNSPECIFIED TYPE: ICD-10-CM

## 2019-03-06 DIAGNOSIS — H65.04 RECURRENT ACUTE SEROUS OTITIS MEDIA OF RIGHT EAR: Primary | ICD-10-CM

## 2019-03-06 PROCEDURE — 99213 OFFICE O/P EST LOW 20 MIN: CPT | Performed by: FAMILY MEDICINE

## 2019-03-06 RX ORDER — AZITHROMYCIN 250 MG/1
TABLET, FILM COATED ORAL
Qty: 6 TABLET | Refills: 0 | Status: SHIPPED | OUTPATIENT
Start: 2019-03-06 | End: 2019-03-11

## 2019-03-06 RX ORDER — NEOMYCIN SULFATE, POLYMYXIN B SULFATE AND HYDROCORTISONE 10; 3.5; 1 MG/ML; MG/ML; [USP'U]/ML
4 SUSPENSION/ DROPS AURICULAR (OTIC) 4 TIMES DAILY
Qty: 10 ML | Refills: 0 | Status: SHIPPED | OUTPATIENT
Start: 2019-03-06 | End: 2020-02-17 | Stop reason: ALTCHOICE

## 2019-03-06 NOTE — PROGRESS NOTES
Assessment/Plan:    No problem-specific Assessment & Plan notes found for this encounter  Diagnoses and all orders for this visit:    Recurrent acute serous otitis media of right ear  Comments:  Pt stable on exam   Treating with Azithromycin, Cortisporin Otic drops, OTC Cough/Cold Preps PRN, rest, and good PO hydration  Orders:  -     azithromycin (ZITHROMAX) 250 mg tablet; Take 2 tablets by mouth on day #1, then 1 tab daily for four more days  Acute otitis externa of right ear, unspecified type  -     neomycin-polymyxin-hydrocortisone (CORTISPORIN) 0 35%-10,000 units/mL-1% otic suspension; Administer 4 drops to the right ear 4 (four) times a day for 7 days          Subjective:      Patient ID: Lois Callaway is a 49 y o  female  Right ear pain x 2 weeks  Mild stuffy nose  The following portions of the patient's history were reviewed and updated as appropriate: allergies, current medications, past family history, past social history, past surgical history and problem list     Past Medical History:   Diagnosis Date    Anxiety     [facility] )     Depression     Hyperlipidemia     Hypertension     Lyme disease     Psychiatric disorder     Shortness of breath on exertion     Vitamin D deficiency        Current Outpatient Medications:     azithromycin (ZITHROMAX) 250 mg tablet, Take 2 tablets by mouth on day #1, then 1 tab daily for four more days  , Disp: 6 tablet, Rfl: 0    buPROPion (WELLBUTRIN SR) 200 MG 12 hr tablet, Take 1 tablet by mouth daily, Disp: , Rfl:     Calcium Carb-Cholecalciferol (CALCIUM 1000 + D PO), Take by mouth, Disp: , Rfl:     clonazePAM (KlonoPIN) 0 5 mg tablet, Take 1 tablet by mouth daily at bedtime, Disp: , Rfl:     ezetimibe (ZETIA) 10 mg tablet, Take 1 tablet (10 mg total) by mouth daily, Disp: 30 tablet, Rfl: 5    FLUoxetine (PROzac) 40 MG capsule, Take 1 capsule by mouth daily, Disp: , Rfl:     gemfibrozil (LOPID) 600 mg tablet, TAKE 1 TABLET BY MOUTH TWICE A DAY, Disp: 60 tablet, Rfl: 5    labetalol (NORMODYNE) 100 mg tablet, TAKE 1 TABLET BY MOUTH EVERY 12 HOURS, Disp: 60 tablet, Rfl: 5    neomycin-polymyxin-hydrocortisone (CORTISPORIN) 0 35%-10,000 units/mL-1% otic suspension, Administer 4 drops to the right ear 4 (four) times a day for 7 days, Disp: 10 mL, Rfl: 0    Omega-3 Fatty Acids (OMEGA-3 CF PO), Take by mouth, Disp: , Rfl:     Vitamin D, Ergocalciferol, 2000 units CAPS, Take by mouth, Disp: , Rfl:     Allergies   Allergen Reactions    Atorvastatin Confusion    Penicillins Swelling and Rash     Social History     Tobacco Use    Smoking status: Former Smoker     Last attempt to quit: 2017     Years since quittin 2    Smokeless tobacco: Never Used   Substance Use Topics    Alcohol use: Yes     Comment: rare, few times per year    Drug use: No         Review of Systems   Constitutional: Negative for activity change and fever  HENT: Positive for congestion, ear pain and rhinorrhea  Respiratory: Negative for cough  Objective:      /80 (BP Location: Left arm, Patient Position: Sitting, Cuff Size: Standard)   Pulse (!) 127   Temp 98 °F (36 7 °C) (Tympanic)   Resp 16   Ht 5' 7" (1 702 m)   Wt 107 kg (234 lb 12 8 oz)   SpO2 98%   BMI 36 77 kg/m²          Physical Exam   Constitutional: She is oriented to person, place, and time  She appears well-developed and well-nourished  No distress  HENT:   Head: Normocephalic and atraumatic  Right Ear: Hearing and external ear normal  Tympanic membrane is bulging  Left Ear: Hearing, tympanic membrane, external ear and ear canal normal    Nose: Mucosal edema present  Mouth/Throat: Oropharynx is clear and moist  No oropharyngeal exudate  Serous effusion behind the right TM; mild irritation of the ear canal - no erythema  Eyes: Conjunctivae are normal    Neck: Normal range of motion  Neck supple  No thyromegaly present     Cardiovascular: Normal rate, regular rhythm and normal heart sounds  Exam reveals no gallop and no friction rub  No murmur heard  Pulmonary/Chest: Effort normal and breath sounds normal  No stridor  No respiratory distress  She has no wheezes  She has no rales  Lymphadenopathy:     She has no cervical adenopathy  Neurological: She is alert and oriented to person, place, and time  Skin: She is not diaphoretic  Psychiatric: She has a normal mood and affect  Her behavior is normal  Judgment and thought content normal    Nursing note and vitals reviewed

## 2019-04-02 LAB
ALBUMIN SERPL-MCNC: 4.4 G/DL (ref 3.5–5.5)
ALBUMIN/GLOB SERPL: 1.8 {RATIO} (ref 1.2–2.2)
ALP SERPL-CCNC: 53 IU/L (ref 39–117)
ALT SERPL-CCNC: 33 IU/L (ref 0–32)
AST SERPL-CCNC: 22 IU/L (ref 0–40)
BILIRUB SERPL-MCNC: 0.3 MG/DL (ref 0–1.2)
BUN SERPL-MCNC: 16 MG/DL (ref 6–24)
BUN/CREAT SERPL: 17 (ref 9–23)
CALCIUM SERPL-MCNC: 9.8 MG/DL (ref 8.7–10.2)
CHLORIDE SERPL-SCNC: 106 MMOL/L (ref 96–106)
CHOLEST SERPL-MCNC: 206 MG/DL (ref 100–199)
CO2 SERPL-SCNC: 18 MMOL/L (ref 20–29)
CREAT SERPL-MCNC: 0.94 MG/DL (ref 0.57–1)
EST. AVERAGE GLUCOSE BLD GHB EST-MCNC: 114 MG/DL
GLOBULIN SER-MCNC: 2.5 G/DL (ref 1.5–4.5)
GLUCOSE SERPL-MCNC: 110 MG/DL (ref 65–99)
HBA1C MFR BLD: 5.6 % (ref 4.8–5.6)
HDLC SERPL-MCNC: 41 MG/DL
LDLC SERPL CALC-MCNC: 109 MG/DL (ref 0–99)
LDLC/HDLC SERPL: 2.7 RATIO (ref 0–3.2)
POTASSIUM SERPL-SCNC: 4.6 MMOL/L (ref 3.5–5.2)
PROT SERPL-MCNC: 6.9 G/DL (ref 6–8.5)
SL AMB EGFR AFRICAN AMERICAN: 80 ML/MIN/1.73
SL AMB EGFR NON AFRICAN AMERICAN: 69 ML/MIN/1.73
SL AMB VLDL CHOLESTEROL CALC: 56 MG/DL (ref 5–40)
SODIUM SERPL-SCNC: 140 MMOL/L (ref 134–144)
TRIGL SERPL-MCNC: 281 MG/DL (ref 0–149)
TSH SERPL DL<=0.005 MIU/L-ACNC: 2.49 UIU/ML (ref 0.45–4.5)

## 2019-04-05 PROCEDURE — 87624 HPV HI-RISK TYP POOLED RSLT: CPT | Performed by: OBSTETRICS & GYNECOLOGY

## 2019-04-05 PROCEDURE — G0145 SCR C/V CYTO,THINLAYER,RESCR: HCPCS | Performed by: OBSTETRICS & GYNECOLOGY

## 2019-04-08 ENCOUNTER — LAB REQUISITION (OUTPATIENT)
Dept: LAB | Facility: HOSPITAL | Age: 54
End: 2019-04-08
Payer: MEDICARE

## 2019-04-08 DIAGNOSIS — Z01.419 ENCOUNTER FOR GYNECOLOGICAL EXAMINATION WITHOUT ABNORMAL FINDING: ICD-10-CM

## 2019-04-10 LAB
HPV HR 12 DNA CVX QL NAA+PROBE: NEGATIVE
HPV16 DNA CVX QL NAA+PROBE: NEGATIVE
HPV18 DNA CVX QL NAA+PROBE: NEGATIVE

## 2019-04-13 LAB
LAB AP GYN PRIMARY INTERPRETATION: NORMAL
Lab: NORMAL

## 2019-04-15 ENCOUNTER — HOSPITAL ENCOUNTER (OUTPATIENT)
Dept: MAMMOGRAPHY | Facility: HOSPITAL | Age: 54
Discharge: HOME | End: 2019-04-15
Payer: MEDICARE

## 2019-04-15 VITALS — WEIGHT: 234 LBS | BODY MASS INDEX: 36.73 KG/M2 | HEIGHT: 67 IN

## 2019-04-15 DIAGNOSIS — Z12.39 SCREENING FOR MALIGNANT NEOPLASM OF BREAST: ICD-10-CM

## 2019-04-15 PROCEDURE — 77067 SCR MAMMO BI INCL CAD: CPT

## 2019-04-23 ENCOUNTER — TELEPHONE (OUTPATIENT)
Dept: FAMILY MEDICINE CLINIC | Facility: CLINIC | Age: 54
End: 2019-04-23

## 2019-04-23 DIAGNOSIS — I10 BENIGN ESSENTIAL HYPERTENSION: ICD-10-CM

## 2019-04-23 DIAGNOSIS — E78.1 ESSENTIAL HYPERTRIGLYCERIDEMIA: ICD-10-CM

## 2019-04-23 RX ORDER — LABETALOL 100 MG/1
100 TABLET, FILM COATED ORAL EVERY 12 HOURS
Qty: 60 TABLET | Refills: 5 | Status: CANCELLED | OUTPATIENT
Start: 2019-04-23

## 2019-04-23 RX ORDER — GEMFIBROZIL 600 MG/1
600 TABLET, FILM COATED ORAL 2 TIMES DAILY
Qty: 60 TABLET | Refills: 5 | Status: CANCELLED | OUTPATIENT
Start: 2019-04-23

## 2019-06-04 ENCOUNTER — OFFICE VISIT (OUTPATIENT)
Dept: FAMILY MEDICINE CLINIC | Facility: CLINIC | Age: 54
End: 2019-06-04
Payer: MEDICARE

## 2019-06-04 VITALS
RESPIRATION RATE: 18 BRPM | OXYGEN SATURATION: 97 % | BODY MASS INDEX: 37.07 KG/M2 | SYSTOLIC BLOOD PRESSURE: 130 MMHG | DIASTOLIC BLOOD PRESSURE: 80 MMHG | HEIGHT: 67 IN | WEIGHT: 236.2 LBS | HEART RATE: 113 BPM | TEMPERATURE: 97.1 F

## 2019-06-04 DIAGNOSIS — F30.9 BIPOLAR I DISORDER, SINGLE MANIC EPISODE (HCC): ICD-10-CM

## 2019-06-04 DIAGNOSIS — R73.01 IFG (IMPAIRED FASTING GLUCOSE): ICD-10-CM

## 2019-06-04 DIAGNOSIS — E78.2 MIXED HYPERLIPIDEMIA: Primary | ICD-10-CM

## 2019-06-04 DIAGNOSIS — I10 BENIGN ESSENTIAL HYPERTENSION: ICD-10-CM

## 2019-06-04 PROBLEM — F41.1 ANXIETY STATE: Status: ACTIVE | Noted: 2018-01-15

## 2019-06-04 PROCEDURE — 99214 OFFICE O/P EST MOD 30 MIN: CPT | Performed by: FAMILY MEDICINE

## 2019-06-04 RX ORDER — DIMENHYDRINATE 50 MG
TABLET ORAL 2 TIMES DAILY
COMMUNITY
End: 2020-02-10 | Stop reason: ALTCHOICE

## 2019-06-24 ENCOUNTER — APPOINTMENT (OUTPATIENT)
Dept: LAB | Facility: AMBULARY SURGERY CENTER | Age: 54
End: 2019-06-24
Payer: MEDICARE

## 2019-06-24 ENCOUNTER — TRANSCRIBE ORDERS (OUTPATIENT)
Dept: LAB | Facility: AMBULARY SURGERY CENTER | Age: 54
End: 2019-06-24

## 2019-06-24 DIAGNOSIS — Z79.899 ENCOUNTER FOR LONG-TERM (CURRENT) USE OF OTHER MEDICATIONS: ICD-10-CM

## 2019-06-24 DIAGNOSIS — Z51.81 ENCOUNTER FOR THERAPEUTIC DRUG MONITORING: Primary | ICD-10-CM

## 2019-06-24 DIAGNOSIS — Z51.81 ENCOUNTER FOR THERAPEUTIC DRUG MONITORING: ICD-10-CM

## 2019-06-24 LAB
25(OH)D3 SERPL-MCNC: 22.3 NG/ML (ref 30–100)
VIT B12 SERPL-MCNC: 681 PG/ML (ref 100–900)

## 2019-06-24 PROCEDURE — 82306 VITAMIN D 25 HYDROXY: CPT

## 2019-06-24 PROCEDURE — G0480 DRUG TEST DEF 1-7 CLASSES: HCPCS

## 2019-06-24 PROCEDURE — 82607 VITAMIN B-12: CPT

## 2019-06-24 PROCEDURE — 36415 COLL VENOUS BLD VENIPUNCTURE: CPT

## 2019-06-27 LAB — MISCELLANEOUS LAB TEST RESULT: NORMAL

## 2019-07-12 ENCOUNTER — TELEPHONE (OUTPATIENT)
Dept: FAMILY MEDICINE CLINIC | Facility: CLINIC | Age: 54
End: 2019-07-12

## 2019-07-12 NOTE — TELEPHONE ENCOUNTER
Patient called and wants to know your recommendation for a vitamin D supplement  Is 5000 units too high?

## 2019-09-05 DIAGNOSIS — E78.1 ESSENTIAL HYPERTRIGLYCERIDEMIA: ICD-10-CM

## 2019-09-05 DIAGNOSIS — I10 BENIGN ESSENTIAL HYPERTENSION: ICD-10-CM

## 2019-09-05 RX ORDER — GEMFIBROZIL 600 MG/1
TABLET, FILM COATED ORAL
Qty: 60 TABLET | Refills: 3 | Status: SHIPPED | OUTPATIENT
Start: 2019-09-05 | End: 2020-01-23

## 2019-09-05 RX ORDER — LABETALOL 100 MG/1
TABLET, FILM COATED ORAL
Qty: 60 TABLET | Refills: 3 | Status: SHIPPED | OUTPATIENT
Start: 2019-09-05 | End: 2020-01-23

## 2019-12-05 ENCOUNTER — TELEPHONE (OUTPATIENT)
Dept: FAMILY MEDICINE CLINIC | Facility: CLINIC | Age: 54
End: 2019-12-05

## 2019-12-05 NOTE — TELEPHONE ENCOUNTER
Left message to call back  She is scheduled for AWV today but is too soon  She needs to be scheduled after 12/10  Plus she did not have Blood work done

## 2020-01-23 DIAGNOSIS — E78.1 ESSENTIAL HYPERTRIGLYCERIDEMIA: ICD-10-CM

## 2020-01-23 DIAGNOSIS — I10 BENIGN ESSENTIAL HYPERTENSION: ICD-10-CM

## 2020-01-23 RX ORDER — LABETALOL 100 MG/1
TABLET, FILM COATED ORAL
Qty: 60 TABLET | Refills: 0 | Status: SHIPPED | OUTPATIENT
Start: 2020-01-23 | End: 2020-02-10 | Stop reason: SDUPTHER

## 2020-01-23 RX ORDER — GEMFIBROZIL 600 MG/1
TABLET, FILM COATED ORAL
Qty: 60 TABLET | Refills: 0 | Status: SHIPPED | OUTPATIENT
Start: 2020-01-23 | End: 2020-02-10 | Stop reason: SDUPTHER

## 2020-02-10 ENCOUNTER — OFFICE VISIT (OUTPATIENT)
Dept: FAMILY MEDICINE CLINIC | Facility: CLINIC | Age: 55
End: 2020-02-10
Payer: MEDICARE

## 2020-02-10 VITALS
WEIGHT: 231.2 LBS | TEMPERATURE: 97.3 F | DIASTOLIC BLOOD PRESSURE: 90 MMHG | BODY MASS INDEX: 36.29 KG/M2 | HEIGHT: 67 IN | HEART RATE: 98 BPM | OXYGEN SATURATION: 96 % | SYSTOLIC BLOOD PRESSURE: 130 MMHG | RESPIRATION RATE: 18 BRPM

## 2020-02-10 DIAGNOSIS — I10 BENIGN ESSENTIAL HYPERTENSION: ICD-10-CM

## 2020-02-10 DIAGNOSIS — H66.92 OTITIS OF LEFT EAR: Primary | ICD-10-CM

## 2020-02-10 DIAGNOSIS — E78.1 ESSENTIAL HYPERTRIGLYCERIDEMIA: ICD-10-CM

## 2020-02-10 DIAGNOSIS — E66.01 SEVERE OBESITY (BMI 35.0-39.9) WITH COMORBIDITY (HCC): ICD-10-CM

## 2020-02-10 DIAGNOSIS — E78.2 MIXED HYPERLIPIDEMIA: ICD-10-CM

## 2020-02-10 DIAGNOSIS — F30.9 BIPOLAR I DISORDER, SINGLE MANIC EPISODE (HCC): ICD-10-CM

## 2020-02-10 PROCEDURE — 3075F SYST BP GE 130 - 139MM HG: CPT | Performed by: FAMILY MEDICINE

## 2020-02-10 PROCEDURE — 99214 OFFICE O/P EST MOD 30 MIN: CPT | Performed by: FAMILY MEDICINE

## 2020-02-10 PROCEDURE — 3080F DIAST BP >= 90 MM HG: CPT | Performed by: FAMILY MEDICINE

## 2020-02-10 PROCEDURE — 3008F BODY MASS INDEX DOCD: CPT | Performed by: FAMILY MEDICINE

## 2020-02-10 PROCEDURE — 1036F TOBACCO NON-USER: CPT | Performed by: FAMILY MEDICINE

## 2020-02-10 RX ORDER — LABETALOL 100 MG/1
100 TABLET, FILM COATED ORAL EVERY 12 HOURS
Qty: 60 TABLET | Refills: 5 | Status: SHIPPED | OUTPATIENT
Start: 2020-02-10 | End: 2020-08-10

## 2020-02-10 RX ORDER — SULFAMETHOXAZOLE AND TRIMETHOPRIM 800; 160 MG/1; MG/1
1 TABLET ORAL EVERY 12 HOURS SCHEDULED
Qty: 20 TABLET | Refills: 0 | Status: SHIPPED | OUTPATIENT
Start: 2020-02-10 | End: 2020-02-20

## 2020-02-10 RX ORDER — ANTIOX #8/OM3/DHA/EPA/LUT/ZEAX 250-2.5 MG
CAPSULE ORAL
COMMUNITY
End: 2021-06-03

## 2020-02-10 RX ORDER — GEMFIBROZIL 600 MG/1
600 TABLET, FILM COATED ORAL 2 TIMES DAILY
Qty: 60 TABLET | Refills: 5 | Status: SHIPPED | OUTPATIENT
Start: 2020-02-10 | End: 2020-08-10

## 2020-02-10 NOTE — PROGRESS NOTES
Assessment/Plan:    1  Otitis of left ear  -     sulfamethoxazole-trimethoprim (BACTRIM DS) 800-160 mg per tablet; Take 1 tablet by mouth every 12 (twelve) hours for 10 days    2  Bipolar I disorder, single manic episode [facility])  Assessment & Plan:  Seeing psych and stable      3  Severe obesity (BMI 35 0-39  9) with comorbidity ([address] )  Assessment & Plan:  Decreased po intake due to nausea      4  Mixed hyperlipidemia  -     Lipid Panel with Direct LDL reflex; Future; Expected date: 02/10/2020  -     TSH, 3rd generation with Free T4 reflex; Future; Expected date: 02/10/2020    5  Benign essential hypertension  -     CBC; Future; Expected date: 02/10/2020  -     Comprehensive metabolic panel; Future; Expected date: 02/10/2020    BMI Counseling: Body mass index is 36 42 kg/m²  The BMI is above normal  Nutrition recommendations include encouraging healthy choices of fruits and vegetables  Exercise recommendations include exercising 3-5 times per week  No pharmacotherapy was ordered  There are no Patient Instructions on file for this visit  No follow-ups on file  Subjective:      Patient ID: Shaye Millard is a 48 y o  female  Chief Complaint   Patient presents with   Debora Mix     Patient here with B/L ear pain discharge out of the right ear nausea and diarrhea        Started with ear pain last week  Had discharge  Taking tylenol  No fever  Sore throat-improved    URI    This is a new problem  The current episode started 1 to 4 weeks ago  There has been no fever  Associated symptoms include congestion, ear pain, headaches, sinus pain and a sore throat (imrpoved)  Pertinent negatives include no coughing or plugged ear sensation  She has tried acetaminophen for the symptoms  The treatment provided mild relief  The following portions of the patient's history were reviewed and updated as appropriate:  past social history    Review of Systems   Constitutional: Negative      HENT: Positive for congestion, ear pain, sinus pain and sore throat (imrpoved)  Eyes: Negative  Respiratory: Negative for cough  Cardiovascular: Negative  Gastrointestinal: Negative  Endocrine: Negative  Genitourinary: Negative  Musculoskeletal: Negative  Skin: Negative  Allergic/Immunologic: Negative  Neurological: Positive for headaches  Hematological: Negative  Psychiatric/Behavioral: Negative  Current Outpatient Medications   Medication Sig Dispense Refill    buPROPion (WELLBUTRIN SR) 200 MG 12 hr tablet Take 1 tablet by mouth daily      clonazePAM (KlonoPIN) 0 5 mg tablet Take 1 tablet by mouth daily at bedtime      FLUoxetine (PROzac) 40 MG capsule Take 1 capsule by mouth daily      gemfibrozil (LOPID) 600 mg tablet TAKE 1 TABLET BY MOUTH TWO TIMES DAILY 60 tablet 0    labetalol (NORMODYNE) 100 mg tablet TAKE 1 TABLET BY MOUTH EVERY 12 HOURS 60 tablet 0    Multiple Vitamins-Minerals (PRESERVISION AREDS 2) CAPS Take by mouth      Multiple Vitamins-Minerals (WOMENS 50+ MULTI VITAMIN/MIN) TABS Take by mouth      Omega-3 Fatty Acids (FISH OIL EXTRA STRENGTH PO) Take by mouth Taking 2 in the morning and 2 in the evening      VITAMIN D, ERGOCALCIFEROL, PO Take by mouth Taking 2,000 daily      neomycin-polymyxin-hydrocortisone (CORTISPORIN) 0 35%-10,000 units/mL-1% otic suspension Administer 4 drops to the right ear 4 (four) times a day for 7 days 10 mL 0    sulfamethoxazole-trimethoprim (BACTRIM DS) 800-160 mg per tablet Take 1 tablet by mouth every 12 (twelve) hours for 10 days 20 tablet 0     No current facility-administered medications for this visit  Objective:    /90   Pulse 98   Temp (!) 97 3 °F (36 3 °C)   Resp 18   Ht 5' 6 81" (1 697 m)   Wt 105 kg (231 lb 3 2 oz)   SpO2 96%   BMI 36 42 kg/m²      Physical Exam   Constitutional: She appears well-developed and well-nourished  HENT:   Head: Normocephalic and atraumatic     Right Ear: External ear normal  Left Ear: External ear normal  Tympanic membrane is injected  Nose: Nose normal    Mouth/Throat: Oropharynx is clear and moist    Eyes: Pupils are equal, round, and reactive to light  Conjunctivae and EOM are normal    Neck: Normal range of motion  Neck supple  Cardiovascular: Normal rate, regular rhythm, normal heart sounds and intact distal pulses  Pulmonary/Chest: Effort normal and breath sounds normal    Abdominal: Soft  Bowel sounds are normal    Musculoskeletal: Normal range of motion  Neurological: She is alert  Skin: Skin is warm  Capillary refill takes less than 2 seconds  Psychiatric: She has a normal mood and affect  Nursing note and vitals reviewed            Luly Mirza DO

## 2020-02-14 ENCOUNTER — APPOINTMENT (OUTPATIENT)
Dept: LAB | Facility: AMBULARY SURGERY CENTER | Age: 55
End: 2020-02-14
Payer: MEDICARE

## 2020-02-14 DIAGNOSIS — E78.2 MIXED HYPERLIPIDEMIA: ICD-10-CM

## 2020-02-14 DIAGNOSIS — I10 BENIGN ESSENTIAL HYPERTENSION: ICD-10-CM

## 2020-02-14 LAB
ALBUMIN SERPL BCP-MCNC: 3.7 G/DL (ref 3.5–5)
ALP SERPL-CCNC: 50 U/L (ref 46–116)
ALT SERPL W P-5'-P-CCNC: 42 U/L (ref 12–78)
ANION GAP SERPL CALCULATED.3IONS-SCNC: 8 MMOL/L (ref 4–13)
AST SERPL W P-5'-P-CCNC: 21 U/L (ref 5–45)
BILIRUB SERPL-MCNC: 0.34 MG/DL (ref 0.2–1)
BUN SERPL-MCNC: 16 MG/DL (ref 5–25)
CALCIUM SERPL-MCNC: 9.3 MG/DL (ref 8.3–10.1)
CHLORIDE SERPL-SCNC: 111 MMOL/L (ref 100–108)
CHOLEST SERPL-MCNC: 256 MG/DL (ref 50–200)
CO2 SERPL-SCNC: 21 MMOL/L (ref 21–32)
CREAT SERPL-MCNC: 1.01 MG/DL (ref 0.6–1.3)
ERYTHROCYTE [DISTWIDTH] IN BLOOD BY AUTOMATED COUNT: 12.2 % (ref 11.6–15.1)
GFR SERPL CREATININE-BSD FRML MDRD: 63 ML/MIN/1.73SQ M
GLUCOSE P FAST SERPL-MCNC: 108 MG/DL (ref 65–99)
HCT VFR BLD AUTO: 42.5 % (ref 34.8–46.1)
HDLC SERPL-MCNC: 42 MG/DL
HGB BLD-MCNC: 13.6 G/DL (ref 11.5–15.4)
LDLC SERPL CALC-MCNC: 170 MG/DL (ref 0–100)
MCH RBC QN AUTO: 28.6 PG (ref 26.8–34.3)
MCHC RBC AUTO-ENTMCNC: 32 G/DL (ref 31.4–37.4)
MCV RBC AUTO: 90 FL (ref 82–98)
PLATELET # BLD AUTO: 397 THOUSANDS/UL (ref 149–390)
PMV BLD AUTO: 11.3 FL (ref 8.9–12.7)
POTASSIUM SERPL-SCNC: 4.2 MMOL/L (ref 3.5–5.3)
PROT SERPL-MCNC: 7.3 G/DL (ref 6.4–8.2)
RBC # BLD AUTO: 4.75 MILLION/UL (ref 3.81–5.12)
SODIUM SERPL-SCNC: 140 MMOL/L (ref 136–145)
TRIGL SERPL-MCNC: 219 MG/DL
TSH SERPL DL<=0.05 MIU/L-ACNC: 2.79 UIU/ML (ref 0.36–3.74)
WBC # BLD AUTO: 4.97 THOUSAND/UL (ref 4.31–10.16)

## 2020-02-14 PROCEDURE — 80061 LIPID PANEL: CPT

## 2020-02-14 PROCEDURE — 36415 COLL VENOUS BLD VENIPUNCTURE: CPT

## 2020-02-14 PROCEDURE — 85027 COMPLETE CBC AUTOMATED: CPT

## 2020-02-14 PROCEDURE — 84443 ASSAY THYROID STIM HORMONE: CPT

## 2020-02-14 PROCEDURE — 80053 COMPREHEN METABOLIC PANEL: CPT

## 2020-02-17 ENCOUNTER — OFFICE VISIT (OUTPATIENT)
Dept: FAMILY MEDICINE CLINIC | Facility: CLINIC | Age: 55
End: 2020-02-17
Payer: MEDICARE

## 2020-02-17 VITALS
WEIGHT: 228.4 LBS | HEIGHT: 67 IN | RESPIRATION RATE: 16 BRPM | SYSTOLIC BLOOD PRESSURE: 114 MMHG | BODY MASS INDEX: 35.85 KG/M2 | TEMPERATURE: 97 F | OXYGEN SATURATION: 97 % | DIASTOLIC BLOOD PRESSURE: 80 MMHG | HEART RATE: 91 BPM

## 2020-02-17 DIAGNOSIS — R73.01 IFG (IMPAIRED FASTING GLUCOSE): ICD-10-CM

## 2020-02-17 DIAGNOSIS — E66.01 SEVERE OBESITY (BMI 35.0-39.9) WITH COMORBIDITY (HCC): ICD-10-CM

## 2020-02-17 DIAGNOSIS — I10 BENIGN ESSENTIAL HYPERTENSION: Primary | ICD-10-CM

## 2020-02-17 DIAGNOSIS — Z23 NEED FOR VACCINATION: ICD-10-CM

## 2020-02-17 DIAGNOSIS — E78.2 MIXED HYPERLIPIDEMIA: ICD-10-CM

## 2020-02-17 DIAGNOSIS — Z00.00 MEDICARE ANNUAL WELLNESS VISIT, SUBSEQUENT: ICD-10-CM

## 2020-02-17 PROCEDURE — G0008 ADMIN INFLUENZA VIRUS VAC: HCPCS

## 2020-02-17 PROCEDURE — 1036F TOBACCO NON-USER: CPT | Performed by: FAMILY MEDICINE

## 2020-02-17 PROCEDURE — G0439 PPPS, SUBSEQ VISIT: HCPCS | Performed by: FAMILY MEDICINE

## 2020-02-17 PROCEDURE — 3008F BODY MASS INDEX DOCD: CPT | Performed by: FAMILY MEDICINE

## 2020-02-17 PROCEDURE — 99214 OFFICE O/P EST MOD 30 MIN: CPT | Performed by: FAMILY MEDICINE

## 2020-02-17 PROCEDURE — 3074F SYST BP LT 130 MM HG: CPT | Performed by: FAMILY MEDICINE

## 2020-02-17 PROCEDURE — 90682 RIV4 VACC RECOMBINANT DNA IM: CPT

## 2020-02-17 PROCEDURE — 3079F DIAST BP 80-89 MM HG: CPT | Performed by: FAMILY MEDICINE

## 2020-02-17 RX ORDER — LORAZEPAM 0.5 MG/1
TABLET ORAL EVERY 8 HOURS PRN
COMMUNITY
End: 2021-12-17 | Stop reason: ALTCHOICE

## 2020-02-17 RX ORDER — EZETIMIBE 10 MG/1
10 TABLET ORAL DAILY
Qty: 30 TABLET | Refills: 5 | Status: SHIPPED | OUTPATIENT
Start: 2020-02-17 | End: 2020-07-21 | Stop reason: SINTOL

## 2020-02-17 NOTE — PROGRESS NOTES
Assessment/Plan:    1  Benign essential hypertension  -     Comprehensive metabolic panel; Future; Expected date: 06/15/2020  -     TSH, 3rd generation with Free T4 reflex; Future; Expected date: 06/15/2020  -     CBC; Future; Expected date: 06/15/2020    2  Medicare annual wellness visit, subsequent    3  Need for vaccination  -     influenza vaccine, 5432-0450, quadrivalent, recombinant, PF, 0 5 mL, for patients 18 yr+ (FLUBLOK)    4  Mixed hyperlipidemia  Assessment & Plan:  Add zetia    Orders:  -     ezetimibe (ZETIA) 10 mg tablet; Take 1 tablet (10 mg total) by mouth daily  -     Lipid Panel with Direct LDL reflex; Future; Expected date: 06/15/2020  -     TSH, 3rd generation with Free T4 reflex; Future; Expected date: 06/15/2020    5  IFG (impaired fasting glucose)  Assessment & Plan:  Check a1c    Orders:  -     Hemoglobin A1C; Future; Expected date: 06/15/2020    6  Severe obesity (BMI 35 0-39  9) with comorbidity [facility])  Assessment & Plan:  Losing weight            Patient Instructions       Medicare Preventive Visit Patient Instructions  Thank you for completing your Welcome to Medicare Visit or Medicare Annual Wellness Visit today  Your next wellness visit will be due in one year (2/17/2021)  The screening/preventive services that you may require over the next 5-10 years are detailed below  Some tests may not apply to you based off risk factors and/or age  Screening tests ordered at today's visit but not completed yet may show as past due  Also, please note that scanned in results may not display below    Preventive Screenings:  Service Recommendations Previous Testing/Comments   Colorectal Cancer Screening  * Colonoscopy    * Fecal Occult Blood Test (FOBT)/Fecal Immunochemical Test (FIT)  * Fecal DNA/Cologuard Test  * Flexible Sigmoidoscopy Age: 54-65 years old   Colonoscopy: every 10 years (may be performed more frequently if at higher risk)  OR  FOBT/FIT: every 1 year  OR  Cologuard: every 3 years OR  Sigmoidoscopy: every 5 years  Screening may be recommended earlier than age 48 if at higher risk for colorectal cancer  Also, an individualized decision between you and your healthcare provider will decide whether screening between the ages of 74-80 would be appropriate  Colonoscopy: 11/06/2014  FOBT/FIT: Not on file  Cologuard: Not on file  Sigmoidoscopy: Not on file         Breast Cancer Screening Age: 36 years old  Frequency: every 1-2 years  Not required if history of left and right mastectomy Mammogram: 04/15/2019       Cervical Cancer Screening Between the ages of 21-29, pap smear recommended once every 3 years  Between the ages of 33-67, can perform pap smear with HPV co-testing every 5 years  Recommendations may differ for women with a history of total hysterectomy, cervical cancer, or abnormal pap smears in past  Pap Smear: 04/05/2019       Hepatitis C Screening Once for adults born between 1945 and 1965  More frequently in patients at high risk for Hepatitis C Hep C Antibody: Not on file       Diabetes Screening 1-2 times per year if you're at risk for diabetes or have pre-diabetes Fasting glucose: 108 mg/dL   A1C: 5 6 %       Cholesterol Screening Once every 5 years if you don't have a lipid disorder  May order more often based on risk factors  Lipid panel: 02/14/2020         Other Preventive Screenings Covered by Medicare:  1  Abdominal Aortic Aneurysm (AAA) Screening: covered once if your at risk  You're considered to be at risk if you have a family history of AAA  2  Lung Cancer Screening: covers low dose CT scan once per year if you meet all of the following conditions: (1) Age 50-69; (2) No signs or symptoms of lung cancer; (3) Current smoker or have quit smoking within the last 15 years; (4) You have a tobacco smoking history of at least 30 pack years (packs per day multiplied by number of years you smoked); (5) You get a written order from a healthcare provider    3  Glaucoma Screening: covered annually if you're considered high risk: (1) You have diabetes OR (2) Family history of glaucoma OR (3)  aged 48 and older OR (3)  American aged 72 and older  3  Osteoporosis Screening: covered every 2 years if you meet one of the following conditions: (1) You're estrogen deficient and at risk for osteoporosis based off medical history and other findings; (2) Have a vertebral abnormality; (3) On glucocorticoid therapy for more than 3 months; (4) Have primary hyperparathyroidism; (5) On osteoporosis medications and need to assess response to drug therapy  · Last bone density test (DXA Scan): Not on file  5  HIV Screening: covered annually if you're between the age of 12-76  Also covered annually if you are younger than 13 and older than 72 with risk factors for HIV infection  For pregnant patients, it is covered up to 3 times per pregnancy  Immunizations:  Immunization Recommendations   Influenza Vaccine Annual influenza vaccination during flu season is recommended for all persons aged >= 6 months who do not have contraindications   Pneumococcal Vaccine (Prevnar and Pneumovax)  * Prevnar = PCV13  * Pneumovax = PPSV23   Adults 25-60 years old: 1-3 doses may be recommended based on certain risk factors  Adults 72 years old: Prevnar (PCV13) vaccine recommended followed by Pneumovax (PPSV23) vaccine  If already received PPSV23 since turning 65, then PCV13 recommended at least one year after PPSV23 dose  Hepatitis B Vaccine 3 dose series if at intermediate or high risk (ex: diabetes, end stage renal disease, liver disease)   Tetanus (Td) Vaccine - COST NOT COVERED BY MEDICARE PART B Following completion of primary series, a booster dose should be given every 10 years to maintain immunity against tetanus  Td may also be given as tetanus wound prophylaxis  Tdap Vaccine - COST NOT COVERED BY MEDICARE PART B Recommended at least once for all adults   For pregnant patients, recommended with each pregnancy  Shingles Vaccine (Shingrix) - COST NOT COVERED BY MEDICARE PART B  2 shot series recommended in those aged 48 and above     Health Maintenance Due:      Topic Date Due    MAMMOGRAM  04/15/2020    Cervical Cancer Screening  04/05/2022    CRC Screening: Colonoscopy  11/06/2024     Immunizations Due:      Topic Date Due    Influenza Vaccine  07/01/2019     Advance Directives   What are advance directives? Advance directives are legal documents that state your wishes and plans for medical care  These plans are made ahead of time in case you lose your ability to make decisions for yourself  Advance directives can apply to any medical decision, such as the treatments you want, and if you want to donate organs  What are the types of advance directives? There are many types of advance directives, and each state has rules about how to use them  You may choose a combination of any of the following:  · Living will: This is a written record of the treatment you want  You can also choose which treatments you do not want, which to limit, and which to stop at a certain time  This includes surgery, medicine, IV fluid, and tube feedings  · Durable power of  for healthcare [facility]): This is a written record that states who you want to make healthcare choices for you when you are unable to make them for yourself  This person, called a proxy, is usually a family member or a friend  You may choose more than 1 proxy  · Do not resuscitate (DNR) order:  A DNR order is used in case your heart stops beating or you stop breathing  It is a request not to have certain forms of treatment, such as CPR  A DNR order may be included in other types of advance directives  · Medical directive: This covers the care that you want if you are in a coma, near death, or unable to make decisions for yourself  You can list the treatments you want for each condition   Treatment may include pain medicine, surgery, blood transfusions, dialysis, IV or tube feedings, and a ventilator (breathing machine)  · Values history: This document has questions about your views, beliefs, and how you feel and think about life  This information can help others choose the care that you would choose  Why are advance directives important? An advance directive helps you control your care  Although spoken wishes may be used, it is better to have your wishes written down  Spoken wishes can be misunderstood, or not followed  Treatments may be given even if you do not want them  An advance directive may make it easier for your family to make difficult choices about your care  Weight Management   Why it is important to manage your weight:  Being overweight increases your risk of health conditions such as heart disease, high blood pressure, type 2 diabetes, and certain types of cancer  It can also increase your risk for osteoarthritis, sleep apnea, and other respiratory problems  Aim for a slow, steady weight loss  Even a small amount of weight loss can lower your risk of health problems  How to lose weight safely:  A safe and healthy way to lose weight is to eat fewer calories and get regular exercise  You can lose up about 1 pound a week by decreasing the number of calories you eat by 500 calories each day  Healthy meal plan for weight management:  A healthy meal plan includes a variety of foods, contains fewer calories, and helps you stay healthy  A healthy meal plan includes the following:  · Eat whole-grain foods more often  A healthy meal plan should contain fiber  Fiber is the part of grains, fruits, and vegetables that is not broken down by your body  Whole-grain foods are healthy and provide extra fiber in your diet  Some examples of whole-grain foods are whole-wheat breads and pastas, oatmeal, brown rice, and bulgur  · Eat a variety of vegetables every day    Include dark, leafy greens such as spinach, kale, andre greens, and mustard greens  Eat yellow and orange vegetables such as carrots, sweet potatoes, and winter squash  · Eat a variety of fruits every day  Choose fresh or canned fruit (canned in its own juice or light syrup) instead of juice  Fruit juice has very little or no fiber  · Eat low-fat dairy foods  Drink fat-free (skim) milk or 1% milk  Eat fat-free yogurt and low-fat cottage cheese  Try low-fat cheeses such as mozzarella and other reduced-fat cheeses  · Choose meat and other protein foods that are low in fat  Choose beans or other legumes such as split peas or lentils  Choose fish, skinless poultry (chicken or turkey), or lean cuts of red meat (beef or pork)  Before you cook meat or poultry, cut off any visible fat  · Use less fat and oil  Try baking foods instead of frying them  Add less fat, such as margarine, sour cream, regular salad dressing and mayonnaise to foods  Eat fewer high-fat foods  Some examples of high-fat foods include french fries, doughnuts, ice cream, and cakes  · Eat fewer sweets  Limit foods and drinks that are high in sugar  This includes candy, cookies, regular soda, and sweetened drinks  Exercise:  Exercise at least 30 minutes per day on most days of the week  Some examples of exercise include walking, biking, dancing, and swimming  You can also fit in more physical activity by taking the stairs instead of the elevator or parking farther away from stores  Ask your healthcare provider about the best exercise plan for you  © Copyright [employer] 2018 Information is for End User's use only and may not be sold, redistributed or otherwise used for commercial purposes  All illustrations and images included in CareNotes® are the copyrighted property of [employer]  or [address]        Return in 1 year (on 2/17/2021)  Subjective:      Patient ID: Michelle Gage is a 48 y o  female      Chief Complaint   Patient presents with   [facility] Wellness Visit Patient here for Medicare wellness exam        Here for awv and follow up  Labs discussed  Cholesterol went up  Diet discussed    Hypertension   This is a chronic problem  The current episode started more than 1 year ago  The problem is unchanged  The problem is controlled  There are no associated agents to hypertension  Risk factors for coronary artery disease include dyslipidemia  Past treatments include beta blockers  The current treatment provides significant improvement  There are no compliance problems  Hyperlipidemia   This is a chronic problem  The current episode started more than 1 year ago  The problem is controlled  Recent lipid tests were reviewed and are normal  Current antihyperlipidemic treatment includes fibric acid derivatives  The current treatment provides moderate improvement of lipids  There are no compliance problems  The following portions of the patient's history were reviewed and updated as appropriate: allergies, current medications, past family history, past medical history, past social history, past surgical history and problem list     Review of Systems   Constitutional: Negative  HENT: Positive for ear pain  Eyes: Negative  Respiratory: Negative  Cardiovascular: Negative  Gastrointestinal: Negative  Endocrine: Negative  Genitourinary: Negative  Musculoskeletal: Negative  Allergic/Immunologic: Negative  Neurological: Negative  Hematological: Negative  Psychiatric/Behavioral: Negative            Current Outpatient Medications   Medication Sig Dispense Refill    buPROPion (WELLBUTRIN SR) 200 MG 12 hr tablet Take 1 tablet by mouth daily      FLUoxetine (PROzac) 40 MG capsule Take 1 capsule by mouth daily      gemfibrozil (LOPID) 600 mg tablet Take 1 tablet (600 mg total) by mouth 2 (two) times a day 60 tablet 5    labetalol (NORMODYNE) 100 mg tablet Take 1 tablet (100 mg total) by mouth every 12 (twelve) hours 60 tablet 5    LORazepam (ATIVAN) 0 5 mg tablet Take by mouth every 8 (eight) hours as needed for anxiety      Multiple Vitamins-Minerals (PRESERVISION AREDS 2) CAPS Take by mouth      Multiple Vitamins-Minerals (WOMENS 50+ MULTI VITAMIN/MIN) TABS Take by mouth      Omega-3 Fatty Acids (FISH OIL EXTRA STRENGTH PO) Take by mouth Taking 2 in the morning and 2 in the evening      sulfamethoxazole-trimethoprim (BACTRIM DS) 800-160 mg per tablet Take 1 tablet by mouth every 12 (twelve) hours for 10 days 20 tablet 0    VITAMIN D, ERGOCALCIFEROL, PO Take by mouth Taking 2,000 daily      ezetimibe (ZETIA) 10 mg tablet Take 1 tablet (10 mg total) by mouth daily 30 tablet 5     No current facility-administered medications for this visit  Objective:    /80   Pulse 91   Temp (!) 97 °F (36 1 °C)   Resp 16   Ht 5' 6 58" (1 691 m)   Wt 104 kg (228 lb 6 4 oz)   SpO2 97%   BMI 36 23 kg/m²        Physical Exam   Constitutional: She appears well-developed and well-nourished  HENT:   Head: Normocephalic and atraumatic  Eyes: Pupils are equal, round, and reactive to light  EOM are normal    Neck: Normal range of motion  Neck supple  Cardiovascular: Normal rate, regular rhythm, normal heart sounds and intact distal pulses  Pulmonary/Chest: Effort normal and breath sounds normal    Abdominal: Soft  Bowel sounds are normal    Musculoskeletal: Normal range of motion  Neurological: She is alert  Skin: Skin is warm  Capillary refill takes less than 2 seconds  Nursing note and vitals reviewed               Michael Siu DO

## 2020-02-17 NOTE — PROGRESS NOTES
Assessment and Plan:     Problem List Items Addressed This Visit        Other    Medicare annual wellness visit, subsequent - Primary      Other Visit Diagnoses     Need for vaccination        Relevant Orders    influenza vaccine, [id number], quadrivalent, recombinant, PF, 0 5 mL, for patients 18 yr+ (FLUBLOK)           Preventive health issues were discussed with patient, and age appropriate screening tests were ordered as noted in patient's After Visit Summary  Personalized health advice and appropriate referrals for health education or preventive services given if needed, as noted in patient's After Visit Summary  History of Present Illness:     Patient presents for Medicare Annual Wellness visit    Patient Care Team:  Michael Siu DO as PCP - General  DO Kalpesh Walter MD Brynda Hidden, MD (Psychiatry)     Problem List:     Patient Active Problem List   Diagnosis    Hyperlipidemia    Anxiety state    Benign essential hypertension    Bipolar I disorder, single manic episode ([address] )    Gastroesophageal reflux disease    IFG (impaired fasting glucose)    Iron deficiency anemia secondary to inadequate dietary iron intake    Localized pruritus    Nonalcoholic fatty liver disease    Snoring    Vitamin D deficiency    Medicare annual wellness visit, subsequent    Screening for malignant neoplasm of breast    Encounter for immunization    Severe obesity (BMI 35 0-39  9) with comorbidity ([address] )    Otitis of left ear      Past Medical and Surgical History:     Past Medical History:   Diagnosis Date    Anxiety     Bipolar disorder ([address] )     Depression     Hyperlipidemia     Hypertension     Lyme disease     Psychiatric disorder     Shortness of breath on exertion     Vitamin D deficiency      Past Surgical History:   Procedure Laterality Date    ADENOIDECTOMY      ENDOMETRIAL ABLATION      HERNIA REPAIR      TONSILLECTOMY      WISDOM TOOTH EXTRACTION        Family History:     Family History   Problem Relation Age of Onset    Dementia Mother     Hypertension Mother     Stroke Mother     Melanoma Mother 61    Anxiety disorder Father         NOS    Coronary artery disease Father     Diabetes Father         mellitus    Hyperlipidemia Father         pure hypercholesterolemia    Melanoma Father 61    Hyperlipidemia Sister     Lupus Sister     Diabetes Sister         mellitus    Diverticulitis Brother     Stroke Maternal Grandmother     Cancer Paternal Grandfather         unknown type    Thyroid disease Neg Hx       Social History:        Social History     Socioeconomic History    Marital status: /Civil Union     Spouse name: Not on file    Number of children: Not on file    Years of education: Not on file    Highest education level: Not on file   Occupational History    Not on file   Social Needs    Financial resource strain: Not on file    Food insecurity:     Worry: Not on file     Inability: Not on file    Transportation needs:     Medical: Not on file     Non-medical: Not on file   Tobacco Use    Smoking status: Former Smoker     Last attempt to quit: 2017     Years since quittin 1    Smokeless tobacco: Never Used   Substance and Sexual Activity    Alcohol use: Yes     Comment: rare, few times per year    Drug use: No    Sexual activity: Yes     Partners: Male   Lifestyle    Physical activity:     Days per week: Not on file     Minutes per session: Not on file    Stress: Not on file   Relationships    Social connections:     Talks on phone: Not on file     Gets together: Not on file     Attends [employer] service: Not on file     Active member of club or organization: Not on file     Attends meetings of clubs or organizations: Not on file     Relationship status: Not on file    Intimate partner violence:     Fear of current or ex partner: Not on file     Emotionally abused: Not on file     Physically abused: Not on file Forced sexual activity: Not on file   Other Topics Concern    Not on file   Social History Narrative    Not on file      Medications and Allergies:     Current Outpatient Medications   Medication Sig Dispense Refill    buPROPion (WELLBUTRIN SR) 200 MG 12 hr tablet Take 1 tablet by mouth daily      FLUoxetine (PROzac) 40 MG capsule Take 1 capsule by mouth daily      gemfibrozil (LOPID) 600 mg tablet Take 1 tablet (600 mg total) by mouth 2 (two) times a day 60 tablet 5    labetalol (NORMODYNE) 100 mg tablet Take 1 tablet (100 mg total) by mouth every 12 (twelve) hours 60 tablet 5    LORazepam (ATIVAN) 0 5 mg tablet Take by mouth every 8 (eight) hours as needed for anxiety      Multiple Vitamins-Minerals (PRESERVISION AREDS 2) CAPS Take by mouth      Multiple Vitamins-Minerals (WOMENS 50+ MULTI VITAMIN/MIN) TABS Take by mouth      Omega-3 Fatty Acids (FISH OIL EXTRA STRENGTH PO) Take by mouth Taking 2 in the morning and 2 in the evening      sulfamethoxazole-trimethoprim (BACTRIM DS) 800-160 mg per tablet Take 1 tablet by mouth every 12 (twelve) hours for 10 days 20 tablet 0    VITAMIN D, ERGOCALCIFEROL, PO Take by mouth Taking 2,000 daily       No current facility-administered medications for this visit        Allergies   Allergen Reactions    Atorvastatin Confusion    Penicillins Swelling and Rash      Immunizations:     Immunization History   Administered Date(s) Administered    INFLUENZA 10/16/2014, 02/23/2017, 12/10/2018    Influenza Quadrivalent Preservative Free 3 years and older IM 10/16/2014, 02/23/2017    Influenza, injectable, quadrivalent, preservative free 0 5 mL 12/10/2018      Health Maintenance:         Topic Date Due    MAMMOGRAM  04/15/2020    Cervical Cancer Screening  04/05/2022    CRC Screening: Colonoscopy  11/06/2024         Topic Date Due    Influenza Vaccine  07/01/2019      Medicare Health Risk Assessment:     /80   Pulse 91   Temp (!) 97 °F (36 1 °C)   Resp 16  5' 6 58" (1 691 m)   Wt 104 kg (228 lb 6 4 oz)   SpO2 97%   BMI 36 23 kg/m²      Ben Negrete is here for her Subsequent Wellness visit  Health Risk Assessment:   Patient rates overall health as good  Patient feels that their physical health rating is slightly worse  Eyesight was rated as slightly worse  Hearing was rated as same  Pain experienced in the last 7 days has been some  Patient's pain rating has been 4/10  Fall Risk Screening: In the past year, patient has experienced: no history of falling in past year      Urinary Incontinence Screening:   Patient has not leaked urine accidently in the last six months  Home Safety:  Patient does not have trouble with stairs inside or outside of their home  Patient has working smoke alarms and has working carbon monoxide detector  Home safety hazards include: none  Nutrition:   Current diet is Low Carb  Medications:   Patient is currently taking over-the-counter supplements  OTC medications include: see medication list  Patient is able to manage medications  Activities of Daily Living (ADLs)/Instrumental Activities of Daily Living (IADLs):   Walk and transfer into and out of bed and chair?: Yes  Dress and groom yourself?: Yes    Bathe or shower yourself?: Yes    Feed yourself?  Yes  Do your laundry/housekeeping?: Yes  Manage your money, pay your bills and track your expenses?: Yes  Make your own meals?: Yes    Do your own shopping?: Yes    Previous Hospitalizations:   Any hospitalizations or ED visits within the last 12 months?: No      Advance Care Planning:   Living will: No    Durable POA for healthcare: No    Five wishes given: Yes      Cognitive Screening:   Provider or family/friend/caregiver concerned regarding cognition?: No    PREVENTIVE SCREENINGS      Cardiovascular Screening:    General: Screening Not Indicated and History Lipid Disorder      Diabetes Screening:     General: Screening Current      Colorectal Cancer Screening: General: Screening Current      Breast Cancer Screening:     General: Screening Current      Cervical Cancer Screening:    General: Screening Current      Luly Mirza DO

## 2020-02-17 NOTE — PATIENT INSTRUCTIONS
Medicare Preventive Visit Patient Instructions  Thank you for completing your Welcome to Medicare Visit or Medicare Annual Wellness Visit today  Your next wellness visit will be due in one year (2/17/2021)  The screening/preventive services that you may require over the next 5-10 years are detailed below  Some tests may not apply to you based off risk factors and/or age  Screening tests ordered at today's visit but not completed yet may show as past due  Also, please note that scanned in results may not display below  Preventive Screenings:  Service Recommendations Previous Testing/Comments   Colorectal Cancer Screening  * Colonoscopy    * Fecal Occult Blood Test (FOBT)/Fecal Immunochemical Test (FIT)  * Fecal DNA/Cologuard Test  * Flexible Sigmoidoscopy Age: 54-65 years old   Colonoscopy: every 10 years (may be performed more frequently if at higher risk)  OR  FOBT/FIT: every 1 year  OR  Cologuard: every 3 years  OR  Sigmoidoscopy: every 5 years  Screening may be recommended earlier than age 48 if at higher risk for colorectal cancer  Also, an individualized decision between you and your healthcare provider will decide whether screening between the ages of 74-80 would be appropriate  Colonoscopy: 11/06/2014  FOBT/FIT: Not on file  Cologuard: Not on file  Sigmoidoscopy: Not on file         Breast Cancer Screening Age: 36 years old  Frequency: every 1-2 years  Not required if history of left and right mastectomy Mammogram: 04/15/2019       Cervical Cancer Screening Between the ages of 21-29, pap smear recommended once every 3 years  Between the ages of 33-67, can perform pap smear with HPV co-testing every 5 years     Recommendations may differ for women with a history of total hysterectomy, cervical cancer, or abnormal pap smears in past  Pap Smear: 04/05/2019       Hepatitis C Screening Once for adults born between 1945 and 1965  More frequently in patients at high risk for Hepatitis C Hep C Antibody: Not on file       Diabetes Screening 1-2 times per year if you're at risk for diabetes or have pre-diabetes Fasting glucose: 108 mg/dL   A1C: 5 6 %       Cholesterol Screening Once every 5 years if you don't have a lipid disorder  May order more often based on risk factors  Lipid panel: 02/14/2020         Other Preventive Screenings Covered by Medicare:  1  Abdominal Aortic Aneurysm (AAA) Screening: covered once if your at risk  You're considered to be at risk if you have a family history of AAA  2  Lung Cancer Screening: covers low dose CT scan once per year if you meet all of the following conditions: (1) Age 50-69; (2) No signs or symptoms of lung cancer; (3) Current smoker or have quit smoking within the last 15 years; (4) You have a tobacco smoking history of at least 30 pack years (packs per day multiplied by number of years you smoked); (5) You get a written order from a healthcare provider  3  Glaucoma Screening: covered annually if you're considered high risk: (1) You have diabetes OR (2) Family history of glaucoma OR (3)  aged 48 and older OR (3)  American aged 72 and older  3  Osteoporosis Screening: covered every 2 years if you meet one of the following conditions: (1) You're estrogen deficient and at risk for osteoporosis based off medical history and other findings; (2) Have a vertebral abnormality; (3) On glucocorticoid therapy for more than 3 months; (4) Have primary hyperparathyroidism; (5) On osteoporosis medications and need to assess response to drug therapy  · Last bone density test (DXA Scan): Not on file  5  HIV Screening: covered annually if you're between the age of 12-76  Also covered annually if you are younger than 13 and older than 72 with risk factors for HIV infection  For pregnant patients, it is covered up to 3 times per pregnancy      Immunizations:  Immunization Recommendations   Influenza Vaccine Annual influenza vaccination during flu season is recommended for all persons aged >= 6 months who do not have contraindications   Pneumococcal Vaccine (Prevnar and Pneumovax)  * Prevnar = PCV13  * Pneumovax = PPSV23   Adults 25-60 years old: 1-3 doses may be recommended based on certain risk factors  Adults 72 years old: Prevnar (PCV13) vaccine recommended followed by Pneumovax (PPSV23) vaccine  If already received PPSV23 since turning 65, then PCV13 recommended at least one year after PPSV23 dose  Hepatitis B Vaccine 3 dose series if at intermediate or high risk (ex: diabetes, end stage renal disease, liver disease)   Tetanus (Td) Vaccine - COST NOT COVERED BY MEDICARE PART B Following completion of primary series, a booster dose should be given every 10 years to maintain immunity against tetanus  Td may also be given as tetanus wound prophylaxis  Tdap Vaccine - COST NOT COVERED BY MEDICARE PART B Recommended at least once for all adults  For pregnant patients, recommended with each pregnancy  Shingles Vaccine (Shingrix) - COST NOT COVERED BY MEDICARE PART B  2 shot series recommended in those aged 48 and above     Health Maintenance Due:      Topic Date Due    MAMMOGRAM  04/15/2020    Cervical Cancer Screening  04/05/2022    CRC Screening: Colonoscopy  11/06/2024     Immunizations Due:      Topic Date Due    Influenza Vaccine  07/01/2019     Advance Directives   What are advance directives? Advance directives are legal documents that state your wishes and plans for medical care  These plans are made ahead of time in case you lose your ability to make decisions for yourself  Advance directives can apply to any medical decision, such as the treatments you want, and if you want to donate organs  What are the types of advance directives? There are many types of advance directives, and each state has rules about how to use them  You may choose a combination of any of the following:  · Living will: This is a written record of the treatment you want   You can also choose which treatments you do not want, which to limit, and which to stop at a certain time  This includes surgery, medicine, IV fluid, and tube feedings  · Durable power of  for healthcare [facility]): This is a written record that states who you want to make healthcare choices for you when you are unable to make them for yourself  This person, called a proxy, is usually a family member or a friend  You may choose more than 1 proxy  · Do not resuscitate (DNR) order:  A DNR order is used in case your heart stops beating or you stop breathing  It is a request not to have certain forms of treatment, such as CPR  A DNR order may be included in other types of advance directives  · Medical directive: This covers the care that you want if you are in a coma, near death, or unable to make decisions for yourself  You can list the treatments you want for each condition  Treatment may include pain medicine, surgery, blood transfusions, dialysis, IV or tube feedings, and a ventilator (breathing machine)  · Values history: This document has questions about your views, beliefs, and how you feel and think about life  This information can help others choose the care that you would choose  Why are advance directives important? An advance directive helps you control your care  Although spoken wishes may be used, it is better to have your wishes written down  Spoken wishes can be misunderstood, or not followed  Treatments may be given even if you do not want them  An advance directive may make it easier for your family to make difficult choices about your care  Weight Management   Why it is important to manage your weight:  Being overweight increases your risk of health conditions such as heart disease, high blood pressure, type 2 diabetes, and certain types of cancer  It can also increase your risk for osteoarthritis, sleep apnea, and other respiratory problems  Aim for a slow, steady weight loss   Even a small amount of weight loss can lower your risk of health problems  How to lose weight safely:  A safe and healthy way to lose weight is to eat fewer calories and get regular exercise  You can lose up about 1 pound a week by decreasing the number of calories you eat by 500 calories each day  Healthy meal plan for weight management:  A healthy meal plan includes a variety of foods, contains fewer calories, and helps you stay healthy  A healthy meal plan includes the following:  · Eat whole-grain foods more often  A healthy meal plan should contain fiber  Fiber is the part of grains, fruits, and vegetables that is not broken down by your body  Whole-grain foods are healthy and provide extra fiber in your diet  Some examples of whole-grain foods are whole-wheat breads and pastas, oatmeal, brown rice, and bulgur  · Eat a variety of vegetables every day  Include dark, leafy greens such as spinach, kale, andre greens, and mustard greens  Eat yellow and orange vegetables such as carrots, sweet potatoes, and winter squash  · Eat a variety of fruits every day  Choose fresh or canned fruit (canned in its own juice or light syrup) instead of juice  Fruit juice has very little or no fiber  · Eat low-fat dairy foods  Drink fat-free (skim) milk or 1% milk  Eat fat-free yogurt and low-fat cottage cheese  Try low-fat cheeses such as mozzarella and other reduced-fat cheeses  · Choose meat and other protein foods that are low in fat  Choose beans or other legumes such as split peas or lentils  Choose fish, skinless poultry (chicken or turkey), or lean cuts of red meat (beef or pork)  Before you cook meat or poultry, cut off any visible fat  · Use less fat and oil  Try baking foods instead of frying them  Add less fat, such as margarine, sour cream, regular salad dressing and mayonnaise to foods  Eat fewer high-fat foods  Some examples of high-fat foods include french fries, doughnuts, ice cream, and cakes  · Eat fewer sweets  Limit foods and drinks that are high in sugar  This includes candy, cookies, regular soda, and sweetened drinks  Exercise:  Exercise at least 30 minutes per day on most days of the week  Some examples of exercise include walking, biking, dancing, and swimming  You can also fit in more physical activity by taking the stairs instead of the elevator or parking farther away from stores  Ask your healthcare provider about the best exercise plan for you  © Copyright [employer] 2018 Information is for End User's use only and may not be sold, redistributed or otherwise used for commercial purposes   All illustrations and images included in CareNotes® are the copyrighted property of [employer]  or [address] [place] [address]

## 2020-07-09 ENCOUNTER — APPOINTMENT (OUTPATIENT)
Dept: LAB | Facility: AMBULARY SURGERY CENTER | Age: 55
End: 2020-07-09
Payer: MEDICARE

## 2020-07-09 DIAGNOSIS — E78.2 MIXED HYPERLIPIDEMIA: ICD-10-CM

## 2020-07-09 DIAGNOSIS — R73.01 IFG (IMPAIRED FASTING GLUCOSE): ICD-10-CM

## 2020-07-09 DIAGNOSIS — I10 BENIGN ESSENTIAL HYPERTENSION: ICD-10-CM

## 2020-07-09 LAB
ALBUMIN SERPL BCP-MCNC: 3.9 G/DL (ref 3.5–5)
ALP SERPL-CCNC: 40 U/L (ref 46–116)
ALT SERPL W P-5'-P-CCNC: 42 U/L (ref 12–78)
ANION GAP SERPL CALCULATED.3IONS-SCNC: 9 MMOL/L (ref 4–13)
AST SERPL W P-5'-P-CCNC: 23 U/L (ref 5–45)
BILIRUB SERPL-MCNC: 0.42 MG/DL (ref 0.2–1)
BUN SERPL-MCNC: 15 MG/DL (ref 5–25)
CALCIUM SERPL-MCNC: 9.2 MG/DL (ref 8.3–10.1)
CHLORIDE SERPL-SCNC: 107 MMOL/L (ref 100–108)
CHOLEST SERPL-MCNC: 236 MG/DL (ref 50–200)
CO2 SERPL-SCNC: 23 MMOL/L (ref 21–32)
CREAT SERPL-MCNC: 1.01 MG/DL (ref 0.6–1.3)
ERYTHROCYTE [DISTWIDTH] IN BLOOD BY AUTOMATED COUNT: 12.1 % (ref 11.6–15.1)
EST. AVERAGE GLUCOSE BLD GHB EST-MCNC: 123 MG/DL
GFR SERPL CREATININE-BSD FRML MDRD: 63 ML/MIN/1.73SQ M
GLUCOSE P FAST SERPL-MCNC: 87 MG/DL (ref 65–99)
HBA1C MFR BLD: 5.9 %
HCT VFR BLD AUTO: 43.1 % (ref 34.8–46.1)
HDLC SERPL-MCNC: 40 MG/DL
HGB BLD-MCNC: 14 G/DL (ref 11.5–15.4)
LDLC SERPL CALC-MCNC: 148 MG/DL (ref 0–100)
MCH RBC QN AUTO: 29.1 PG (ref 26.8–34.3)
MCHC RBC AUTO-ENTMCNC: 32.5 G/DL (ref 31.4–37.4)
MCV RBC AUTO: 90 FL (ref 82–98)
PLATELET # BLD AUTO: 354 THOUSANDS/UL (ref 149–390)
PMV BLD AUTO: 11.8 FL (ref 8.9–12.7)
POTASSIUM SERPL-SCNC: 3.9 MMOL/L (ref 3.5–5.3)
PROT SERPL-MCNC: 7.6 G/DL (ref 6.4–8.2)
RBC # BLD AUTO: 4.81 MILLION/UL (ref 3.81–5.12)
SODIUM SERPL-SCNC: 139 MMOL/L (ref 136–145)
TRIGL SERPL-MCNC: 241 MG/DL
TSH SERPL DL<=0.05 MIU/L-ACNC: 1.96 UIU/ML (ref 0.36–3.74)
WBC # BLD AUTO: 4.71 THOUSAND/UL (ref 4.31–10.16)

## 2020-07-09 PROCEDURE — 83036 HEMOGLOBIN GLYCOSYLATED A1C: CPT

## 2020-07-09 PROCEDURE — 85027 COMPLETE CBC AUTOMATED: CPT

## 2020-07-09 PROCEDURE — 36415 COLL VENOUS BLD VENIPUNCTURE: CPT

## 2020-07-09 PROCEDURE — 80053 COMPREHEN METABOLIC PANEL: CPT

## 2020-07-09 PROCEDURE — 84443 ASSAY THYROID STIM HORMONE: CPT

## 2020-07-09 PROCEDURE — 80061 LIPID PANEL: CPT

## 2020-07-21 ENCOUNTER — OFFICE VISIT (OUTPATIENT)
Dept: FAMILY MEDICINE CLINIC | Facility: CLINIC | Age: 55
End: 2020-07-21
Payer: MEDICARE

## 2020-07-21 VITALS
SYSTOLIC BLOOD PRESSURE: 146 MMHG | RESPIRATION RATE: 16 BRPM | OXYGEN SATURATION: 97 % | TEMPERATURE: 97.1 F | HEIGHT: 67 IN | BODY MASS INDEX: 35.94 KG/M2 | WEIGHT: 229 LBS | HEART RATE: 78 BPM | DIASTOLIC BLOOD PRESSURE: 90 MMHG

## 2020-07-21 DIAGNOSIS — E66.01 SEVERE OBESITY (BMI 35.0-39.9) WITH COMORBIDITY (HCC): ICD-10-CM

## 2020-07-21 DIAGNOSIS — I10 BENIGN ESSENTIAL HYPERTENSION: Primary | ICD-10-CM

## 2020-07-21 DIAGNOSIS — E78.2 MIXED HYPERLIPIDEMIA: ICD-10-CM

## 2020-07-21 DIAGNOSIS — R73.01 IFG (IMPAIRED FASTING GLUCOSE): ICD-10-CM

## 2020-07-21 PROBLEM — H66.92 OTITIS OF LEFT EAR: Status: RESOLVED | Noted: 2020-02-10 | Resolved: 2020-07-21

## 2020-07-21 PROCEDURE — 3080F DIAST BP >= 90 MM HG: CPT | Performed by: FAMILY MEDICINE

## 2020-07-21 PROCEDURE — 99214 OFFICE O/P EST MOD 30 MIN: CPT | Performed by: FAMILY MEDICINE

## 2020-07-21 PROCEDURE — 1036F TOBACCO NON-USER: CPT | Performed by: FAMILY MEDICINE

## 2020-07-21 PROCEDURE — 3077F SYST BP >= 140 MM HG: CPT | Performed by: FAMILY MEDICINE

## 2020-07-21 PROCEDURE — 3008F BODY MASS INDEX DOCD: CPT | Performed by: FAMILY MEDICINE

## 2020-07-21 RX ORDER — LISINOPRIL 10 MG/1
10 TABLET ORAL DAILY
Qty: 30 TABLET | Refills: 5 | Status: SHIPPED | OUTPATIENT
Start: 2020-07-21 | End: 2020-11-09 | Stop reason: SDUPTHER

## 2020-07-21 NOTE — PROGRESS NOTES
Assessment/Plan:    1  Benign essential hypertension  Assessment & Plan:  Start lisinopril    Orders:  -     lisinopril (ZESTRIL) 10 mg tablet; Take 1 tablet (10 mg total) by mouth daily    2  IFG (impaired fasting glucose)  Assessment & Plan:  Refine diet and increase exercise when can      3  Severe obesity (BMI 35 0-39  9) with comorbidity ([address] )  Assessment & Plan:  Diet and exercise      4  Mixed hyperlipidemia  Assessment & Plan:  Improved but unable to tolerate statin              There are no Patient Instructions on file for this visit  No follow-ups on file  Subjective:      Patient ID: Liz More is a 48 y o  female  Chief Complaint   Patient presents with    Hypertension     Patient here with elevated blood pressure       Here for follow up  bp's have been increasing  Unable to tolerate statins  Walking  Hot days give her diarrhea      Hypertension   This is a chronic problem  The current episode started more than 1 year ago  The problem is uncontrolled  Associated symptoms include headaches  Pertinent negatives include no chest pain  There are no associated agents to hypertension  Risk factors for coronary artery disease include dyslipidemia and diabetes mellitus  Past treatments include beta blockers  The current treatment provides mild improvement  Compliance problems include exercise  Hyperlipidemia   This is a chronic problem  The current episode started more than 1 year ago  The problem is uncontrolled  Recent lipid tests were reviewed and are high  Exacerbating diseases include diabetes  Factors aggravating her hyperlipidemia include atypical antipsychotics  Pertinent negatives include no chest pain  Current antihyperlipidemic treatment includes fibric acid derivatives  The current treatment provides mild improvement of lipids  Risk factors for coronary artery disease include diabetes mellitus and hypertension         The following portions of the patient's history were reviewed and updated as appropriate: allergies, current medications, past family history, past medical history, past social history, past surgical history and problem list     Review of Systems   Constitutional: Negative  HENT: Negative  Eyes: Negative  Respiratory: Negative  Cardiovascular: Negative for chest pain  Endocrine: Negative  Genitourinary: Negative  Musculoskeletal: Negative  Skin: Negative  Allergic/Immunologic: Negative  Neurological: Positive for headaches  Hematological: Negative  Psychiatric/Behavioral: Negative  Current Outpatient Medications   Medication Sig Dispense Refill    buPROPion (WELLBUTRIN SR) 200 MG 12 hr tablet Take 1 tablet by mouth daily      FLUoxetine (PROzac) 40 MG capsule Take 1 capsule by mouth daily      gemfibrozil (LOPID) 600 mg tablet Take 1 tablet (600 mg total) by mouth 2 (two) times a day 60 tablet 5    labetalol (NORMODYNE) 100 mg tablet Take 1 tablet (100 mg total) by mouth every 12 (twelve) hours 60 tablet 5    LORazepam (ATIVAN) 0 5 mg tablet Take by mouth every 8 (eight) hours as needed for anxiety      Multiple Vitamins-Minerals (PRESERVISION AREDS 2) CAPS Take by mouth      Multiple Vitamins-Minerals (WOMENS 50+ MULTI VITAMIN/MIN) TABS Take by mouth      Omega-3 Fatty Acids (FISH OIL EXTRA STRENGTH PO) Take by mouth Taking 2 in the morning and 2 in the evening      VITAMIN D, ERGOCALCIFEROL, PO Take by mouth Taking 2,000 daily      lisinopril (ZESTRIL) 10 mg tablet Take 1 tablet (10 mg total) by mouth daily 30 tablet 5     No current facility-administered medications for this visit  Objective:    /90   Pulse 78   Temp (!) 97 1 °F (36 2 °C)   Resp 16   Ht 5' 6 58" (1 691 m)   Wt 104 kg (229 lb)   SpO2 97%   BMI 36 32 kg/m²        Physical Exam   Constitutional: She is oriented to person, place, and time  She appears well-developed and well-nourished  HENT:   Head: Normocephalic and atraumatic  Eyes: Pupils are equal, round, and reactive to light  EOM are normal    Neck: Normal range of motion  Neck supple  Cardiovascular: Normal rate, regular rhythm, normal heart sounds and intact distal pulses  Pulmonary/Chest: Effort normal and breath sounds normal    Abdominal: Soft  Musculoskeletal: Normal range of motion  Neurological: She is alert and oriented to person, place, and time  Skin: Skin is warm and dry  Capillary refill takes less than 2 seconds  Psychiatric: She has a normal mood and affect  Her behavior is normal  Judgment and thought content normal    Nursing note and vitals reviewed               Charity Ashton DO

## 2020-08-05 ENCOUNTER — TELEPHONE (OUTPATIENT)
Dept: FAMILY MEDICINE CLINIC | Facility: CLINIC | Age: 55
End: 2020-08-05

## 2020-08-05 ENCOUNTER — OFFICE VISIT (OUTPATIENT)
Dept: FAMILY MEDICINE CLINIC | Facility: CLINIC | Age: 55
End: 2020-08-05

## 2020-08-05 VITALS — SYSTOLIC BLOOD PRESSURE: 112 MMHG | TEMPERATURE: 96.2 F | DIASTOLIC BLOOD PRESSURE: 76 MMHG

## 2020-08-05 DIAGNOSIS — I10 ESSENTIAL HYPERTENSION: Primary | ICD-10-CM

## 2020-08-05 PROCEDURE — RECHECK: Performed by: FAMILY MEDICINE

## 2020-08-05 NOTE — TELEPHONE ENCOUNTER
Patient was in for a BP check  Left arm: 112/76 Large cuff    Patient currently taking   lisinopril (ZESTRIL) 10 mg tablet at bedtime    States that she is experiencing headaches/dizziness/fatigue/hard to catch her breath/ and chest pressure  Please advise what can be done      Pharmacy: [facility]

## 2020-08-05 NOTE — PROGRESS NOTES
Assessment/Plan:    No problem-specific Assessment & Plan notes found for this encounter  Diagnoses and all orders for this visit:    Essential hypertension          Subjective:      Patient ID: Rachael Mcmullen is a 48 y o  female      HPI    Patient is here for BP check    Review of Systems      Objective:      /76 (BP Location: Left arm, Patient Position: Sitting, Cuff Size: Large)   Temp (!) 96 2 °F (35 7 °C) (Tympanic)          Physical Exam

## 2020-08-05 NOTE — TELEPHONE ENCOUNTER
Her bp pressure is much better so less likely that symptoms are related to that  Is she having continued chest pain, then we need to rule out cardiac issues   If currently having chest pain, she should go to the ER for eval  Otherwise we should have her see cardiology for eval

## 2020-08-10 DIAGNOSIS — E78.1 ESSENTIAL HYPERTRIGLYCERIDEMIA: ICD-10-CM

## 2020-08-10 DIAGNOSIS — I10 BENIGN ESSENTIAL HYPERTENSION: ICD-10-CM

## 2020-08-10 RX ORDER — GEMFIBROZIL 600 MG/1
TABLET, FILM COATED ORAL
Qty: 60 TABLET | Refills: 5 | Status: SHIPPED | OUTPATIENT
Start: 2020-08-10 | End: 2021-02-03 | Stop reason: SDUPTHER

## 2020-08-10 RX ORDER — LABETALOL 100 MG/1
TABLET, FILM COATED ORAL
Qty: 60 TABLET | Refills: 5 | Status: SHIPPED | OUTPATIENT
Start: 2020-08-10 | End: 2021-02-03 | Stop reason: SDUPTHER

## 2020-11-09 DIAGNOSIS — I10 BENIGN ESSENTIAL HYPERTENSION: ICD-10-CM

## 2020-11-09 RX ORDER — LISINOPRIL 10 MG/1
10 TABLET ORAL DAILY
Qty: 90 TABLET | Refills: 3 | Status: SHIPPED | OUTPATIENT
Start: 2020-11-09 | End: 2021-02-03 | Stop reason: SDUPTHER

## 2020-11-17 ENCOUNTER — OFFICE VISIT (OUTPATIENT)
Dept: FAMILY MEDICINE CLINIC | Facility: CLINIC | Age: 55
End: 2020-11-17
Payer: MEDICARE

## 2020-11-17 VITALS
RESPIRATION RATE: 16 BRPM | BODY MASS INDEX: 36.1 KG/M2 | SYSTOLIC BLOOD PRESSURE: 124 MMHG | DIASTOLIC BLOOD PRESSURE: 80 MMHG | HEART RATE: 95 BPM | OXYGEN SATURATION: 96 % | HEIGHT: 67 IN | WEIGHT: 230 LBS | TEMPERATURE: 98.5 F

## 2020-11-17 DIAGNOSIS — Z12.31 OTHER SCREENING MAMMOGRAM: ICD-10-CM

## 2020-11-17 DIAGNOSIS — R10.11 RUQ ABDOMINAL PAIN: ICD-10-CM

## 2020-11-17 DIAGNOSIS — Z12.31 ENCOUNTER FOR SCREENING MAMMOGRAM FOR MALIGNANT NEOPLASM OF BREAST: ICD-10-CM

## 2020-11-17 DIAGNOSIS — E78.2 MIXED HYPERLIPIDEMIA: Primary | ICD-10-CM

## 2020-11-17 DIAGNOSIS — Z23 NEED FOR VACCINATION: ICD-10-CM

## 2020-11-17 DIAGNOSIS — Z12.31 ENCOUNTER FOR SCREENING MAMMOGRAM FOR MALIGNANT NEOPLASM OF BREAST: Primary | ICD-10-CM

## 2020-11-17 DIAGNOSIS — I10 BENIGN ESSENTIAL HYPERTENSION: ICD-10-CM

## 2020-11-17 PROCEDURE — 99214 OFFICE O/P EST MOD 30 MIN: CPT | Performed by: FAMILY MEDICINE

## 2020-11-17 PROCEDURE — 90682 RIV4 VACC RECOMBINANT DNA IM: CPT

## 2020-11-17 PROCEDURE — G0008 ADMIN INFLUENZA VIRUS VAC: HCPCS

## 2021-01-14 ENCOUNTER — HOSPITAL ENCOUNTER (OUTPATIENT)
Dept: ULTRASOUND IMAGING | Facility: HOSPITAL | Age: 56
Discharge: HOME | End: 2021-01-14
Payer: MEDICARE

## 2021-01-14 ENCOUNTER — TELEPHONE (OUTPATIENT)
Dept: FAMILY MEDICINE CLINIC | Facility: CLINIC | Age: 56
End: 2021-01-14

## 2021-01-14 DIAGNOSIS — R10.11 RUQ ABDOMINAL PAIN: ICD-10-CM

## 2021-01-14 PROCEDURE — 76705 ECHO EXAM OF ABDOMEN: CPT

## 2021-01-14 NOTE — TELEPHONE ENCOUNTER
Everyone orders their own labs under their own specialty with their own diagnosis codes   She can just take his lab slip to the [facility] lab and they will run both sets of labs

## 2021-01-14 NOTE — TELEPHONE ENCOUNTER
PT had a appt  With psych  He would like to order BW for her and she asked if you can order it, I stated he should be here is the list he wants order   Her lab is [facility]:  Please advise and thank you    Your order:  Amylase  Lipase  CMP  Lipid    Pysch   Cbc  TSH  Vit D  Iron Study  A1C  B12   Methomaclomic Acid  Folia Acid

## 2021-01-15 ENCOUNTER — LAB (OUTPATIENT)
Dept: LAB | Facility: AMBULARY SURGERY CENTER | Age: 56
End: 2021-01-15
Payer: MEDICARE

## 2021-01-15 DIAGNOSIS — I10 BENIGN ESSENTIAL HYPERTENSION: ICD-10-CM

## 2021-01-15 DIAGNOSIS — R10.11 RUQ ABDOMINAL PAIN: ICD-10-CM

## 2021-01-15 DIAGNOSIS — E78.2 MIXED HYPERLIPIDEMIA: ICD-10-CM

## 2021-01-15 LAB
ALBUMIN SERPL BCP-MCNC: 3.7 G/DL (ref 3.5–5)
ALP SERPL-CCNC: 51 U/L (ref 46–116)
ALT SERPL W P-5'-P-CCNC: 33 U/L (ref 12–78)
AMYLASE SERPL-CCNC: 44 IU/L (ref 25–115)
ANION GAP SERPL CALCULATED.3IONS-SCNC: 4 MMOL/L (ref 4–13)
AST SERPL W P-5'-P-CCNC: 17 U/L (ref 5–45)
BILIRUB SERPL-MCNC: 0.38 MG/DL (ref 0.2–1)
BUN SERPL-MCNC: 12 MG/DL (ref 5–25)
CALCIUM SERPL-MCNC: 9.6 MG/DL (ref 8.3–10.1)
CHLORIDE SERPL-SCNC: 111 MMOL/L (ref 100–108)
CHOLEST SERPL-MCNC: 213 MG/DL (ref 50–200)
CO2 SERPL-SCNC: 26 MMOL/L (ref 21–32)
CREAT SERPL-MCNC: 0.77 MG/DL (ref 0.6–1.3)
GFR SERPL CREATININE-BSD FRML MDRD: 88 ML/MIN/1.73SQ M
GLUCOSE P FAST SERPL-MCNC: 96 MG/DL (ref 65–99)
HDLC SERPL-MCNC: 41 MG/DL
LDLC SERPL CALC-MCNC: 137 MG/DL (ref 0–100)
LIPASE SERPL-CCNC: 107 U/L (ref 73–393)
POTASSIUM SERPL-SCNC: 4.2 MMOL/L (ref 3.5–5.3)
PROT SERPL-MCNC: 7.1 G/DL (ref 6.4–8.2)
SODIUM SERPL-SCNC: 141 MMOL/L (ref 136–145)
TRIGL SERPL-MCNC: 177 MG/DL

## 2021-01-15 PROCEDURE — 82150 ASSAY OF AMYLASE: CPT

## 2021-01-15 PROCEDURE — 36415 COLL VENOUS BLD VENIPUNCTURE: CPT

## 2021-01-15 PROCEDURE — 80061 LIPID PANEL: CPT

## 2021-01-15 PROCEDURE — 80053 COMPREHEN METABOLIC PANEL: CPT

## 2021-01-15 PROCEDURE — 83690 ASSAY OF LIPASE: CPT

## 2021-02-03 DIAGNOSIS — E78.1 ESSENTIAL HYPERTRIGLYCERIDEMIA: ICD-10-CM

## 2021-02-03 DIAGNOSIS — I10 BENIGN ESSENTIAL HYPERTENSION: ICD-10-CM

## 2021-02-03 RX ORDER — LISINOPRIL 10 MG/1
10 TABLET ORAL DAILY
Qty: 90 TABLET | Refills: 3 | Status: SHIPPED | OUTPATIENT
Start: 2021-02-03 | End: 2022-02-04

## 2021-02-03 RX ORDER — GEMFIBROZIL 600 MG/1
600 TABLET, FILM COATED ORAL 2 TIMES DAILY
Qty: 60 TABLET | Refills: 5 | Status: SHIPPED | OUTPATIENT
Start: 2021-02-03 | End: 2021-06-03 | Stop reason: SDUPTHER

## 2021-02-03 RX ORDER — LABETALOL 100 MG/1
100 TABLET, FILM COATED ORAL EVERY 12 HOURS
Qty: 60 TABLET | Refills: 5 | Status: SHIPPED | OUTPATIENT
Start: 2021-02-03 | End: 2021-06-03 | Stop reason: SDUPTHER

## 2021-03-01 ENCOUNTER — APPOINTMENT (OUTPATIENT)
Dept: LAB | Facility: AMBULARY SURGERY CENTER | Age: 56
End: 2021-03-01
Payer: MEDICARE

## 2021-03-01 ENCOUNTER — TRANSCRIBE ORDERS (OUTPATIENT)
Dept: LAB | Facility: AMBULARY SURGERY CENTER | Age: 56
End: 2021-03-01

## 2021-03-01 DIAGNOSIS — I51.9 MYXEDEMA HEART DISEASE: ICD-10-CM

## 2021-03-01 DIAGNOSIS — D50.9 IRON DEFICIENCY ANEMIA, UNSPECIFIED IRON DEFICIENCY ANEMIA TYPE: ICD-10-CM

## 2021-03-01 DIAGNOSIS — Z51.81 ENCOUNTER FOR THERAPEUTIC DRUG MONITORING: ICD-10-CM

## 2021-03-01 DIAGNOSIS — E55.9 AVITAMINOSIS D: ICD-10-CM

## 2021-03-01 DIAGNOSIS — Z79.899 ENCOUNTER FOR LONG-TERM (CURRENT) USE OF OTHER MEDICATIONS: ICD-10-CM

## 2021-03-01 DIAGNOSIS — E03.9 MYXEDEMA HEART DISEASE: ICD-10-CM

## 2021-03-01 DIAGNOSIS — R73.09 IMPAIRED GLUCOSE TOLERANCE TEST: ICD-10-CM

## 2021-03-01 DIAGNOSIS — D51.9 ANEMIA DUE TO VITAMIN B12 DEFICIENCY, UNSPECIFIED B12 DEFICIENCY TYPE: ICD-10-CM

## 2021-03-01 DIAGNOSIS — Z51.81 ENCOUNTER FOR THERAPEUTIC DRUG MONITORING: Primary | ICD-10-CM

## 2021-03-01 LAB
25(OH)D3 SERPL-MCNC: 38.7 NG/ML (ref 30–100)
BASOPHILS # BLD AUTO: 0.09 THOUSANDS/ΜL (ref 0–0.1)
BASOPHILS NFR BLD AUTO: 2 % (ref 0–1)
EOSINOPHIL # BLD AUTO: 0.17 THOUSAND/ΜL (ref 0–0.61)
EOSINOPHIL NFR BLD AUTO: 3 % (ref 0–6)
ERYTHROCYTE [DISTWIDTH] IN BLOOD BY AUTOMATED COUNT: 12.2 % (ref 11.6–15.1)
EST. AVERAGE GLUCOSE BLD GHB EST-MCNC: 111 MG/DL
FERRITIN SERPL-MCNC: 75 NG/ML (ref 8–388)
HBA1C MFR BLD: 5.5 %
HCT VFR BLD AUTO: 45 % (ref 34.8–46.1)
HGB BLD-MCNC: 14.9 G/DL (ref 11.5–15.4)
IMM GRANULOCYTES # BLD AUTO: 0.01 THOUSAND/UL (ref 0–0.2)
IMM GRANULOCYTES NFR BLD AUTO: 0 % (ref 0–2)
LYMPHOCYTES # BLD AUTO: 1.65 THOUSANDS/ΜL (ref 0.6–4.47)
LYMPHOCYTES NFR BLD AUTO: 28 % (ref 14–44)
MCH RBC QN AUTO: 29.6 PG (ref 26.8–34.3)
MCHC RBC AUTO-ENTMCNC: 33.1 G/DL (ref 31.4–37.4)
MCV RBC AUTO: 89 FL (ref 82–98)
MONOCYTES # BLD AUTO: 0.33 THOUSAND/ΜL (ref 0.17–1.22)
MONOCYTES NFR BLD AUTO: 6 % (ref 4–12)
NEUTROPHILS # BLD AUTO: 3.57 THOUSANDS/ΜL (ref 1.85–7.62)
NEUTS SEG NFR BLD AUTO: 61 % (ref 43–75)
NRBC BLD AUTO-RTO: 0 /100 WBCS
PLATELET # BLD AUTO: 415 THOUSANDS/UL (ref 149–390)
PMV BLD AUTO: 10.8 FL (ref 8.9–12.7)
RBC # BLD AUTO: 5.04 MILLION/UL (ref 3.81–5.12)
TSH SERPL DL<=0.05 MIU/L-ACNC: 1.11 UIU/ML (ref 0.36–3.74)
WBC # BLD AUTO: 5.82 THOUSAND/UL (ref 4.31–10.16)

## 2021-03-01 PROCEDURE — 82607 VITAMIN B-12: CPT

## 2021-03-01 PROCEDURE — 82306 VITAMIN D 25 HYDROXY: CPT

## 2021-03-01 PROCEDURE — 83036 HEMOGLOBIN GLYCOSYLATED A1C: CPT

## 2021-03-01 PROCEDURE — 80346 BENZODIAZEPINES1-12: CPT

## 2021-03-01 PROCEDURE — 85025 COMPLETE CBC W/AUTO DIFF WBC: CPT

## 2021-03-01 PROCEDURE — 84443 ASSAY THYROID STIM HORMONE: CPT

## 2021-03-01 PROCEDURE — G0480 DRUG TEST DEF 1-7 CLASSES: HCPCS

## 2021-03-01 PROCEDURE — 82728 ASSAY OF FERRITIN: CPT

## 2021-03-01 PROCEDURE — 36415 COLL VENOUS BLD VENIPUNCTURE: CPT

## 2021-03-03 LAB — VIT B12 SERPL-MCNC: 758 PG/ML (ref 100–900)

## 2021-03-10 DIAGNOSIS — Z23 ENCOUNTER FOR IMMUNIZATION: ICD-10-CM

## 2021-03-15 LAB — MISCELLANEOUS LAB TEST RESULT: NORMAL

## 2021-03-30 ENCOUNTER — IMMUNIZATIONS (OUTPATIENT)
Dept: FAMILY MEDICINE CLINIC | Facility: HOSPITAL | Age: 56
End: 2021-03-30

## 2021-03-30 DIAGNOSIS — Z23 ENCOUNTER FOR IMMUNIZATION: Primary | ICD-10-CM

## 2021-03-30 PROCEDURE — 91300 SARS-COV-2 / COVID-19 MRNA VACCINE (PFIZER-BIONTECH) 30 MCG: CPT

## 2021-03-30 PROCEDURE — 0001A SARS-COV-2 / COVID-19 MRNA VACCINE (PFIZER-BIONTECH) 30 MCG: CPT

## 2021-04-08 ENCOUNTER — HOSPITAL ENCOUNTER (OUTPATIENT)
Dept: MAMMOGRAPHY | Facility: MEDICAL CENTER | Age: 56
Discharge: HOME | End: 2021-04-08
Payer: MEDICARE

## 2021-04-08 VITALS — WEIGHT: 230 LBS | BODY MASS INDEX: 36.1 KG/M2 | HEIGHT: 67 IN

## 2021-04-08 DIAGNOSIS — Z12.31 ENCOUNTER FOR SCREENING MAMMOGRAM FOR MALIGNANT NEOPLASM OF BREAST: ICD-10-CM

## 2021-04-08 PROCEDURE — 77063 BREAST TOMOSYNTHESIS BI: CPT

## 2021-04-08 PROCEDURE — 77067 SCR MAMMO BI INCL CAD: CPT

## 2021-04-15 ENCOUNTER — OFFICE VISIT (OUTPATIENT)
Dept: CARDIOLOGY CLINIC | Facility: CLINIC | Age: 56
End: 2021-04-15
Payer: MEDICARE

## 2021-04-15 VITALS
WEIGHT: 233 LBS | HEIGHT: 67 IN | SYSTOLIC BLOOD PRESSURE: 100 MMHG | BODY MASS INDEX: 36.57 KG/M2 | DIASTOLIC BLOOD PRESSURE: 68 MMHG | HEART RATE: 97 BPM

## 2021-04-15 DIAGNOSIS — R07.1 CHEST PAIN ON BREATHING: ICD-10-CM

## 2021-04-15 DIAGNOSIS — E78.2 MIXED HYPERLIPIDEMIA: ICD-10-CM

## 2021-04-15 DIAGNOSIS — I10 BENIGN ESSENTIAL HYPERTENSION: Primary | ICD-10-CM

## 2021-04-15 DIAGNOSIS — R06.02 SOB (SHORTNESS OF BREATH): ICD-10-CM

## 2021-04-15 DIAGNOSIS — R07.89 ATYPICAL CHEST PAIN: ICD-10-CM

## 2021-04-15 PROCEDURE — 93000 ELECTROCARDIOGRAM COMPLETE: CPT | Performed by: INTERNAL MEDICINE

## 2021-04-15 PROCEDURE — 99213 OFFICE O/P EST LOW 20 MIN: CPT | Performed by: INTERNAL MEDICINE

## 2021-04-15 NOTE — PROGRESS NOTES
Cardiology Follow Up    Morgan Bryson  1965  [id number]  [facility] [address]  [phone]  [phone]    1  Benign essential hypertension  POCT ECG   2  Chest pain on breathing  POCT ECG   3  SOB (shortness of breath)     4  Atypical chest pain     5  Mixed hyperlipidemia           Discussion/Summary: On review of her previous cardiac testing including her cardiac cath, I do not feel that her symptoms are related to myocardial ischemia  No further cardiac testing is recommended  I advised her to start a steady exercise program  I have reviewed her medications and made no changes  RTO as needed  Interval History: I reviewed her cardiac records and personally reviewed her cardiac cath pictures from 2018  She was evaluated in 2018 for CP and SOB  ECHO showed a normal EF with no valve problems  Nuclear stress test showed possible anterior ischemia  Cardiac cath showed widely patent coronary arteries  Her symptoms have persisted since that time  She has not been very active for at least the past year  She c/o exertional fatigue with SOB and occasional CP  ECG today is normal     BP is 100/68  Patient Active Problem List   Diagnosis    Hyperlipidemia    Anxiety state    Benign essential hypertension    Bipolar I disorder, single manic episode ([address] )    Gastroesophageal reflux disease    IFG (impaired fasting glucose)    Localized pruritus    Nonalcoholic fatty liver disease    Snoring    Vitamin D deficiency    Medicare annual wellness visit, subsequent    Screening for malignant neoplasm of breast    Encounter for immunization    Severe obesity (BMI 35 0-39  9) with comorbidity ([address] )    RUQ abdominal pain    Need for vaccination    SOB (shortness of breath)    Atypical chest pain     Past Medical History:   Diagnosis Date    Anxiety     [facility]     Depression     Hyperlipidemia     Hypertension     Iron deficiency anemia secondary to inadequate dietary iron intake 6/27/2012    Lyme disease     Otitis of left ear 2/10/2020    Psychiatric disorder     Shortness of breath on exertion     Vitamin D deficiency      Social History     Socioeconomic History    Marital status: /Civil Union     Spouse name: Not on file    Number of children: Not on file    Years of education: Not on file    Highest education level: Not on file   Occupational History    Not on file   Social Needs    Financial resource strain: Not on file    Food insecurity     Worry: Not on file     Inability: Not on file   [employer] needs     Medical: Not on file     Non-medical: Not on file   Tobacco Use    Smoking status: Former Smoker     Types: Cigarettes     Quit date: 12/23/2017     Years since quitting: 3 3    Smokeless tobacco: Never Used   Substance and Sexual Activity    Alcohol use: Yes     Comment: rare, few times per year    Drug use: No    Sexual activity: Yes     Partners: Male   Lifestyle    Physical activity     Days per week: Not on file     Minutes per session: Not on file    Stress: Not on file   Relationships    Social connections     Talks on phone: Not on file     Gets together: Not on file     Attends Orthodoxy service: Not on file     Active member of club or organization: Not on file     Attends meetings of clubs or organizations: Not on file     Relationship status: Not on file    Intimate partner violence     Fear of current or ex partner: Not on file     Emotionally abused: Not on file     Physically abused: Not on file     Forced sexual activity: Not on file   Other Topics Concern    Not on file   Social History Narrative    Not on file      Family History   Problem Relation Age of Onset    Dementia Mother     Hypertension Mother     Stroke Mother     Melanoma Mother 61    Anxiety disorder Father         NOS    Coronary artery disease Father     Diabetes Father         mellitus    Hyperlipidemia Father         pure hypercholesterolemia    Melanoma Father 61    Hyperlipidemia Sister     Lupus Sister     Diabetes Sister         mellitus    Diverticulitis Brother     Stroke Maternal Grandmother     Cancer Paternal Grandfather         unknown type    Thyroid disease Neg Hx      Past Surgical History:   Procedure Laterality Date    ADENOIDECTOMY      ENDOMETRIAL ABLATION      HERNIA REPAIR      TONSILLECTOMY      WISDOM TOOTH EXTRACTION         Current Outpatient Medications:     buPROPion (WELLBUTRIN SR) 200 MG 12 hr tablet, Take 1 tablet by mouth daily, Disp: , Rfl:     FLUoxetine (PROzac) 40 MG capsule, Take 1 capsule by mouth daily, Disp: , Rfl:     gemfibrozil (LOPID) 600 mg tablet, Take 1 tablet (600 mg total) by mouth 2 (two) times a day, Disp: 60 tablet, Rfl: 5    labetalol (NORMODYNE) 100 mg tablet, Take 1 tablet (100 mg total) by mouth every 12 (twelve) hours, Disp: 60 tablet, Rfl: 5    lisinopril (ZESTRIL) 10 mg tablet, Take 1 tablet (10 mg total) by mouth daily, Disp: 90 tablet, Rfl: 3    LORazepam (ATIVAN) 0 5 mg tablet, Take by mouth every 8 (eight) hours as needed for anxiety, Disp: , Rfl:     Multiple Vitamins-Minerals (WOMENS 50+ MULTI VITAMIN/MIN) TABS, Take by mouth, Disp: , Rfl:     VITAMIN D, ERGOCALCIFEROL, PO, Take by mouth Taking 2,000 daily, Disp: , Rfl:     Multiple Vitamins-Minerals (PRESERVISION AREDS 2) CAPS, Take by mouth, Disp: , Rfl:     Omega-3 Fatty Acids (FISH OIL EXTRA STRENGTH PO), Take by mouth Taking 2 in the morning and 2 in the evening, Disp: , Rfl:   Allergies   Allergen Reactions    Atorvastatin Confusion    Penicillins Swelling and Rash     Vitals:    04/15/21 0822   BP: 100/68   BP Location: Right arm   Cuff Size: Large   Pulse: 97   Weight: 106 kg (233 lb)   Height: 5' 6 5" (1 689 m)     Weight (last 2 days)     Date/Time   Weight    04/15/21 0822   106 (233)             Blood pressure 100/68, pulse 97, height 5' 6 5" (1 689 m), weight 106 kg (233 lb), not currently breastfeeding , Body mass index is 37 04 kg/m²      Labs:  Appointment on 03/01/2021   Component Date Value    WBC 03/01/2021 5 82     RBC 03/01/2021 5 04     Hemoglobin 03/01/2021 14 9     Hematocrit 03/01/2021 45 0     MCV 03/01/2021 89     MCH 03/01/2021 29 6     MCHC 03/01/2021 33 1     RDW 03/01/2021 12 2     MPV 03/01/2021 10 8     Platelets 50/47/3755 415*    nRBC 03/01/2021 0     Neutrophils Relative 03/01/2021 61     Immat GRANS % 03/01/2021 0     Lymphocytes Relative 03/01/2021 28     Monocytes Relative 03/01/2021 6     Eosinophils Relative 03/01/2021 3     Basophils Relative 03/01/2021 2*    Neutrophils Absolute 03/01/2021 3 57     Immature Grans Absolute 03/01/2021 0 01     Lymphocytes Absolute 03/01/2021 1 65     Monocytes Absolute 03/01/2021 0 33     Eosinophils Absolute 03/01/2021 0 17     Basophils Absolute 03/01/2021 0 09     TSH 3RD GENERATON 03/01/2021 1 110     Vit D, 25-Hydroxy 03/01/2021 38 7     Ferritin 03/01/2021 75     Hemoglobin A1C 03/01/2021 5 5     EAG 03/01/2021 111     Vitamin B-12 03/01/2021 758     Miscellaneous Lab Test R* 03/01/2021 SEE WRITTEN REPORT    Lab on 01/15/2021   Component Date Value    Cholesterol 01/15/2021 213*    Triglycerides 01/15/2021 177*    HDL, Direct 01/15/2021 41     LDL Calculated 01/15/2021 137*    Sodium 01/15/2021 141     Potassium 01/15/2021 4 2     Chloride 01/15/2021 111*    CO2 01/15/2021 26     ANION GAP 01/15/2021 4     BUN 01/15/2021 12     Creatinine 01/15/2021 0 77     Glucose, Fasting 01/15/2021 96     Calcium 01/15/2021 9 6     AST 01/15/2021 17     ALT 01/15/2021 33     Alkaline Phosphatase 01/15/2021 51     Total Protein 01/15/2021 7 1     Albumin 01/15/2021 3 7     Total Bilirubin 01/15/2021 0 38     eGFR 01/15/2021 88     Amylase 01/15/2021 44     Lipase 01/15/2021 107 Imaging: Mammo Screening Bilateral W 3d & Cad    Result Date: 4/8/2021  Narrative: DIAGNOSIS: Encounter for screening mammogram for malignant neoplasm of breast TECHNIQUE: Digital screening mammography was performed  Computer Aided Detection (CAD) analyzed all applicable images  COMPARISONS: Prior breast imaging dated: 04/15/2019, 06/08/2012, 06/06/2012, and 02/19/2010 RELEVANT HISTORY: Family Breast Cancer History: No known family history of breast cancer  Family Medical History: No known relevant family medical history  Personal History: No known relevant hormone history  No known relevant surgical history  No known relevant medical history  The patient is scheduled in a reminder system for screening mammography  8-10% of cancers will be missed on mammography  Management of a palpable abnormality must be based on clinical grounds  Patients will be notified of their results via letter from our facility  Accredited by [employer] of Radiology and FDA  RISK ASSESSMENT: 5 Year Tyrer-Cuzick: 1 33 % 10 Year Tyrer-Cuzick: 2 93 % Lifetime Tyrer-Cuzick: 9 75 % TISSUE DENSITY: There are scattered areas of fibroglandular density  INDICATION: Master Chavez is a 55 y o  female presenting for screening mammography  FINDINGS: There are no suspicious masses, grouped microcalcifications or areas of architectural distortion  The skin and nipple areolar complex are unremarkable  Impression: No mammographic evidence of malignancy  ASSESSMENT/BI-RADS CATEGORY: Left: 1 - Negative Right: 1 - Negative Overall: 1 - Negative RECOMMENDATION:      - Routine screening mammogram in 1 year for both breasts  Workstation ID: [id number]     Review of Systems:  Review of Systems   Constitutional: Negative for diaphoresis, fatigue, fever and unexpected weight change  HENT: Negative  Respiratory: Positive for shortness of breath  Negative for cough and wheezing  Cardiovascular: Positive for chest pain   Negative for palpitations and leg swelling  Gastrointestinal: Negative for abdominal pain, diarrhea and nausea  Musculoskeletal: Negative for gait problem and myalgias  Skin: Negative for rash  Neurological: Negative for dizziness and numbness  Psychiatric/Behavioral: Negative  Physical Exam:  Physical Exam  Constitutional:       Appearance: She is well-developed  HENT:      Head: Normocephalic and atraumatic  Eyes:      Pupils: Pupils are equal, round, and reactive to light  Neck:      Musculoskeletal: Normal range of motion and neck supple  Vascular: No JVD  Cardiovascular:      Rate and Rhythm: Regular rhythm  Pulses: Normal pulses  Carotid pulses are 2+ on the right side and 2+ on the left side  Heart sounds: S1 normal and S2 normal    Pulmonary:      Effort: Pulmonary effort is normal       Breath sounds: Normal breath sounds  No wheezing or rales  Abdominal:      General: Bowel sounds are normal       Palpations: Abdomen is soft  Tenderness: There is no abdominal tenderness  Musculoskeletal: Normal range of motion  General: No tenderness  Skin:     General: Skin is warm  Neurological:      Mental Status: She is alert and oriented to person, place, and time  Cranial Nerves: No cranial nerve deficit  Deep Tendon Reflexes: Reflexes are normal and symmetric

## 2021-04-22 ENCOUNTER — IMMUNIZATIONS (OUTPATIENT)
Dept: FAMILY MEDICINE CLINIC | Facility: HOSPITAL | Age: 56
End: 2021-04-22

## 2021-04-22 DIAGNOSIS — Z23 ENCOUNTER FOR IMMUNIZATION: Primary | ICD-10-CM

## 2021-04-22 PROCEDURE — 0002A SARS-COV-2 / COVID-19 MRNA VACCINE (PFIZER-BIONTECH) 30 MCG: CPT

## 2021-04-22 PROCEDURE — 91300 SARS-COV-2 / COVID-19 MRNA VACCINE (PFIZER-BIONTECH) 30 MCG: CPT

## 2021-06-03 ENCOUNTER — OFFICE VISIT (OUTPATIENT)
Dept: FAMILY MEDICINE CLINIC | Facility: CLINIC | Age: 56
End: 2021-06-03
Payer: MEDICARE

## 2021-06-03 VITALS
HEART RATE: 105 BPM | OXYGEN SATURATION: 97 % | RESPIRATION RATE: 16 BRPM | DIASTOLIC BLOOD PRESSURE: 80 MMHG | HEIGHT: 66 IN | SYSTOLIC BLOOD PRESSURE: 110 MMHG | BODY MASS INDEX: 37.61 KG/M2 | WEIGHT: 234 LBS

## 2021-06-03 DIAGNOSIS — F30.9 BIPOLAR I DISORDER, SINGLE MANIC EPISODE (HCC): ICD-10-CM

## 2021-06-03 DIAGNOSIS — E66.01 SEVERE OBESITY (BMI 35.0-39.9) WITH COMORBIDITY (HCC): ICD-10-CM

## 2021-06-03 DIAGNOSIS — I10 BENIGN ESSENTIAL HYPERTENSION: ICD-10-CM

## 2021-06-03 DIAGNOSIS — E78.1 ESSENTIAL HYPERTRIGLYCERIDEMIA: ICD-10-CM

## 2021-06-03 DIAGNOSIS — Z00.00 MEDICARE ANNUAL WELLNESS VISIT, SUBSEQUENT: Primary | ICD-10-CM

## 2021-06-03 DIAGNOSIS — E78.2 MIXED HYPERLIPIDEMIA: ICD-10-CM

## 2021-06-03 PROBLEM — R10.11 RUQ ABDOMINAL PAIN: Status: RESOLVED | Noted: 2020-11-17 | Resolved: 2021-06-03

## 2021-06-03 PROCEDURE — G0439 PPPS, SUBSEQ VISIT: HCPCS | Performed by: FAMILY MEDICINE

## 2021-06-03 RX ORDER — LABETALOL 100 MG/1
100 TABLET, FILM COATED ORAL EVERY 12 HOURS
Qty: 180 TABLET | Refills: 3 | Status: SHIPPED | OUTPATIENT
Start: 2021-06-03 | End: 2021-08-02

## 2021-06-03 RX ORDER — CLONAZEPAM 0.5 MG/1
TABLET ORAL
COMMUNITY
Start: 2001-09-03

## 2021-06-03 RX ORDER — GEMFIBROZIL 600 MG/1
600 TABLET, FILM COATED ORAL 2 TIMES DAILY
Qty: 180 TABLET | Refills: 3 | Status: SHIPPED | OUTPATIENT
Start: 2021-06-03 | End: 2022-07-30

## 2021-06-03 NOTE — PROGRESS NOTES
Assessment and Plan:     Problem List Items Addressed This Visit        Cardiovascular and Mediastinum    Benign essential hypertension    Relevant Medications    labetalol (NORMODYNE) 100 mg tablet       Other    Hyperlipidemia    Relevant Medications    gemfibrozil (LOPID) 600 mg tablet    Other Relevant Orders    Comprehensive metabolic panel    Lipid Panel with Direct LDL reflex    TSH, 3rd generation with Free T4 reflex    Bipolar I disorder, single manic episode ([facility] )     Stable on current meds         Medicare annual wellness visit, subsequent - Primary     Mammogram up to date  Labs order         Severe obesity (BMI 35 0-39  9) with comorbidity ([facility] )     Diet and exercise           Other Visit Diagnoses     Essential hypertriglyceridemia        Relevant Medications    gemfibrozil (LOPID) 600 mg tablet        BMI Counseling: Body mass index is 37 43 kg/m²  The BMI is above normal  Nutrition recommendations include encouraging healthy choices of fruits and vegetables  Exercise recommendations include exercising 3-5 times per week  No pharmacotherapy was ordered  Preventive health issues were discussed with patient, and age appropriate screening tests were ordered as noted in patient's After Visit Summary  Personalized health advice and appropriate referrals for health education or preventive services given if needed, as noted in patient's After Visit Summary       History of Present Illness:     Patient presents for Medicare Annual Wellness visit    Patient Care Team:  Ashley Birch DO as PCP - General  DO Ren Forrester MD Payton Cisco, MD (Psychiatry)     Problem List:     Patient Active Problem List   Diagnosis    Hyperlipidemia    Anxiety state    Benign essential hypertension    [facility]    Gastroesophageal reflux disease    IFG (impaired fasting glucose)    Localized pruritus    Nonalcoholic fatty liver disease    Snoring    Vitamin D deficiency    Medicare annual wellness visit, subsequent    Screening for malignant neoplasm of breast    Encounter for immunization    Severe obesity (BMI 35 0-39  9) with comorbidity ([facility] )    Need for vaccination    SOB (shortness of breath)    Atypical chest pain      Past Medical and Surgical History:     Past Medical History:   Diagnosis Date    Anxiety     [facility]     Depression     Fatty liver     Glaucoma     Hyperlipidemia     Hypertension     Iron deficiency anemia secondary to inadequate dietary iron intake 6/27/2012    Lyme disease     Otitis of left ear 2/10/2020    Psychiatric disorder     Shortness of breath on exertion     Trouble in sleeping     Vitamin D deficiency      Past Surgical History:   Procedure Laterality Date    ADENOIDECTOMY      ENDOMETRIAL ABLATION      HERNIA REPAIR      TONSILLECTOMY      WISDOM TOOTH EXTRACTION        Family History:     Family History   Problem Relation Age of Onset    Dementia Mother     Hypertension Mother     Stroke Mother     Melanoma Mother 61    Anxiety disorder Father         NOS    Coronary artery disease Father     Diabetes Father         mellitus    Hyperlipidemia Father         pure hypercholesterolemia    Melanoma Father 61    Hyperlipidemia Sister     Lupus Sister     Diabetes Sister         mellitus    Diverticulitis Brother     Stroke Maternal Grandmother     Cancer Paternal Grandfather         unknown type    Thyroid disease Neg Hx       Social History:        Social History     Socioeconomic History    Marital status: /Civil Union     Spouse name: None    Number of children: None    Years of education: None    Highest education level: None   Occupational History    None   Social Needs    Financial resource strain: None    Food insecurity     Worry: None     Inability: None    Transportation needs     Medical: None     Non-medical: None   Tobacco Use    Smoking status: Former Smoker     Types: Cigarettes     Quit date: 12/23/2017     Years since quitting: 3 4    Smokeless tobacco: Never Used   Substance and Sexual Activity    Alcohol use: Yes     Comment: rare, few times per year    Drug use: No    Sexual activity: Yes     Partners: Male   Lifestyle    Physical activity     Days per week: None     Minutes per session: None    Stress: None   Relationships    Social connections     Talks on phone: None     Gets together: None     Attends Muslim service: None     Active member of club or organization: None     Attends meetings of clubs or organizations: None     Relationship status: None    Intimate partner violence     Fear of current or ex partner: None     Emotionally abused: None     Physically abused: None     Forced sexual activity: None   Other Topics Concern    None   Social History Narrative    None      Medications and Allergies:     Current Outpatient Medications   Medication Sig Dispense Refill    buPROPion (WELLBUTRIN SR) 200 MG 12 hr tablet Take 1 tablet by mouth daily      clonazePAM (KlonoPIN) 0 5 mg tablet       FLUoxetine (PROzac) 40 MG capsule Take 1 capsule by mouth daily      gemfibrozil (LOPID) 600 mg tablet Take 1 tablet (600 mg total) by mouth 2 (two) times a day 180 tablet 3    labetalol (NORMODYNE) 100 mg tablet Take 1 tablet (100 mg total) by mouth every 12 (twelve) hours 180 tablet 3    lisinopril (ZESTRIL) 10 mg tablet Take 1 tablet (10 mg total) by mouth daily 90 tablet 3    LORazepam (ATIVAN) 0 5 mg tablet Take by mouth every 8 (eight) hours as needed for anxiety      Multiple Vitamins-Minerals (WOMENS 50+ MULTI VITAMIN/MIN) TABS Take by mouth      VITAMIN D, ERGOCALCIFEROL, PO Take by mouth Taking 2,000 daily      Omega-3 Fatty Acids (FISH OIL EXTRA STRENGTH PO) Take by mouth Taking 2 in the morning and 2 in the evening       No current facility-administered medications for this visit        Allergies   Allergen Reactions  Atorvastatin Confusion    Penicillins Swelling and Rash      Immunizations:     Immunization History   Administered Date(s) Administered    INFLUENZA 10/16/2014, 02/23/2017, 12/10/2018    Influenza Quadrivalent Preservative Free 3 years and older IM 10/16/2014, 02/23/2017    Influenza, injectable, quadrivalent, preservative free 0 5 mL 12/10/2018    Influenza, recombinant, quadrivalent,injectable, preservative free 02/17/2020, 11/17/2020    SARS-CoV-2 / COVID-19 mRNA IM ([employer]) 03/30/2021, 04/22/2021      Health Maintenance:         Topic Date Due    HIV Screening  Never done    Cervical Cancer Screening  04/05/2022    MAMMOGRAM  04/08/2022    Colorectal Cancer Screening  11/06/2024     There are no preventive care reminders to display for this patient  Medicare Health Risk Assessment:     /80   Pulse 105   Resp 16   Ht 5' 6 3" (1 684 m)   Wt 106 kg (234 lb)   SpO2 97%   BMI 37 43 kg/m²      Comfort Munroe is here for her Subsequent Wellness visit  Last Medicare Wellness visit information reviewed, patient interviewed and updates made to the record today  Health Risk Assessment:   Patient rates overall health as fair  Patient feels that their physical health rating is same  Patient is very dissatisfied with their life  Eyesight was rated as slightly worse  Hearing was rated as same  Patient feels that their emotional and mental health rating is slightly better  Patients states they are never, rarely angry  Patient states they are often unusually tired/fatigued  Pain experienced in the last 7 days has been some  Patient's pain rating has been 4/10  Patient states that she has experienced no weight loss or gain in last 6 months  Depression Screening:   PHQ-2 Score: 5  PHQ-9 Score: 17      Fall Risk Screening:    In the past year, patient has experienced: history of falling in past year    Number of falls: 1  Injured during fall?: Yes    Feels unsteady when standing or walking?: No    Worried about falling?: No      Urinary Incontinence Screening:   Patient has not leaked urine accidently in the last six months  Home Safety:  Patient does not have trouble with stairs inside or outside of their home  Patient has working smoke alarms and has no working carbon monoxide detector  Home safety hazards include: none  Nutrition:   Current diet is Regular  Medications:   Patient is currently taking over-the-counter supplements  OTC medications include: see medication list  Patient is not able to manage medications  Activities of Daily Living (ADLs)/Instrumental Activities of Daily Living (IADLs):   Walk and transfer into and out of bed and chair?: Yes  Dress and groom yourself?: Yes    Bathe or shower yourself?: Yes    Feed yourself? Yes  Do your laundry/housekeeping?: Yes  Manage your money, pay your bills and track your expenses?: Yes  Make your own meals?: Yes    Do your own shopping?: Yes    Previous Hospitalizations:   Any hospitalizations or ED visits within the last 12 months?: No      Advance Care Planning:   Living will: No    Durable POA for healthcare: No    Advanced directive: No      PREVENTIVE SCREENINGS      Cardiovascular Screening:    General: Screening Not Indicated and History Lipid Disorder      Diabetes Screening:     General: Screening Current      Colorectal Cancer Screening:     General: Screening Current      Breast Cancer Screening:     General: Screening Current      Cervical Cancer Screening:    General: Screening Current      Lung Cancer Screening:     General: Screening Not Indicated    Screening, Brief Intervention, and Referral to Treatment (SBIRT)    Screening  Typical number of drinks in a day: 0  Typical number of drinks in a week: 0  Interpretation: Low risk drinking behavior        Kary Giraldo DO

## 2021-07-31 DIAGNOSIS — I10 BENIGN ESSENTIAL HYPERTENSION: ICD-10-CM

## 2021-08-02 RX ORDER — LABETALOL 100 MG/1
TABLET, FILM COATED ORAL
Qty: 60 TABLET | Refills: 0 | Status: SHIPPED | OUTPATIENT
Start: 2021-08-02 | End: 2022-07-31

## 2021-10-29 ENCOUNTER — APPOINTMENT (OUTPATIENT)
Dept: LAB | Facility: HOSPITAL | Age: 56
End: 2021-10-29
Payer: MEDICARE

## 2021-10-29 DIAGNOSIS — E78.2 MIXED HYPERLIPIDEMIA: ICD-10-CM

## 2021-10-29 LAB
ALBUMIN SERPL BCP-MCNC: 4.2 G/DL (ref 3.4–4.8)
ALP SERPL-CCNC: 55.1 U/L (ref 35–140)
ALT SERPL W P-5'-P-CCNC: 23 U/L (ref 5–54)
ANION GAP SERPL CALCULATED.3IONS-SCNC: 11 MMOL/L (ref 4–13)
AST SERPL W P-5'-P-CCNC: 15 U/L (ref 15–41)
BILIRUB SERPL-MCNC: 0.45 MG/DL (ref 0.3–1.2)
BUN SERPL-MCNC: 18 MG/DL (ref 6–20)
CALCIUM SERPL-MCNC: 9.4 MG/DL (ref 8.4–10.2)
CHLORIDE SERPL-SCNC: 105 MMOL/L (ref 96–108)
CHOLEST SERPL-MCNC: 233 MG/DL
CO2 SERPL-SCNC: 23 MMOL/L (ref 22–33)
CREAT SERPL-MCNC: 1.05 MG/DL (ref 0.4–1.1)
GFR SERPL CREATININE-BSD FRML MDRD: 60 ML/MIN/1.73SQ M
GLUCOSE P FAST SERPL-MCNC: 121 MG/DL (ref 70–105)
HDLC SERPL-MCNC: 41 MG/DL
LDLC SERPL CALC-MCNC: 135 MG/DL (ref 0–100)
POTASSIUM SERPL-SCNC: 4.1 MMOL/L (ref 3.5–5)
PROT SERPL-MCNC: 6.7 G/DL (ref 6.4–8.3)
SODIUM SERPL-SCNC: 139 MMOL/L (ref 133–145)
TRIGL SERPL-MCNC: 286.9 MG/DL
TSH SERPL DL<=0.05 MIU/L-ACNC: 3.15 UIU/ML (ref 0.34–5.6)

## 2021-10-29 PROCEDURE — 80061 LIPID PANEL: CPT

## 2021-10-29 PROCEDURE — 36415 COLL VENOUS BLD VENIPUNCTURE: CPT

## 2021-10-29 PROCEDURE — 84443 ASSAY THYROID STIM HORMONE: CPT

## 2021-10-29 PROCEDURE — 80053 COMPREHEN METABOLIC PANEL: CPT

## 2021-11-01 ENCOUNTER — OFFICE VISIT (OUTPATIENT)
Dept: FAMILY MEDICINE CLINIC | Facility: CLINIC | Age: 56
End: 2021-11-01
Payer: MEDICARE

## 2021-11-01 VITALS
HEART RATE: 85 BPM | HEIGHT: 66 IN | BODY MASS INDEX: 36.45 KG/M2 | TEMPERATURE: 97 F | OXYGEN SATURATION: 97 % | DIASTOLIC BLOOD PRESSURE: 70 MMHG | RESPIRATION RATE: 16 BRPM | SYSTOLIC BLOOD PRESSURE: 110 MMHG | WEIGHT: 226.8 LBS

## 2021-11-01 DIAGNOSIS — F30.9 BIPOLAR I DISORDER, SINGLE MANIC EPISODE (HCC): ICD-10-CM

## 2021-11-01 DIAGNOSIS — R73.01 IFG (IMPAIRED FASTING GLUCOSE): Primary | ICD-10-CM

## 2021-11-01 DIAGNOSIS — I10 BENIGN ESSENTIAL HYPERTENSION: ICD-10-CM

## 2021-11-01 DIAGNOSIS — Z23 NEED FOR VACCINATION: ICD-10-CM

## 2021-11-01 DIAGNOSIS — E66.01 SEVERE OBESITY (BMI 35.0-39.9) WITH COMORBIDITY (HCC): ICD-10-CM

## 2021-11-01 DIAGNOSIS — E78.2 MIXED HYPERLIPIDEMIA: ICD-10-CM

## 2021-11-01 PROCEDURE — 99214 OFFICE O/P EST MOD 30 MIN: CPT | Performed by: FAMILY MEDICINE

## 2021-11-01 PROCEDURE — 90682 RIV4 VACC RECOMBINANT DNA IM: CPT

## 2021-11-01 PROCEDURE — G0008 ADMIN INFLUENZA VIRUS VAC: HCPCS

## 2021-11-01 RX ORDER — BIOTIN 10 MG
TABLET ORAL
COMMUNITY

## 2021-11-10 ENCOUNTER — APPOINTMENT (OUTPATIENT)
Dept: LAB | Facility: HOSPITAL | Age: 56
End: 2021-11-10
Payer: MEDICARE

## 2021-11-10 DIAGNOSIS — R73.01 IFG (IMPAIRED FASTING GLUCOSE): ICD-10-CM

## 2021-11-10 PROCEDURE — 36415 COLL VENOUS BLD VENIPUNCTURE: CPT

## 2021-11-10 PROCEDURE — 83036 HEMOGLOBIN GLYCOSYLATED A1C: CPT

## 2021-11-11 LAB
EST. AVERAGE GLUCOSE BLD GHB EST-MCNC: 117 MG/DL
HBA1C MFR BLD: 5.7 %

## 2021-12-02 DIAGNOSIS — R42 DIZZINESS: Primary | ICD-10-CM

## 2021-12-02 RX ORDER — MECLIZINE HYDROCHLORIDE 25 MG/1
25 TABLET ORAL 3 TIMES DAILY PRN
Qty: 30 TABLET | Refills: 0 | Status: SHIPPED | OUTPATIENT
Start: 2021-12-02

## 2021-12-17 ENCOUNTER — OFFICE VISIT (OUTPATIENT)
Dept: UROLOGY | Facility: AMBULATORY SURGERY CENTER | Age: 56
End: 2021-12-17
Payer: MEDICARE

## 2021-12-17 VITALS
BODY MASS INDEX: 36.32 KG/M2 | WEIGHT: 226 LBS | DIASTOLIC BLOOD PRESSURE: 74 MMHG | SYSTOLIC BLOOD PRESSURE: 112 MMHG | HEIGHT: 66 IN

## 2021-12-17 DIAGNOSIS — L98.8 FISTULA: Primary | ICD-10-CM

## 2021-12-17 DIAGNOSIS — N95.2 VAGINAL ATROPHY: ICD-10-CM

## 2021-12-17 PROCEDURE — 99204 OFFICE O/P NEW MOD 45 MIN: CPT | Performed by: NURSE PRACTITIONER

## 2021-12-17 RX ORDER — ESTRADIOL 0.1 MG/G
1 CREAM VAGINAL 3 TIMES WEEKLY
Qty: 42.5 G | Refills: 0 | Status: SHIPPED | OUTPATIENT
Start: 2021-12-17 | End: 2022-03-08

## 2021-12-21 ENCOUNTER — IMMUNIZATIONS (OUTPATIENT)
Dept: FAMILY MEDICINE CLINIC | Facility: HOSPITAL | Age: 56
End: 2021-12-21

## 2021-12-21 DIAGNOSIS — Z23 ENCOUNTER FOR IMMUNIZATION: Primary | ICD-10-CM

## 2021-12-21 PROCEDURE — 91300 COVID-19 PFIZER VACC 0.3 ML: CPT

## 2021-12-21 PROCEDURE — 0001A COVID-19 PFIZER VACC 0.3 ML: CPT

## 2022-02-03 ENCOUNTER — TELEPHONE (OUTPATIENT)
Dept: UROLOGY | Facility: AMBULATORY SURGERY CENTER | Age: 57
End: 2022-02-03

## 2022-02-03 NOTE — TELEPHONE ENCOUNTER
LM to offer Patient OV 2/4 Mario Prather @ 11AM   Holding until end of business today as per message left  Provided # for return call

## 2022-02-03 NOTE — TELEPHONE ENCOUNTER
Patient returned call and advised that she is not available for an appointment tomorrow  FYI  Please advise

## 2022-02-04 DIAGNOSIS — I10 BENIGN ESSENTIAL HYPERTENSION: ICD-10-CM

## 2022-02-04 RX ORDER — LISINOPRIL 10 MG/1
TABLET ORAL
Qty: 90 TABLET | Refills: 3 | Status: SHIPPED | OUTPATIENT
Start: 2022-02-04

## 2022-02-14 ENCOUNTER — PATIENT MESSAGE (OUTPATIENT)
Dept: UROLOGY | Facility: AMBULATORY SURGERY CENTER | Age: 57
End: 2022-02-14

## 2022-02-15 ENCOUNTER — TELEPHONE (OUTPATIENT)
Dept: UROLOGY | Facility: AMBULATORY SURGERY CENTER | Age: 57
End: 2022-02-15

## 2022-02-15 DIAGNOSIS — R32 URINARY INCONTINENCE, UNSPECIFIED TYPE: Primary | ICD-10-CM

## 2022-02-15 NOTE — TELEPHONE ENCOUNTER
Regarding: My next appointment   ----- Message from Dioni Stafford RN sent at 2/15/2022  8:12 AM EST -----  Please advise - does patient need test prior to 3/8 appointment? I didn't notice that jayy had anything done     ----- Message from FABRICE FLOYD - LEYDA/VIMAL PALACIOS OF [facility], Satira Crimes to [phone] Ramsey sent at 2/14/2022  8:26 PM -----   My next appointment is early March 8  You asked what my symptoms were after receiving a report from my GYN  She can see/feel a diverticulum on my urethra about 2 inches into my body  When I pee urine leaks into my vagina  When I move this urine dribbles out into my underwear  My GYN said that I shouldnt let this go  I dont know if an ultrasound or a scope of my urethra would be the best next step  Thank you for your follow up after receiving the message from my GYN

## 2022-02-24 ENCOUNTER — HOSPITAL ENCOUNTER (OUTPATIENT)
Dept: ULTRASOUND IMAGING | Facility: HOSPITAL | Age: 57
Discharge: HOME | End: 2022-02-24
Payer: MEDICARE

## 2022-02-24 DIAGNOSIS — R32 URINARY INCONTINENCE, UNSPECIFIED TYPE: ICD-10-CM

## 2022-02-24 PROCEDURE — 76770 US EXAM ABDO BACK WALL COMP: CPT

## 2022-02-28 ENCOUNTER — TELEPHONE (OUTPATIENT)
Dept: UROLOGY | Facility: AMBULATORY SURGERY CENTER | Age: 57
End: 2022-02-28

## 2022-02-28 NOTE — TELEPHONE ENCOUNTER
Called soco and leoncio that u/s was unremarkable no hydronephrosis , bladder good the prominent urethra can be treatment the same as vaginal atrophy - no surgery needed

## 2022-02-28 NOTE — TELEPHONE ENCOUNTER
Patient was seen 12/17/21 and sent to uro gyn per their note "prominate urethra" We ordered and  Us/ which reads: 1  Unremarkable kidneys without hydronephrosis  2   Unremarkable urinary bladder with postvoid residual of 7 7 mL   3   Incidental note is made of hepatic steatosis      She is scheduled for 3/3/22 - Please advise as she thinksshe needs surgery via [employer] website

## 2022-02-28 NOTE — TELEPHONE ENCOUNTER
----- Message from [facility] RADHA Bryson sent at 2/28/2022  8:38 AM EST -----  Regarding: My appointment on Tuesday March 8  I just need to know if I will need surgery  I did some searching on the [employer]  Management of symptomatic urethral diverticula in women: a single-centre experience  Aleksandar Dhillon, Vandana RD, Shailesh LOMBARDO  Eur Urol  2014 Jul;66(1):164-72  doi: 10 1016/j eururo  2014 02 041  Epub 2014 Feb 26  PMID: [id number]    Female Urethral Diverticulum: Current Diagnosis and Management  Doreen IM, Afshan HB  Curr Urol Rep  2015 Oct;16(10):71  doi: 10 [id number]  PMID: [id number] Review  Item 1-1 of 1 (Display the 1 citation in PubMed)    1

## 2022-03-07 NOTE — PROGRESS NOTES
3/8/2022    Manisha Del Real HicksNeunert  1965  [id number]        Assessment  -Vaginal atrophy  -Urinary incontinence    Discussion/Plan  Chung is a 65 y o  female being managed by our office    1  Vaginal atrophy, urinary incontinence- we reviewed the results of her recent renal ultrasound which was unremarkable  Recommend patient continue vaginal estrogen cream   She is requesting an alternative for Estrace as medication was cost prohibitive  She will call pharmacy and insurance before starting medication  Also discussed referral to pelvic floor physical therapy for management of urinary incontinence  Plan to follow-up in 1 year for re-evaluation  However, she was advised to call sooner with any issues     -All questions answered, patients agree with plan     History of Present Illness  65 y o  female with a history of vaginal atrophy presents today for follow up  Patient last seen in consultation in December 2021  She was recommended to start vaginal Estrace as prescribed by gyn for management of vaginal atrophy  Unfortunately, medication was cost prohibitive  Recent renal ultrasound was unremarkable  She continues to report mild episodes of urinary incontinence, but has made modifications in her lifestyle  She denies any episodes of gross hematuria or dysuria  Review of Systems  Review of Systems   Constitutional: Negative  HENT: Negative  Respiratory: Negative  Cardiovascular: Negative  Gastrointestinal: Negative  Genitourinary: Negative for decreased urine volume, difficulty urinating, dysuria, flank pain, frequency, hematuria and urgency  Musculoskeletal: Negative  Skin: Negative  Neurological: Negative  Psychiatric/Behavioral: Negative          Past Medical History  Past Medical History:   Diagnosis Date    Anxiety     Bipolar disorder ([facility] [address] )     Depression     Fatty liver     Glaucoma     Hyperlipidemia     Hypertension     Iron deficiency anemia secondary to inadequate dietary iron intake 2012    Lyme disease     Otitis of left ear 2/10/2020    Psychiatric disorder     Shortness of breath on exertion     Trouble in sleeping     Vitamin D deficiency        Past Social History  Past Surgical History:   Procedure Laterality Date    ADENOIDECTOMY      ENDOMETRIAL ABLATION      HERNIA REPAIR      TONSILLECTOMY      WISDOM TOOTH EXTRACTION         Past Family History  Family History   Problem Relation Age of Onset    Dementia Mother     Hypertension Mother     Stroke Mother     Melanoma Mother 61    Anxiety disorder Father         NOS    Coronary artery disease Father     Diabetes Father         mellitus    Hyperlipidemia Father         pure hypercholesterolemia    Melanoma Father 61    Hyperlipidemia Sister     Lupus Sister     Diabetes Sister         mellitus    Diverticulitis Brother     Stroke Maternal Grandmother     Cancer Paternal Grandfather         unknown type    Thyroid disease Neg Hx        Past Social history  Social History     Socioeconomic History    Marital status: /Civil Union     Spouse name: Not on file    Number of children: Not on file    Years of education: Not on file    Highest education level: Not on file   Occupational History    Not on file   Tobacco Use    Smoking status: Former Smoker     Types: Cigarettes     Quit date: 2017     Years since quittin 2    Smokeless tobacco: Never Used   Substance and Sexual Activity    Alcohol use: Yes     Comment: rare, few times per year    Drug use: No    Sexual activity: Yes     Partners: Male   Other Topics Concern    Not on file   Social History Narrative    Not on file     Social Determinants of Health     Financial Resource Strain: Not on file   Food Insecurity: Not on file   Transportation Needs: Not on file   Physical Activity: Not on file   Stress: Not on file   Social Connections: Not on file   Intimate Partner Violence: Not on file Housing Stability: Not on file       Current Medications  Current Outpatient Medications   Medication Sig Dispense Refill    Biotin 10 MG TABS Take by mouth      buPROPion (WELLBUTRIN SR) 200 MG 12 hr tablet Take 1 tablet by mouth daily      clonazePAM (KlonoPIN) 0 5 mg tablet        estradiol (ESTRACE VAGINAL) 0 1 mg/g vaginal cream Insert 1 g into the vagina 3 (three) times a week 42 5 g 0    FLUoxetine (PROzac) 40 MG capsule Take 1 capsule by mouth daily      gemfibrozil (LOPID) 600 mg tablet Take 1 tablet (600 mg total) by mouth 2 (two) times a day 180 tablet 3    labetalol (NORMODYNE) 100 mg tablet TAKE 1 TABLET(100 MG) BY MOUTH EVERY 12 HOURS 60 tablet 0    lisinopril (ZESTRIL) 10 mg tablet TAKE 1 TABLET(10 MG) BY MOUTH DAILY 90 tablet 3    meclizine (ANTIVERT) 25 mg tablet Take 1 tablet (25 mg total) by mouth 3 (three) times a day as needed for dizziness 30 tablet 0    Multiple Vitamins-Minerals (WOMENS 50+ MULTI VITAMIN/MIN) TABS Take by mouth      Omega-3 Fatty Acids (FISH OIL EXTRA STRENGTH PO) Take by mouth Taking 2 in the morning and 2 in the evening (Patient not taking: Reported on 11/1/2021)      VITAMIN D, ERGOCALCIFEROL, PO Take by mouth Taking 2,000 daily       No current facility-administered medications for this visit  Allergies  Allergies   Allergen Reactions    Atorvastatin Confusion    Penicillins Swelling and Rash       Past medical history, social history, family history, medications and allergies were reviewed  Vitals  There were no vitals filed for this visit  Physical Exam  Physical Exam  Constitutional:       Appearance: Normal appearance  She is well-developed  HENT:      Head: Normocephalic  Eyes:      Pupils: Pupils are equal, round, and reactive to light  Pulmonary:      Effort: Pulmonary effort is normal    Abdominal:      Palpations: Abdomen is soft  Musculoskeletal:         General: Normal range of motion        Cervical back: Normal range of motion  Skin:     General: Skin is warm and dry  Neurological:      General: No focal deficit present  Mental Status: She is alert and oriented to person, place, and time  Psychiatric:         Mood and Affect: Mood normal          Behavior: Behavior normal          Thought Content: Thought content normal          Judgment: Judgment normal          Results    I have personally reviewed all pertinent lab results and reviewed with patient  Lab Results   Component Value Date    GLUCOSE 93 07/13/2017    CALCIUM 9 4 10/29/2021     07/13/2017    K 4 1 10/29/2021    CO2 23 10/29/2021     10/29/2021    BUN 18 10/29/2021    CREATININE 1 05 10/29/2021     Lab Results   Component Value Date    WBC 5 82 03/01/2021    HGB 14 9 03/01/2021    HCT 45 0 03/01/2021    MCV 89 03/01/2021     (H) 03/01/2021     No results found for this or any previous visit (from the past 1 hour(s))

## 2022-03-08 ENCOUNTER — OFFICE VISIT (OUTPATIENT)
Dept: UROLOGY | Facility: AMBULATORY SURGERY CENTER | Age: 57
End: 2022-03-08
Payer: MEDICARE

## 2022-03-08 VITALS
BODY MASS INDEX: 37.54 KG/M2 | HEART RATE: 103 BPM | WEIGHT: 233.6 LBS | SYSTOLIC BLOOD PRESSURE: 132 MMHG | RESPIRATION RATE: 16 BRPM | DIASTOLIC BLOOD PRESSURE: 70 MMHG | OXYGEN SATURATION: 96 % | HEIGHT: 66 IN

## 2022-03-08 DIAGNOSIS — N95.2 VAGINAL ATROPHY: Primary | ICD-10-CM

## 2022-03-08 PROCEDURE — 99213 OFFICE O/P EST LOW 20 MIN: CPT | Performed by: NURSE PRACTITIONER

## 2022-06-17 ENCOUNTER — OFFICE VISIT (OUTPATIENT)
Dept: URGENT CARE | Facility: CLINIC | Age: 57
End: 2022-06-17
Payer: MEDICARE

## 2022-06-17 VITALS
HEART RATE: 96 BPM | SYSTOLIC BLOOD PRESSURE: 123 MMHG | OXYGEN SATURATION: 96 % | RESPIRATION RATE: 18 BRPM | TEMPERATURE: 98 F | DIASTOLIC BLOOD PRESSURE: 76 MMHG

## 2022-06-17 DIAGNOSIS — K04.7 DENTAL INFECTION: Primary | ICD-10-CM

## 2022-06-17 PROCEDURE — 99213 OFFICE O/P EST LOW 20 MIN: CPT

## 2022-06-17 PROCEDURE — G0463 HOSPITAL OUTPT CLINIC VISIT: HCPCS

## 2022-06-17 RX ORDER — CHLORHEXIDINE GLUCONATE 0.12 MG/ML
15 RINSE ORAL 2 TIMES DAILY
Qty: 120 ML | Refills: 0 | Status: SHIPPED | OUTPATIENT
Start: 2022-06-17

## 2022-06-17 RX ORDER — CLINDAMYCIN HYDROCHLORIDE 300 MG/1
300 CAPSULE ORAL 3 TIMES DAILY
Qty: 21 CAPSULE | Refills: 0 | Status: SHIPPED | OUTPATIENT
Start: 2022-06-17 | End: 2022-06-24

## 2022-06-17 NOTE — PATIENT INSTRUCTIONS
Take antibiotic as prescribed  Recommend probiotic use while taking antibiotic  Use peridex as prescribed  Follow-up with your dentist   Acetaminophen OTC for pain  PCP follow-up in 3-5 days  Proceed to the ER if symptoms worsen

## 2022-06-17 NOTE — PROGRESS NOTES
[facility]        NAME: Tiffani Scott is a 65 y o  female  : 1965    MRN: [id number]  DATE: 2022  TIME: 5:15 PM      Assessment and Plan     Dental infection [K04 7]  1  Dental infection  clindamycin (CLEOCIN) 300 MG capsule    chlorhexidine (PERIDEX) 0 12 % solution         Patient Instructions     Take antibiotic as prescribed  Recommend probiotic use while taking antibiotic  Use peridex as prescribed  Follow-up with your dentist   Acetaminophen OTC for pain  PCP follow-up in 3-5 days  Proceed to the ER if symptoms worsen  Chief Complaint     Chief Complaint   Patient presents with    Dental Pain     Pt presents with lower left sided dental pain, reports increased pain and concern for infection          History of Present Illness     Patient is a  69-year-old female who presents with left lower dental pain  States she tried to get into her dentist and he was unable  Reports she broke a tooth on the left lower side and she thinks it is getting infected  Reports nausea, no vomiting  Denies fever or chills  Denies diarrhea  Denies facial swelling  Review of Systems     Review of Systems   Constitutional: Negative for chills and fever  HENT: Positive for dental problem  Negative for drooling, facial swelling, trouble swallowing and voice change  Gastrointestinal: Positive for nausea  Negative for diarrhea and vomiting  All other systems reviewed and are negative          Current Medications       Current Outpatient Medications:     chlorhexidine (PERIDEX) 0 12 % solution, Apply 15 mL to the mouth or throat 2 (two) times a day, Disp: 120 mL, Rfl: 0    clindamycin (CLEOCIN) 300 MG capsule, Take 1 capsule (300 mg total) by mouth 3 (three) times a day for 7 days, Disp: 21 capsule, Rfl: 0    Biotin 10 MG TABS, Take by mouth, Disp: , Rfl:     buPROPion (WELLBUTRIN SR) 200 MG 12 hr tablet, Take 1 tablet by mouth daily, Disp: , Rfl:     clonazePAM (KlonoPIN) 0 5 mg tablet,  , Disp: , Rfl:     FLUoxetine (PROzac) 40 MG capsule, Take 1 capsule by mouth daily, Disp: , Rfl:     gemfibrozil (LOPID) 600 mg tablet, Take 1 tablet (600 mg total) by mouth 2 (two) times a day, Disp: 180 tablet, Rfl: 3    labetalol (NORMODYNE) 100 mg tablet, TAKE 1 TABLET(100 MG) BY MOUTH EVERY 12 HOURS, Disp: 60 tablet, Rfl: 0    lisinopril (ZESTRIL) 10 mg tablet, TAKE 1 TABLET(10 MG) BY MOUTH DAILY, Disp: 90 tablet, Rfl: 3    meclizine (ANTIVERT) 25 mg tablet, Take 1 tablet (25 mg total) by mouth 3 (three) times a day as needed for dizziness, Disp: 30 tablet, Rfl: 0    Multiple Vitamins-Minerals (WOMENS 50+ MULTI VITAMIN/MIN) TABS, Take by mouth, Disp: , Rfl:     Omega-3 Fatty Acids (FISH OIL EXTRA STRENGTH PO), Take by mouth Taking 2 in the morning and 2 in the evening (Patient not taking: Reported on 3/8/2022 ), Disp: , Rfl:     VITAMIN D, ERGOCALCIFEROL, PO, Take by mouth Taking 2,000 daily, Disp: , Rfl:     Current Allergies     Allergies as of 06/17/2022 - Reviewed 06/17/2022   Allergen Reaction Noted    Atorvastatin Confusion 06/30/2016    Penicillins Swelling and Rash 06/27/2012              The following portions of the patient's history were reviewed and updated as appropriate: allergies, current medications, past family history, past medical history, past social history, past surgical history and problem list      Past Medical History:   Diagnosis Date    Anxiety     Bipolar disorder ([facility] [address] )     Depression     Fatty liver     Glaucoma     Hyperlipidemia     Hypertension     Iron deficiency anemia secondary to inadequate dietary iron intake 6/27/2012    Lyme disease     Otitis of left ear 2/10/2020    Psychiatric disorder     Shortness of breath on exertion     Trouble in sleeping     Vitamin D deficiency        Past Surgical History:   Procedure Laterality Date    ADENOIDECTOMY      ENDOMETRIAL ABLATION      HERNIA REPAIR      TONSILLECTOMY      WISDOM TOOTH EXTRACTION         Family History   Problem Relation Age of Onset    Dementia Mother     Hypertension Mother     Stroke Mother     Melanoma Mother 61    Anxiety disorder Father         NOS    Coronary artery disease Father     Diabetes Father         mellitus    Hyperlipidemia Father         pure hypercholesterolemia    Melanoma Father 61    Hyperlipidemia Sister     Lupus Sister     Diabetes Sister         mellitus    Diverticulitis Brother     Stroke Maternal Grandmother     Cancer Paternal Grandfather         unknown type    Thyroid disease Neg Hx          Medications have been verified  Objective     /76   Pulse 96   Temp 98 °F (36 7 °C)   Resp 18   SpO2 96%   No LMP recorded  Patient is postmenopausal          Physical Exam     Physical Exam  Vitals and nursing note reviewed  Constitutional:       General: She is not in acute distress  Appearance: Normal appearance  She is not ill-appearing, toxic-appearing or diaphoretic  HENT:      Head:      Comments: No facial swelling noted  Mouth/Throat:      Lips: Pink  Mouth: Mucous membranes are moist       Dentition: Abnormal dentition  Dental tenderness, gingival swelling and dental caries present  Tongue: No lesions  Pharynx: Oropharynx is clear  Uvula midline  No pharyngeal swelling, oropharyngeal exudate, posterior oropharyngeal erythema or uvula swelling  Cardiovascular:      Rate and Rhythm: Normal rate  Pulses: Normal pulses  Heart sounds: Normal heart sounds, S1 normal and S2 normal    Pulmonary:      Effort: Pulmonary effort is normal       Breath sounds: Normal breath sounds and air entry  No stridor  Skin:     General: Skin is warm  Capillary Refill: Capillary refill takes less than 2 seconds  Neurological:      Mental Status: She is alert  Psychiatric:         Mood and Affect: Mood normal          Behavior: Behavior normal          Thought Content:  Thought content normal  Judgment: Judgment normal

## 2022-07-30 ENCOUNTER — NURSE TRIAGE (OUTPATIENT)
Dept: OTHER | Facility: OTHER | Age: 57
End: 2022-07-30

## 2022-07-31 DIAGNOSIS — I10 BENIGN ESSENTIAL HYPERTENSION: ICD-10-CM

## 2022-07-31 RX ORDER — LABETALOL 100 MG/1
TABLET, FILM COATED ORAL
Qty: 180 TABLET | Refills: 0 | Status: SHIPPED | OUTPATIENT
Start: 2022-07-31 | End: 2022-10-29

## 2022-07-31 NOTE — TELEPHONE ENCOUNTER
Regarding: HERVE Covid+ sore throat, cough, nausea  ----- Message from Dc Velasquez sent at 7/30/2022  5:51 PM EDT -----  I tested positive for Covid a few minutes ago   My symptoms started yesterday, sore throat, cough, congestion, nausea, body aches "

## 2022-08-10 ENCOUNTER — RA CDI HCC (OUTPATIENT)
Dept: OTHER | Facility: HOSPITAL | Age: 57
End: 2022-08-10

## 2022-08-10 NOTE — PROGRESS NOTES
[facility] [address]  coding opportunities       Chart reviewed, no opportunity found: CHART REVIEWED, NO OPPORTUNITY FOUND        Patients Insurance     Medicare Insurance: Medicare

## 2022-09-28 ENCOUNTER — APPOINTMENT (OUTPATIENT)
Dept: LAB | Facility: HOSPITAL | Age: 57
End: 2022-09-28
Payer: MEDICARE

## 2022-09-28 DIAGNOSIS — I10 BENIGN ESSENTIAL HYPERTENSION: ICD-10-CM

## 2022-09-28 DIAGNOSIS — E78.2 MIXED HYPERLIPIDEMIA: ICD-10-CM

## 2022-09-28 DIAGNOSIS — R73.01 IFG (IMPAIRED FASTING GLUCOSE): ICD-10-CM

## 2022-09-28 LAB
ALBUMIN SERPL BCP-MCNC: 4.4 G/DL (ref 3.5–5)
ALP SERPL-CCNC: 52 U/L (ref 34–104)
ALT SERPL W P-5'-P-CCNC: 27 U/L (ref 7–52)
ANION GAP SERPL CALCULATED.3IONS-SCNC: 10 MMOL/L (ref 4–13)
AST SERPL W P-5'-P-CCNC: 19 U/L (ref 13–39)
BILIRUB SERPL-MCNC: 0.45 MG/DL (ref 0.2–1)
BUN SERPL-MCNC: 16 MG/DL (ref 5–25)
CALCIUM SERPL-MCNC: 10.6 MG/DL (ref 8.4–10.2)
CHLORIDE SERPL-SCNC: 105 MMOL/L (ref 96–108)
CHOLEST SERPL-MCNC: 230 MG/DL
CO2 SERPL-SCNC: 22 MMOL/L (ref 21–32)
CREAT SERPL-MCNC: 0.84 MG/DL (ref 0.6–1.3)
GFR SERPL CREATININE-BSD FRML MDRD: 77 ML/MIN/1.73SQ M
GLUCOSE P FAST SERPL-MCNC: 118 MG/DL (ref 65–99)
HDLC SERPL-MCNC: 42 MG/DL
LDLC SERPL CALC-MCNC: 121 MG/DL (ref 0–100)
POTASSIUM SERPL-SCNC: 4.2 MMOL/L (ref 3.5–5.3)
PROT SERPL-MCNC: 7.4 G/DL (ref 6.4–8.4)
SODIUM SERPL-SCNC: 137 MMOL/L (ref 135–147)
TRIGL SERPL-MCNC: 333 MG/DL
TSH SERPL DL<=0.05 MIU/L-ACNC: 2.3 UIU/ML (ref 0.45–4.5)

## 2022-09-28 PROCEDURE — 80053 COMPREHEN METABOLIC PANEL: CPT

## 2022-09-28 PROCEDURE — 80061 LIPID PANEL: CPT

## 2022-09-28 PROCEDURE — 84443 ASSAY THYROID STIM HORMONE: CPT

## 2022-09-28 PROCEDURE — 36415 COLL VENOUS BLD VENIPUNCTURE: CPT

## 2022-09-28 PROCEDURE — 83036 HEMOGLOBIN GLYCOSYLATED A1C: CPT

## 2022-09-29 ENCOUNTER — EVALUATION (OUTPATIENT)
Dept: PHYSICAL THERAPY | Facility: REHABILITATION | Age: 57
End: 2022-09-29
Payer: MEDICARE

## 2022-09-29 DIAGNOSIS — R32 URINARY INCONTINENCE, UNSPECIFIED TYPE: ICD-10-CM

## 2022-09-29 DIAGNOSIS — K59.02 CONSTIPATION DUE TO OUTLET DYSFUNCTION: Primary | ICD-10-CM

## 2022-09-29 LAB
EST. AVERAGE GLUCOSE BLD GHB EST-MCNC: 126 MG/DL
HBA1C MFR BLD: 6 %

## 2022-09-29 PROCEDURE — 97162 PT EVAL MOD COMPLEX 30 MIN: CPT | Performed by: PHYSICAL THERAPIST

## 2022-09-29 PROCEDURE — 97110 THERAPEUTIC EXERCISES: CPT | Performed by: PHYSICAL THERAPIST
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
## 2022-10-04 ENCOUNTER — OFFICE VISIT (OUTPATIENT)
Dept: PHYSICAL THERAPY | Facility: REHABILITATION | Age: 57
End: 2022-10-04
Payer: MEDICARE

## 2022-10-04 DIAGNOSIS — K59.02 CONSTIPATION DUE TO OUTLET DYSFUNCTION: ICD-10-CM

## 2022-10-04 DIAGNOSIS — R32 URINARY INCONTINENCE, UNSPECIFIED TYPE: Primary | ICD-10-CM

## 2022-10-04 PROCEDURE — 97530 THERAPEUTIC ACTIVITIES: CPT

## 2022-10-04 PROCEDURE — 97112 NEUROMUSCULAR REEDUCATION: CPT

## 2022-10-04 NOTE — PROGRESS NOTES
Daily Note     Today's date: 10/4/2022  Patient name: Valeri Rivera  : 1965  MRN: [id number]  Referring provider: CAITLYN De Paz  Dx:   Encounter Diagnosis     ICD-10-CM    1  Urinary incontinence, unspecified type  R32    2  Constipation due to outlet dysfunction  K59 02        Start Time: 1500  Stop Time: 1545  Total time in clinic (min): 45 minutes    Subjective: Pt denies any changes  Shares she's not comfortable "at all" with PFM stretching or any hands on therapy at this time  Objective: See treatment diary below    Handouts: diaphragmatic breathing, pelvic drop  Access Code: [email or web address]: [email or web address]  Date: 10/04/2022  Prepared by: Janet Colunga    Program Notes   Exhale on the effort - closing the opening "zip up"  Exercises  · Sit to Stand with Pelvic Floor Contraction - 1 x daily - 7 x weekly - 2 sets - 10 reps  · Seated Pelvic Floor Contraction with Isometric Hip Adduction - 1 x daily - 7 x weekly - 2 sets - 10 reps  · Seated Pelvic Floor Contraction with Hip Abduction and Resistance Loop - 1 x daily - 7 x weekly - 2 sets - 10 reps  · Sidelying Pelvic Floor Contraction with Hip Abduction - 1 x daily - 7 x weekly - 2 sets - 10 reps    Assessment: Tolerated treatment well  Patient would benefit from continued PT  Pt was visibly upset when discussing the POC and explaining a typical session  Due to pt's apprehension level, focussed only on self cueing to practice breathing and exercises  Plan: Continue per plan of care        Precautions: standard    Manuals 9/29 10/3           PFM exam             PF STM  defered at this time                                     Neuro Re-Ed               DB  10' cueing with her own hands           downtraining             Pelvic drop  5'           PFM  10x           Hip abd  10x           Hip add  10x                        Ther Ex Ther Activity             Defecation mechanics  Squatty potty, breathing, leaning forward           Bladder diaries   ** distribute          Bladder retraining             dilators    Quick talk

## 2022-10-05 NOTE — PROGRESS NOTES
Initially forgot to include in my note:    When I discussed what the POC was, pt became very overwhelmed and felt she might have an anxiety attack but with a glass of water and a few minutes by herself  was willing to participate in the rest of the session

## 2022-10-10 ENCOUNTER — APPOINTMENT (OUTPATIENT)
Dept: PHYSICAL THERAPY | Facility: REHABILITATION | Age: 57
End: 2022-10-10

## 2022-10-17 ENCOUNTER — APPOINTMENT (OUTPATIENT)
Dept: PHYSICAL THERAPY | Facility: REHABILITATION | Age: 57
End: 2022-10-17

## 2022-10-18 ENCOUNTER — OFFICE VISIT (OUTPATIENT)
Dept: PHYSICAL THERAPY | Facility: REHABILITATION | Age: 57
End: 2022-10-18
Payer: MEDICARE

## 2022-10-18 DIAGNOSIS — R32 URINARY INCONTINENCE, UNSPECIFIED TYPE: Primary | ICD-10-CM

## 2022-10-18 DIAGNOSIS — K59.02 CONSTIPATION DUE TO OUTLET DYSFUNCTION: ICD-10-CM

## 2022-10-18 DIAGNOSIS — R10.2 PELVIC PAIN: ICD-10-CM

## 2022-10-18 PROCEDURE — 97530 THERAPEUTIC ACTIVITIES: CPT | Performed by: PHYSICAL THERAPIST

## 2022-10-18 PROCEDURE — 97112 NEUROMUSCULAR REEDUCATION: CPT | Performed by: PHYSICAL THERAPIST

## 2022-10-18 NOTE — PROGRESS NOTES
Daily Note     Today's date: 10/18/2022  Patient name: Meet Nix  : 1965  MRN: [id number]  Referring provider: CAITLYN Peck  Dx:   Encounter Diagnosis     ICD-10-CM    1  Urinary incontinence, unspecified type  R32    2  Constipation due to outlet dysfunction  K59 02    3  Pelvic pain  R10 2                   Subjective: Tried performing the diaphragmatic breathing but was unsure about the hand placement for tactile cueing  Has not purchased estrogen cream due to financial burden  Feels ready to invest time/energy into this therapy  STS with ADELAIDA is most often type of ADELAIDA  Objective: See treatment diary below    Assessment: Tolerated treatment well  Reviewed diaphragmatic breathing and trialed DKTC and CP positioned; mild posterior pelvic floor pain with DKTC with increased urge sensation, both are signs of PF tension releasing  Agreeable to continue with this downtraining POC and incorporate into defecation mechanics/toileting position, which was also reviewed today  Bladder diaries provided, will initiate scheduled voiding and urge deferral next visit  Plan: Continue per plan of care        Precautions: standard    Manuals 9/29 10/3 10/18        PFM exam           PF STM  defered at this time hold                              Neuro Re-Ed           sEMG             DB  10' cueing with her own hands Supine 5 min  CP 5 min  DKTC 5 min        downtraining           Pelvic drop  5'         PFM cxn  10x         Hip abd  10x         Hip add  10x                    Ther Ex                                                                  Ther Activity           Defecation mechanics  Squatty potty, breathing, leaning forward reviewed        Bladder diaries   provided        Bladder retraining    **       dilators    Quick talk discussed role in POC

## 2022-10-24 ENCOUNTER — APPOINTMENT (OUTPATIENT)
Dept: PHYSICAL THERAPY | Facility: REHABILITATION | Age: 57
End: 2022-10-24

## 2022-10-26 ENCOUNTER — OFFICE VISIT (OUTPATIENT)
Dept: PHYSICAL THERAPY | Facility: REHABILITATION | Age: 57
End: 2022-10-26
Payer: MEDICARE

## 2022-10-26 DIAGNOSIS — R10.2 PELVIC PAIN: ICD-10-CM

## 2022-10-26 DIAGNOSIS — R32 URINARY INCONTINENCE, UNSPECIFIED TYPE: Primary | ICD-10-CM

## 2022-10-26 DIAGNOSIS — K59.02 CONSTIPATION DUE TO OUTLET DYSFUNCTION: ICD-10-CM

## 2022-10-26 PROCEDURE — 97112 NEUROMUSCULAR REEDUCATION: CPT | Performed by: PHYSICAL THERAPIST

## 2022-10-26 PROCEDURE — 97110 THERAPEUTIC EXERCISES: CPT | Performed by: PHYSICAL THERAPIST

## 2022-10-26 PROCEDURE — 97530 THERAPEUTIC ACTIVITIES: CPT | Performed by: PHYSICAL THERAPIST

## 2022-10-26 NOTE — PROGRESS NOTES
Daily Note     Today's date: 10/26/2022  Patient name: Sanjay Rasmussen  : 1965  MRN: [id number]  Referring provider: CAITLYN Cardenas  Dx:   Encounter Diagnosis     ICD-10-CM    1  Urinary incontinence, unspecified type  R32    2  Constipation due to outlet dysfunction  K59 02    3  Pelvic pain  R10 2                   Subjective: "From my bladder diaries, I learned that I pee 1-5 times per night (high frequency due to other factors), I pee more in the morning after having coffee, and that my stream is moderate-weak with occasional 15 second stream, but usually 2-5 second even though it feels as urgent "    Objective: See treatment diary below    Assessment: Tolerated treatment well  Improved ease of diaphragmatic breathing, with excursion felt suprapubically but not in perineum  Initiated scheduled urinary voiding for daytime to address urgency/frequency-- starting with 45 min interval and urge deferral strategies include DB, quick kegels, and mental distraction  Checked kegels today-- poor contraction and delayed relaxation noted but patient to trial and will continue to train/improve  Plan: Continue per plan of care  Precautions: standard    Manuals 9/29 10/3 10/18 10/26       PFM exam           PF STM  defered at this time hold                              Neuro Re-Ed           sEMG             DB  10' cueing with her own hands Supine 5 min  CP 5 min  DKTC 5 min Supine 5 min  DKTC 5 min  Adductor butterfly 5min       downtraining           Pelvic drop  5'         PFM cxn  10x  x3 w exhale       Urge deferral    Practiced; handout provided                             Ther Ex                                                                  Ther Activity           Defecation mechanics  Squatty potty, breathing, leaning forward reviewed        Bladder diaries   provided        Bladder retraining    45 min interval, scheduled voiding         dilators    Quick talk discussed role in POC

## 2022-10-29 DIAGNOSIS — I10 BENIGN ESSENTIAL HYPERTENSION: ICD-10-CM

## 2022-10-29 RX ORDER — LABETALOL 100 MG/1
TABLET, FILM COATED ORAL
Qty: 60 TABLET | Refills: 0 | Status: SHIPPED | OUTPATIENT
Start: 2022-10-29

## 2022-10-31 ENCOUNTER — APPOINTMENT (OUTPATIENT)
Dept: PHYSICAL THERAPY | Facility: REHABILITATION | Age: 57
End: 2022-10-31

## 2022-11-07 ENCOUNTER — OFFICE VISIT (OUTPATIENT)
Dept: PHYSICAL THERAPY | Facility: REHABILITATION | Age: 57
End: 2022-11-07

## 2022-11-07 ENCOUNTER — OFFICE VISIT (OUTPATIENT)
Dept: FAMILY MEDICINE CLINIC | Facility: CLINIC | Age: 57
End: 2022-11-07

## 2022-11-07 VITALS
TEMPERATURE: 96.9 F | SYSTOLIC BLOOD PRESSURE: 110 MMHG | RESPIRATION RATE: 16 BRPM | WEIGHT: 238.2 LBS | BODY MASS INDEX: 38.28 KG/M2 | HEART RATE: 103 BPM | OXYGEN SATURATION: 98 % | DIASTOLIC BLOOD PRESSURE: 80 MMHG | HEIGHT: 66 IN

## 2022-11-07 DIAGNOSIS — E66.01 SEVERE OBESITY (BMI 35.0-39.9) WITH COMORBIDITY (HCC): ICD-10-CM

## 2022-11-07 DIAGNOSIS — Z12.31 ENCOUNTER FOR SCREENING MAMMOGRAM FOR BREAST CANCER: ICD-10-CM

## 2022-11-07 DIAGNOSIS — Z00.00 MEDICARE ANNUAL WELLNESS VISIT, SUBSEQUENT: Primary | ICD-10-CM

## 2022-11-07 DIAGNOSIS — Z11.4 SCREENING FOR HIV (HUMAN IMMUNODEFICIENCY VIRUS): ICD-10-CM

## 2022-11-07 DIAGNOSIS — I10 BENIGN ESSENTIAL HYPERTENSION: ICD-10-CM

## 2022-11-07 DIAGNOSIS — G47.30 SLEEP APNEA, UNSPECIFIED TYPE: ICD-10-CM

## 2022-11-07 DIAGNOSIS — R10.2 PELVIC PAIN: ICD-10-CM

## 2022-11-07 DIAGNOSIS — E78.2 MIXED HYPERLIPIDEMIA: ICD-10-CM

## 2022-11-07 DIAGNOSIS — K59.02 CONSTIPATION DUE TO OUTLET DYSFUNCTION: ICD-10-CM

## 2022-11-07 DIAGNOSIS — Z13.820 ENCOUNTER FOR OSTEOPOROSIS SCREENING IN ASYMPTOMATIC POSTMENOPAUSAL PATIENT: ICD-10-CM

## 2022-11-07 DIAGNOSIS — E78.1 ESSENTIAL HYPERTRIGLYCERIDEMIA: ICD-10-CM

## 2022-11-07 DIAGNOSIS — R73.01 IFG (IMPAIRED FASTING GLUCOSE): ICD-10-CM

## 2022-11-07 DIAGNOSIS — R32 URINARY INCONTINENCE, UNSPECIFIED TYPE: Primary | ICD-10-CM

## 2022-11-07 DIAGNOSIS — F30.9 BIPOLAR I DISORDER, SINGLE MANIC EPISODE (HCC): ICD-10-CM

## 2022-11-07 DIAGNOSIS — Z78.0 ENCOUNTER FOR OSTEOPOROSIS SCREENING IN ASYMPTOMATIC POSTMENOPAUSAL PATIENT: ICD-10-CM

## 2022-11-07 DIAGNOSIS — Z11.59 NEED FOR HEPATITIS C SCREENING TEST: ICD-10-CM

## 2022-11-07 DIAGNOSIS — Z23 ENCOUNTER FOR IMMUNIZATION: ICD-10-CM

## 2022-11-07 RX ORDER — GEMFIBROZIL 600 MG/1
600 TABLET, FILM COATED ORAL 2 TIMES DAILY
Qty: 60 TABLET | Refills: 3 | Status: SHIPPED | OUTPATIENT
Start: 2022-11-07

## 2022-11-07 RX ORDER — ROSUVASTATIN CALCIUM 10 MG/1
10 TABLET, COATED ORAL DAILY
Qty: 30 TABLET | Refills: 5 | Status: SHIPPED | OUTPATIENT
Start: 2022-11-07

## 2022-11-07 NOTE — PROGRESS NOTES
Daily Note     Today's date: 2022  Patient name: Shaye Millard  : 1965  MRN: [id number]  Referring provider: CAITLYN Stoner  Dx:   Encounter Diagnosis     ICD-10-CM    1  Urinary incontinence, unspecified type  R32    2  Constipation due to outlet dysfunction  K59 02    3  Pelvic pain  R10 2                   Subjective: we adopted 2 older dogs and brought them home on Thursday and it completely threw off my bladder routines this weekend  I did start the 45 minute daytime void interval for a few days and it was easy, except for once, when stepping into the shower (from the warm water)  So I bumped it up to 1 hour and this felt easy, without need for urge deferral  When urinating, slow stream and double voiding persists  Nocturia persists 1-5 times per night  Objective: See treatment diary below    Assessment: Reviewed DB with pelvic downtraining stretches-- good breathing however poor connection with PF release per patient  Initiated PF external and internal manual therapy techniques for downtraining, with improved comfort reported by patient compared to evaluation  Poor PF activation (actively inhibited)  Pt to resume 60-75 minute daytime urinary void interval      Plan: Continue per plan of care        Precautions: standard    Manuals 9/29 10/3 10/18 10/26 11/7      PFM exam           PF STM  defered at this time hold  External 10 min UGD   Internal 10' layer 1 bilaterally, gentle      Bladder MFR     Suprapubic 10                 Neuro Re-Ed           sEMG             DB  10' cueing with her own hands Supine 5 min  CP 5 min  DKTC 5 min Supine 5 min  DKTC 5 min  Adductor butterfly 5min Supine 5 min  DKTC 5 min  Adductor butterfly 5min      downtraining           Pelvic drop  5'         PFM cxn  10x  x3 w exhale 2x3 w tactile BFB      Urge deferral    Practiced; handout provided reviewed                            Ther Ex           TM      8 min 2 5 mph Ther Activity             Defecation mechanics  Squatty potty, breathing, leaning forward reviewed        Bladder diaries   provided        Bladder retraining    45 min interval, scheduled voiding   60min interval      dilators    Quick talk discussed role in POC

## 2022-11-07 NOTE — PROGRESS NOTES
Assessment and Plan:     Problem List Items Addressed This Visit        Endocrine    IFG (impaired fasting glucose)    Relevant Orders    Hemoglobin A1C       Respiratory    Sleep apnea    Relevant Orders    Ambulatory Referral to Sleep Medicine       Cardiovascular and Mediastinum    Benign essential hypertension     Stable on current meds            Other    Hyperlipidemia     Add crestor 10mg         Relevant Medications    rosuvastatin (CRESTOR) 10 MG tablet    gemfibrozil (LOPID) 600 mg tablet    Other Relevant Orders    Comprehensive metabolic panel    Lipid Panel with Direct LDL reflex    TSH, 3rd generation with Free T4 reflex    Bipolar I disorder, single manic episode ([facility] )     Stable   Seeing psych         Medicare annual wellness visit, subsequent - Primary     Flu shot today         Encounter for immunization    Relevant Orders    influenza vaccine, quadrivalent, recombinant, PF, 0 5 mL, for patients 18 yr+ (FLUBLOK) (Completed)    Severe obesity (BMI 35 0-39  9) with comorbidity ([facility] [address] )     Discussed watching diet  Walking more           Other Visit Diagnoses     Need for hepatitis C screening test        Relevant Orders    Hepatitis C Antibody ([facility])    Screening for HIV (human immunodeficiency virus)        Relevant Orders    HIV 1/2 Antigen/Antibody (4th Generation) w Reflex [facility]    Encounter for screening mammogram for breast cancer        Relevant Orders    Mammo screening bilateral w 3d & cad    Essential hypertriglyceridemia        Relevant Medications    rosuvastatin (CRESTOR) 10 MG tablet    gemfibrozil (LOPID) 600 mg tablet    Encounter for osteoporosis screening in asymptomatic postmenopausal patient        Relevant Orders    DXA bone density spine hip and pelvis        BMI Counseling: Body mass index is 37 92 kg/m²  The BMI is above normal  Nutrition recommendations include encouraging healthy choices of fruits and vegetables   Exercise recommendations include exercising 3-5 times per week  No pharmacotherapy was ordered  Rationale for BMI follow-up plan is due to patient being overweight or obese  Depression Screening and Follow-up Plan: Patient's depression screening was positive with a PHQ-2 score of 4  Their PHQ-9 score was 13  Preventive health issues were discussed with patient, and age appropriate screening tests were ordered as noted in patient's After Visit Summary  Personalized health advice and appropriate referrals for health education or preventive services given if needed, as noted in patient's After Visit Summary  History of Present Illness:     Patient presents for a Medicare Wellness Visit    Here for awv and follow up  Had really bad depression last winter  Insomnia  Diagnosed with sleep apnea  Also had vertigo- wasn't exercising  Has 2 new dogs  Had covid in July      Hyperlipidemia  This is a chronic problem  The current episode started more than 1 year ago  The problem is uncontrolled  Recent lipid tests were reviewed and are variable  Exacerbating diseases include obesity  There are no known factors aggravating her hyperlipidemia  Associated symptoms include shortness of breath (with exertion)  Current antihyperlipidemic treatment includes statins  The current treatment provides significant improvement of lipids  There are no compliance problems  Patient Care Team:  Earnestine Diaz DO as PCP - General  DO Marleny Gutierrez MD Jesslyn Quam, MD (Psychiatry)     Review of Systems:     Review of Systems   Constitutional: Negative  HENT: Negative  Eyes: Negative  Respiratory: Positive for shortness of breath (with exertion)  Cardiovascular: Negative  Gastrointestinal: Negative  Endocrine: Negative  Genitourinary: Negative  Musculoskeletal: Negative  Allergic/Immunologic: Negative  Neurological: Negative  Hematological: Negative      Psychiatric/Behavioral: Positive for decreased concentration and sleep disturbance  The patient is nervous/anxious  Problem List:     Patient Active Problem List   Diagnosis   • Hyperlipidemia   • Anxiety state   • Benign essential hypertension   • Bipolar I disorder, single manic episode (HCC)   • Gastroesophageal reflux disease   • IFG (impaired fasting glucose)   • Nonalcoholic fatty liver disease   • Vitamin D deficiency   • Medicare annual wellness visit, subsequent   • Screening for malignant neoplasm of breast   • Encounter for immunization   • Severe obesity (BMI 35 0-39  9) with comorbidity ([facility] )   • Need for vaccination   • SOB (shortness of breath)   • Atypical chest pain   • Pelvic floor dysfunction in female   • Sleep apnea   • History of COVID-19      Past Medical and Surgical History:     Past Medical History:   Diagnosis Date   • Anxiety    • [facility]    • Depression    • Fatty liver    • Glaucoma    • Hyperlipidemia    • Hypertension    • Iron deficiency anemia secondary to inadequate dietary iron intake 06/27/2012   • Lyme disease    • Otitis of left ear 02/10/2020   • Psychiatric disorder    • Shortness of breath on exertion    • Sleep apnea 05/2022   • Trouble in sleeping    • Vitamin D deficiency      Past Surgical History:   Procedure Laterality Date   • ADENOIDECTOMY     • ENDOMETRIAL ABLATION     • HERNIA REPAIR     • TONSILLECTOMY     • WISDOM TOOTH EXTRACTION        Family History:     Family History   Problem Relation Age of Onset   • Dementia Mother    • Hypertension Mother    • Stroke Mother    • Melanoma Mother 61   • Anxiety disorder Father         NOS   • Coronary artery disease Father    • Diabetes Father         mellitus   • Hyperlipidemia Father         pure hypercholesterolemia   • Melanoma Father 61   • Hyperlipidemia Sister    • Lupus Sister    • Diabetes Sister         mellitus   • Diverticulitis Brother    • Stroke Maternal Grandmother    • Cancer Paternal Grandfather         unknown type   • Thyroid disease Neg Hx Social History:     Social History     Socioeconomic History   • Marital status: /Civil Union     Spouse name: None   • Number of children: None   • Years of education: None   • Highest education level: None   Occupational History   • None   Tobacco Use   • Smoking status: Former Smoker     Types: Cigarettes     Quit date: 2017     Years since quittin 8   • Smokeless tobacco: Never Used   Vaping Use   • Vaping Use: Never used   Substance and Sexual Activity   • Alcohol use: Yes     Comment: rare, few times per year   • Drug use: No   • Sexual activity: Yes     Partners: Male   Other Topics Concern   • None   Social History Narrative   • None     Social Determinants of Health     Financial Resource Strain: High Risk   • Difficulty of Paying Living Expenses: Very hard   Food Insecurity: Not on file   Transportation Needs: No Transportation Needs   • Lack of Transportation (Medical): No   • Lack of Transportation (Non-Medical):  No   Physical Activity: Not on file   Stress: Not on file   Social Connections: Not on file   Intimate Partner Violence: Not on file   Housing Stability: Not on file      Medications and Allergies:     Current Outpatient Medications   Medication Sig Dispense Refill   • Biotin 10 MG TABS Take by mouth     • buPROPion (WELLBUTRIN SR) 200 MG 12 hr tablet Take 1 tablet by mouth daily     • clonazePAM (KlonoPIN) 0 5 mg tablet       • FLUoxetine (PROzac) 40 MG capsule Take 1 capsule by mouth daily     • gemfibrozil (LOPID) 600 mg tablet Take 1 tablet (600 mg total) by mouth 2 (two) times a day 60 tablet 3   • labetalol (NORMODYNE) 100 mg tablet TAKE 1 TABLET(100 MG) BY MOUTH EVERY 12 HOURS 60 tablet 0   • lisinopril (ZESTRIL) 10 mg tablet TAKE 1 TABLET(10 MG) BY MOUTH DAILY 90 tablet 3   • meclizine (ANTIVERT) 25 mg tablet Take 1 tablet (25 mg total) by mouth 3 (three) times a day as needed for dizziness 30 tablet 0   • Multiple Vitamins-Minerals (WOMENS 50+ MULTI VITAMIN/MIN) TABS Take by mouth     • rosuvastatin (CRESTOR) 10 MG tablet Take 1 tablet (10 mg total) by mouth daily 30 tablet 5   • VITAMIN D, ERGOCALCIFEROL, PO Take by mouth Taking 2,000 daily     • Omega-3 Fatty Acids (FISH OIL EXTRA STRENGTH PO) Take by mouth Taking 2 in the morning and 2 in the evening (Patient not taking: Reported on 11/7/2022)       No current facility-administered medications for this visit  Allergies   Allergen Reactions   • Atorvastatin Confusion   • Penicillins Swelling and Rash      Immunizations:     Immunization History   Administered Date(s) Administered   • COVID-19 PFIZER VACCINE 0 3 ML IM 03/30/2021, 04/22/2021, 12/21/2021   • INFLUENZA 10/16/2014, 02/23/2017, 12/10/2018   • Influenza Quadrivalent Preservative Free 3 years and older IM 10/16/2014, 02/23/2017   • Influenza, injectable, quadrivalent, preservative free 0 5 mL 12/10/2018   • Influenza, recombinant, quadrivalent,injectable, preservative free 02/17/2020, 11/17/2020, 11/01/2021, 11/07/2022      Health Maintenance:         Topic Date Due   • Hepatitis C Screening  Never done   • HIV Screening  Never done   • Cervical Cancer Screening  04/05/2022   • Breast Cancer Screening: Mammogram  04/08/2022   • Colorectal Cancer Screening  11/06/2024         Topic Date Due   • COVID-19 Vaccine (4 - Booster for Pfizer series) 04/21/2022      Medicare Screening Tests and Risk Assessments:     Jose Killian is here for her Subsequent Wellness visit  Health Risk Assessment:   Patient rates overall health as fair  Patient feels that their physical health rating is slightly worse  Patient is dissatisfied with their life  Eyesight was rated as same  Hearing was rated as same  Patient feels that their emotional and mental health rating is same  Patients states they are never, rarely angry  Patient states they are sometimes unusually tired/fatigued  Pain experienced in the last 7 days has been some  Patient's pain rating has been 3/10   Patient states that she has experienced weight loss or gain in last 6 months  Depression Screening:   PHQ-2 Score: 4  PHQ-9 Score: 13      Fall Risk Screening: In the past year, patient has experienced: no history of falling in past year      Urinary Incontinence Screening:   Patient has not leaked urine accidently in the last six months  Home Safety:  Patient does not have trouble with stairs inside or outside of their home  Patient has working smoke alarms and has working carbon monoxide detector  Home safety hazards include: loose rugs on the floor, household clutter and poor household lighting  Nutrition:   Current diet is Unhealthy  Patient is eating high carb, high fat and junk foods  Medications:   Patient is currently taking over-the-counter supplements  OTC medications include: B complex, Biotin, Vitamin D  Patient is able to manage medications  Activities of Daily Living (ADLs)/Instrumental Activities of Daily Living (IADLs):   Walk and transfer into and out of bed and chair?: Yes  Dress and groom yourself?: Yes    Bathe or shower yourself?: Yes    Feed yourself?  Yes  Do your laundry/housekeeping?: Yes  Manage your money, pay your bills and track your expenses?: Yes  Make your own meals?: Yes    Do your own shopping?: Yes    [address]    Previous Hospitalizations:   Any hospitalizations or ED visits within the last 12 months?: No      Advance Care Planning:   Living will: No    Durable POA for healthcare: No    Advanced directive: No      Cognitive Screening:   Provider or family/friend/caregiver concerned regarding cognition?: No    PREVENTIVE SCREENINGS      Cardiovascular Screening:    General: Screening Not Indicated and History Lipid Disorder      Diabetes Screening:     General: Screening Current      Colorectal Cancer Screening:     General: Screening Current      Breast Cancer Screening:     General: Screening Current      Lung Cancer Screening:     General: Screening Not Indicated      Hepatitis C Screening:    General: Risks and Benefits Discussed    Screening, Brief Intervention, and Referral to Treatment (SBIRT)    Screening  Typical number of drinks in a day: 0  Typical number of drinks in a week: 0  Interpretation: Low risk drinking behavior  AUDIT-C Screenin) How often did you have a drink containing alcohol in the past year? monthly or less  2) How many drinks did you have on a typical day when you were drinking in the past year? 0  3) How often did you have 6 or more drinks on one occasion in the past year? never    AUDIT-C Score: 1  Interpretation: Score 0-2 (female): Negative screen for alcohol misuse    Single Item Drug Screening:  How often have you used an illegal drug (including marijuana) or a prescription medication for non-medical reasons in the past year? never    Single Item Drug Screen Score: 0  Interpretation: Negative screen for possible drug use disorder    No exam data present     Physical Exam:     /80 (BP Location: Left arm, Patient Position: Sitting, Cuff Size: Large)   Pulse 103   Temp (!) 96 9 °F (36 1 °C) (Tympanic)   Resp 16   Ht 5' 6 46" (1 688 m)   Wt 108 kg (238 lb 3 2 oz)   SpO2 98%   BMI 37 92 kg/m²     Physical Exam  Vitals and nursing note reviewed  Constitutional:       Appearance: Normal appearance  She is well-developed  HENT:      Head: Normocephalic and atraumatic  Right Ear: External ear normal       Left Ear: External ear normal       Nose: Nose normal    Eyes:      Conjunctiva/sclera: Conjunctivae normal       Pupils: Pupils are equal, round, and reactive to light  Cardiovascular:      Rate and Rhythm: Normal rate and regular rhythm  Heart sounds: Normal heart sounds  Pulmonary:      Effort: Pulmonary effort is normal       Breath sounds: Normal breath sounds  Abdominal:      General: Abdomen is flat  Bowel sounds are normal       Palpations: Abdomen is soft  Musculoskeletal:         General: Normal range of motion  Cervical back: Normal range of motion and neck supple  Skin:     General: Skin is warm and dry  Capillary Refill: Capillary refill takes less than 2 seconds  Neurological:      General: No focal deficit present  Mental Status: She is alert and oriented to person, place, and time  Psychiatric:         Mood and Affect: Mood normal          Behavior: Behavior normal          Thought Content:  Thought content normal          Judgment: Judgment normal           Reda Shines, DO

## 2022-11-07 NOTE — PATIENT INSTRUCTIONS
Medicare Preventive Visit Patient Instructions  Thank you for completing your Welcome to Medicare Visit or Medicare Annual Wellness Visit today  Your next wellness visit will be due in one year (11/8/2023)  The screening/preventive services that you may require over the next 5-10 years are detailed below  Some tests may not apply to you based off risk factors and/or age  Screening tests ordered at today's visit but not completed yet may show as past due  Also, please note that scanned in results may not display below  Preventive Screenings:  Service Recommendations Previous Testing/Comments   Colorectal Cancer Screening  * Colonoscopy    * Fecal Occult Blood Test (FOBT)/Fecal Immunochemical Test (FIT)  * Fecal DNA/Cologuard Test  * Flexible Sigmoidoscopy Age: 39-70 years old   Colonoscopy: every 10 years (may be performed more frequently if at higher risk)  OR  FOBT/FIT: every 1 year  OR  Cologuard: every 3 years  OR  Sigmoidoscopy: every 5 years  Screening may be recommended earlier than age 39 if at higher risk for colorectal cancer  Also, an individualized decision between you and your healthcare provider will decide whether screening between the ages of 74-80 would be appropriate  Colonoscopy: 11/06/2014  FOBT/FIT: Not on file  Cologuard: Not on file  Sigmoidoscopy: Not on file    Screening Current     Breast Cancer Screening Age: 36 years old  Frequency: every 1-2 years  Not required if history of left and right mastectomy Mammogram: 04/08/2021    Screening Current   Cervical Cancer Screening Between the ages of 21-29, pap smear recommended once every 3 years  Between the ages of 33-67, can perform pap smear with HPV co-testing every 5 years     Recommendations may differ for women with a history of total hysterectomy, cervical cancer, or abnormal pap smears in past  Pap Smear: 04/05/2019        Hepatitis C Screening Once for adults born between 1945 and 1965  More frequently in patients at high risk for Hepatitis C Hep C Antibody: Not on file        Diabetes Screening 1-2 times per year if you're at risk for diabetes or have pre-diabetes Fasting glucose: 118 mg/dL (9/28/2022)  A1C: 6 0 % (9/28/2022)  Screening Current   Cholesterol Screening Once every 5 years if you don't have a lipid disorder  May order more often based on risk factors  Lipid panel: 09/28/2022    Screening Not Indicated  History Lipid Disorder     Other Preventive Screenings Covered by Medicare:  1  Abdominal Aortic Aneurysm (AAA) Screening: covered once if your at risk  You're considered to be at risk if you have a family history of AAA  2  Lung Cancer Screening: covers low dose CT scan once per year if you meet all of the following conditions: (1) Age 50-69; (2) No signs or symptoms of lung cancer; (3) Current smoker or have quit smoking within the last 15 years; (4) You have a tobacco smoking history of at least 20 pack years (packs per day multiplied by number of years you smoked); (5) You get a written order from a healthcare provider  3  Glaucoma Screening: covered annually if you're considered high risk: (1) You have diabetes OR (2) Family history of glaucoma OR (3)  aged 48 and older OR (3)  American aged 72 and older  3  Osteoporosis Screening: covered every 2 years if you meet one of the following conditions: (1) You're estrogen deficient and at risk for osteoporosis based off medical history and other findings; (2) Have a vertebral abnormality; (3) On glucocorticoid therapy for more than 3 months; (4) Have primary hyperparathyroidism; (5) On osteoporosis medications and need to assess response to drug therapy  · Last bone density test (DXA Scan): Not on file  5  HIV Screening: covered annually if you're between the age of 12-76  Also covered annually if you are younger than 13 and older than 72 with risk factors for HIV infection   For pregnant patients, it is covered up to 3 times per pregnancy  Immunizations:  Immunization Recommendations   Influenza Vaccine Annual influenza vaccination during flu season is recommended for all persons aged >= 6 months who do not have contraindications   Pneumococcal Vaccine   * Pneumococcal conjugate vaccine = PCV13 (Prevnar 13), PCV15 (Vaxneuvance), PCV20 (Prevnar 20)  * Pneumococcal polysaccharide vaccine = PPSV23 (Pneumovax) Adults 25-60 years old: 1-3 doses may be recommended based on certain risk factors  Adults 72 years old: 1-2 doses may be recommended based off what pneumonia vaccine you previously received   Hepatitis B Vaccine 3 dose series if at intermediate or high risk (ex: diabetes, end stage renal disease, liver disease)   Tetanus (Td) Vaccine - COST NOT COVERED BY MEDICARE PART B Following completion of primary series, a booster dose should be given every 10 years to maintain immunity against tetanus  Td may also be given as tetanus wound prophylaxis  Tdap Vaccine - COST NOT COVERED BY MEDICARE PART B Recommended at least once for all adults  For pregnant patients, recommended with each pregnancy  Shingles Vaccine (Shingrix) - COST NOT COVERED BY MEDICARE PART B  2 shot series recommended in those aged 48 and above     Health Maintenance Due:      Topic Date Due   • Hepatitis C Screening  Never done   • HIV Screening  Never done   • Cervical Cancer Screening  04/05/2022   • Breast Cancer Screening: Mammogram  04/08/2022   • Colorectal Cancer Screening  11/06/2024     Immunizations Due:      Topic Date Due   • COVID-19 Vaccine (4 - Booster for Pfizer series) 04/21/2022   • Influenza Vaccine (1) 09/01/2022     Advance Directives   What are advance directives? Advance directives are legal documents that state your wishes and plans for medical care  These plans are made ahead of time in case you lose your ability to make decisions for yourself   Advance directives can apply to any medical decision, such as the treatments you want, and if you want to donate organs  What are the types of advance directives? There are many types of advance directives, and each state has rules about how to use them  You may choose a combination of any of the following:  · Living will: This is a written record of the treatment you want  You can also choose which treatments you do not want, which to limit, and which to stop at a certain time  This includes surgery, medicine, IV fluid, and tube feedings  · Durable power of  for healthcare [facility]): This is a written record that states who you want to make healthcare choices for you when you are unable to make them for yourself  This person, called a proxy, is usually a family member or a friend  You may choose more than 1 proxy  · Do not resuscitate (DNR) order:  A DNR order is used in case your heart stops beating or you stop breathing  It is a request not to have certain forms of treatment, such as CPR  A DNR order may be included in other types of advance directives  · Medical directive: This covers the care that you want if you are in a coma, near death, or unable to make decisions for yourself  You can list the treatments you want for each condition  Treatment may include pain medicine, surgery, blood transfusions, dialysis, IV or tube feedings, and a ventilator (breathing machine)  · Values history: This document has questions about your views, beliefs, and how you feel and think about life  This information can help others choose the care that you would choose  Why are advance directives important? An advance directive helps you control your care  Although spoken wishes may be used, it is better to have your wishes written down  Spoken wishes can be misunderstood, or not followed  Treatments may be given even if you do not want them  An advance directive may make it easier for your family to make difficult choices about your care     Depression   Depression  is a medical condition that causes feelings of sadness or hopelessness that do not go away  Depression may cause you to lose interest in things you used to enjoy  These feelings may interfere with your daily life  Call your local emergency number (911 in the [address]) if:   · You think about harming yourself or someone else  · You have done something on purpose to hurt yourself  The following resources are available at any time to help you, if needed:   · [facility]: 4-584.466.1683 (4-122-488-GZSL)   · Suicide Hotline: 0-757.408.1969 (7-042-QCAAQKE)   · For a list of international numbers: https://save org/find-help/international-resources/  Treatment for depression may include medicine to relieve depression  Medicine is often used together with therapy  Therapy is a way for you to talk about your feelings and anything that may be causing depression  Therapy can be done alone or in a group  It may also be done with family members or a significant other  · Get regular physical activity  · Create a regular sleep schedule  · Eat a variety of healthy foods  · Do not drink alcohol or use drugs  Weight Management   Why it is important to manage your weight:  Being overweight increases your risk of health conditions such as heart disease, high blood pressure, type 2 diabetes, and certain types of cancer  It can also increase your risk for osteoarthritis, sleep apnea, and other respiratory problems  Aim for a slow, steady weight loss  Even a small amount of weight loss can lower your risk of health problems  How to lose weight safely:  A safe and healthy way to lose weight is to eat fewer calories and get regular exercise  You can lose up about 1 pound a week by decreasing the number of calories you eat by 500 calories each day  Healthy meal plan for weight management:  A healthy meal plan includes a variety of foods, contains fewer calories, and helps you stay healthy   A healthy meal plan includes the following:  · Eat whole-grain foods more often  A healthy meal plan should contain fiber  Fiber is the part of grains, fruits, and vegetables that is not broken down by your body  Whole-grain foods are healthy and provide extra fiber in your diet  Some examples of whole-grain foods are whole-wheat breads and pastas, oatmeal, brown rice, and bulgur  · Eat a variety of vegetables every day  Include dark, leafy greens such as spinach, kale, andre greens, and mustard greens  Eat yellow and orange vegetables such as carrots, sweet potatoes, and winter squash  · Eat a variety of fruits every day  Choose fresh or canned fruit (canned in its own juice or light syrup) instead of juice  Fruit juice has very little or no fiber  · Eat low-fat dairy foods  Drink fat-free (skim) milk or 1% milk  Eat fat-free yogurt and low-fat cottage cheese  Try low-fat cheeses such as mozzarella and other reduced-fat cheeses  · Choose meat and other protein foods that are low in fat  Choose beans or other legumes such as split peas or lentils  Choose fish, skinless poultry (chicken or turkey), or lean cuts of red meat (beef or pork)  Before you cook meat or poultry, cut off any visible fat  · Use less fat and oil  Try baking foods instead of frying them  Add less fat, such as margarine, sour cream, regular salad dressing and mayonnaise to foods  Eat fewer high-fat foods  Some examples of high-fat foods include french fries, doughnuts, ice cream, and cakes  · Eat fewer sweets  Limit foods and drinks that are high in sugar  This includes candy, cookies, regular soda, and sweetened drinks  Exercise:  Exercise at least 30 minutes per day on most days of the week  Some examples of exercise include walking, biking, dancing, and swimming  You can also fit in more physical activity by taking the stairs instead of the elevator or parking farther away from stores  Ask your healthcare provider about the best exercise plan for you        © Copyright [employer] 2018 Information is for [employer] use only and may not be sold, redistributed or otherwise used for commercial purposes   All illustrations and images included in CareNotes® are the copyrighted property of [employer]  or [facility] [address]

## 2022-11-15 ENCOUNTER — OFFICE VISIT (OUTPATIENT)
Dept: PHYSICAL THERAPY | Facility: REHABILITATION | Age: 57
End: 2022-11-15

## 2022-11-15 DIAGNOSIS — K59.02 CONSTIPATION DUE TO OUTLET DYSFUNCTION: ICD-10-CM

## 2022-11-15 DIAGNOSIS — R32 URINARY INCONTINENCE, UNSPECIFIED TYPE: Primary | ICD-10-CM

## 2022-11-15 DIAGNOSIS — R10.2 PELVIC PAIN: ICD-10-CM

## 2022-11-15 NOTE — PROGRESS NOTES
Daily Note     Today's date: 11/15/2022  Patient name: Claude Ortiz  : 1965  MRN: [id number]  Referring provider: CAITLYN Ohara  Dx:   Encounter Diagnosis     ICD-10-CM    1  Urinary incontinence, unspecified type  R32    2  Constipation due to outlet dysfunction  K59 02    3  Pelvic pain  R10 2                   Subjective: Progressed to 90 min void interval without UI  Hip and back pain is worse, due to increased bending and lifting with new dogs  Objective: See treatment diary below    Assessment: Tolerated treatment well  Continued PF STM for downtraining; low resting tone of urogenital layer 1-2, but hypertonus levator ani  Overall, high sensitivity throughout and poor power of layer 1-3 contraction  Improved use of DB for relaxation and pain control and coordination with colt  Plan: Continue per plan of care  Precautions: standard    Manuals 9/29 10/3 10/18 10/26 11/7 11/14     PFM exam           PF STM  defered at this time hold  External 10 min UGD   Internal 10' layer 1 bilaterally, gentle 40'     Bladder MFR     Suprapubic 10 5'                Neuro Re-Ed           sEMG             DB  10' cueing with her own hands Supine 5 min  CP 5 min  DKTC 5 min Supine 5 min  DKTC 5 min  Adductor butterfly 5min Supine 5 min  DKTC 5 min  Adductor butterfly 5min 5 min supine     downtraining           Pelvic drop  5'         PFM cxn  10x  x3 w exhale 2x3 w tactile BFB 2x5 tactile BFB     Urge deferral    Practiced; handout provided reviewed reviewed                           Ther Ex           TM      8 min 2 5 mph                                                  Ther Activity             Defecation mechanics  Squatty potty, breathing, leaning forward reviewed        Bladder diaries   provided        Bladder retraining    45 min interval, scheduled voiding   60min interval 90 min     dilators    Quick talk discussed role in POC

## 2022-11-22 ENCOUNTER — OFFICE VISIT (OUTPATIENT)
Dept: PHYSICAL THERAPY | Facility: REHABILITATION | Age: 57
End: 2022-11-22

## 2022-11-22 DIAGNOSIS — R32 URINARY INCONTINENCE, UNSPECIFIED TYPE: Primary | ICD-10-CM

## 2022-11-22 DIAGNOSIS — R10.2 PELVIC PAIN: ICD-10-CM

## 2022-11-22 DIAGNOSIS — K59.02 CONSTIPATION DUE TO OUTLET DYSFUNCTION: ICD-10-CM

## 2022-11-22 NOTE — PROGRESS NOTES
Daily Note     Today's date: 2022  Patient name: Laura Handley  : 1965  MRN: [id number]  Referring provider: CAITLYN King  Dx:   Encounter Diagnosis     ICD-10-CM    1  Urinary incontinence, unspecified type  R32       2  Constipation due to outlet dysfunction  K59 02       3  Pelvic pain  R10 2                      Subjective: Drank 16 oz water yesterday, and recognizes that this contributes to vulvovaginal irritation today  90 minute void interval successful some of the time  Objective: See treatment diary below    Assessment: Tolerated treatment well  Ongoing levator ani hypertonicity and urogenital diaphragm weakness  Overall increased irritability of layer 1-3 today  Plan: Continue per plan of care  Precautions: standard    Manuals 9/29 10/3 10/18 10/26 11/7 11/14 11/22    PFM exam           PF STM  defered at this time hold  External 10 min UGD   Internal 10' layer 1 bilaterally, gentle 40' 30' layer 1-3    Bladder MFR     Suprapubic 10 5' 5'               Neuro Re-Ed           sEMG             DB  10' cueing with her own hands Supine 5 min  CP 5 min  DKTC 5 min Supine 5 min  DKTC 5 min  Adductor butterfly 5min Supine 5 min  DKTC 5 min  Adductor butterfly 5min 5 min supine DKTC 2 min    downtraining           Pelvic drop  5'         PFM cxn  10x  x3 w exhale 2x3 w tactile BFB 2x5 tactile BFB 2x5 tactile BFB    Urge deferral    Practiced; handout provided reviewed reviewed Reviewed distraction, added DB                          Ther Ex           TM      8 min 2 5 mph                                                  Ther Activity             Defecation mechanics  Squatty potty, breathing, leaning forward reviewed        Bladder diaries   provided        Bladder retraining    45 min interval, scheduled voiding   60min interval 90 min 90 min    dilators    Quick talk discussed role in POC

## 2022-11-29 ENCOUNTER — APPOINTMENT (OUTPATIENT)
Dept: PHYSICAL THERAPY | Facility: REHABILITATION | Age: 57
End: 2022-11-29

## 2022-12-06 ENCOUNTER — OFFICE VISIT (OUTPATIENT)
Dept: PHYSICAL THERAPY | Facility: REHABILITATION | Age: 57
End: 2022-12-06

## 2022-12-06 DIAGNOSIS — R10.2 PELVIC PAIN: ICD-10-CM

## 2022-12-06 DIAGNOSIS — R32 URINARY INCONTINENCE, UNSPECIFIED TYPE: Primary | ICD-10-CM

## 2022-12-06 DIAGNOSIS — K59.02 CONSTIPATION DUE TO OUTLET DYSFUNCTION: ICD-10-CM

## 2022-12-06 NOTE — PROGRESS NOTES
Daily Note     Today's date: 2022  Patient name: Soila Pham  : 1965  MRN: [id number]  Referring provider: CAITLYN Prince  Dx:   Encounter Diagnosis     ICD-10-CM    1  Urinary incontinence, unspecified type  R32       2  Constipation due to outlet dysfunction  K59 02       3  Pelvic pain  R10 2         Patient treated by Zi Walton, SPT, under direct supervision by Nellie Fleming PT, DPT  Subjective: Patient reported feeling side effects from her new statin medication that includes: difficulty navigating stairs, general back and hip pain, as well as brain fog  As a result, she had inconsistent bladder habits and water intake  Objective: See treatment diary below      Assessment: Tolerated treatment well  Patient demonstrated fatigue post treatment, exhibited good technique with therapeutic exercises and would benefit from continued PT  Patient required cueing during all exercises with breath sequencing and technique; patient particularly felt hip pain relief with hip flexor stretching, and a decrease in back pain with the LTRs  Patient has strength deficits noted throughout bilateral lower extremities with visible fatigue noted in quads  Plan: Continue per plan of care  Plan to progress below exercises (focus on glute and hip, TrA strengthening) should patient be able to tolerate due to back and hip pain  Consider prone activity next visit        Precautions: standard    Manuals 10/26 11/7 11/14 11/22 12/6   PFM exam        PF STM  External 10 min UGD   Internal 10' layer 1 bilaterally, gentle 40' 30' layer 1-3    Bladder MFR  Suprapubic 10 5' 5'            Neuro Re-Ed        sEMG          DB Supine 5 min  DKTC 5 min  Adductor butterfly 5min Supine 5 min  DKTC 5 min  Adductor butterfly 5min 5 min supine DKTC 2 min DKTC 2' + butterfly and piriformis stretch 5'   TrA activation     TrA activation in h/l 3'   Pelvic drop        PFM cxn x3 w exhale 2x3 w tactile BFB 2x5 tactile BFB 2x5 tactile BFB    Urge deferral Practiced; handout provided reviewed reviewed Reviewed distraction, added DB                    Ther Ex        TM   8 min 2 5 mph             Hip abd/add 2x10 each        LTRs 20x3"        Butterfly stretch and R piriformis stretch 3x30" each        L leg eccentric SLR 2x5        Bridges 3x5        B/l hip flexor stretch 3x30"        H/l march 30x   Ther Activity          Defecation mechanics        Bladder diaries        Bladder retraining 45 min interval, scheduled voiding   60min interval   90 min 90 min    dilators

## 2022-12-13 ENCOUNTER — OFFICE VISIT (OUTPATIENT)
Dept: PHYSICAL THERAPY | Facility: REHABILITATION | Age: 57
End: 2022-12-13

## 2022-12-13 DIAGNOSIS — R10.2 PELVIC PAIN: ICD-10-CM

## 2022-12-13 DIAGNOSIS — K59.02 CONSTIPATION DUE TO OUTLET DYSFUNCTION: ICD-10-CM

## 2022-12-13 DIAGNOSIS — R32 URINARY INCONTINENCE, UNSPECIFIED TYPE: Primary | ICD-10-CM

## 2022-12-13 NOTE — PROGRESS NOTES
Daily Note     Today's date: 2022  Patient name: Ramon Gunn  : 1965  MRN: [id number]  Referring provider: CAITLYN Chicas  Dx:   Encounter Diagnosis     ICD-10-CM    1  Urinary incontinence, unspecified type  R32       2  Constipation due to outlet dysfunction  K59 02       3  Pelvic pain  R10 2                      Subjective: 90 minute void interval is going well, mildly difficult but without UI  No urethral burning with urination  + sensation of incomplete emptying 50% of the time  Reports new onset of fecal smearing, notes that she is more constipated lately  Water intake continues to be very low (12-16 oz) "I think the brain fog from the statin is continuing "    Objective: See treatment diary below    Assessment: Tolerated treatment well  Session focused on strengthening exercise with resistance tubing for upper quarter, lower quarter, and abdominal wall  Good breath coordination (no gilbert luz)  Plan: Continue per plan of care  Precautions: standard  Access Code: [email or web address]  URL: [email or web address]  Date: 2022  Prepared by: Lyla Vaughan  Exercises  Scaption with Resistance - 3 sets - 10 reps  Standing Shoulder Flexion with Resistance - 3 sets - 10 reps  Squatting Shoulder Row with Anchored Resistance - 3 sets - 10 reps  Shoulder extension with resistance - Neutral - 3 sets - 10 reps  X Band Walk - 3 sets - 10 reps  Standing Mini Squat with Resistance - 3 sets - 10 reps    Manuals 10/26 11/7 11/14 11/22 12/6 12/13    PFM exam          PF STM  External 10 min UGD   Internal 10' layer 1 bilaterally, gentle 40' 30' layer 1-3      Bladder MFR  Suprapubic 10 5' 5'                Neuro Re-Ed          sEMG            DB Supine 5 min  DKTC 5 min  Adductor butterfly 5min Supine 5 min  DKTC 5 min  Adductor butterfly 5min 5 min supine DKTC 2 min DKTC 2' + butterfly and piriformis stretch 5'     TrA activation     TrA activation in h/l 3'     Pelvic drop PFM cxn x3 w exhale 2x3 w tactile BFB 2x5 tactile BFB 2x5 tactile BFB      Urge deferral Practiced; handout provided reviewed reviewed Reviewed distraction, added DB                          Ther Ex          TM   8 min 2 5 mph    NuStep 14' L3    Strengthening exercise       Hip abd/add 2x10 each Squat in place  Resisted standing hip abd 3x5 steps each direction  March in place with shldr ext         LTRs 20x3"          Butterfly stretch and R piriformis stretch 3x30" each          L leg eccentric SLR 2x5          Bridges 3x5          B/l hip flexor stretch 3x30"          H/l march 30x Bicep curl  Bent over row  scaption w band    Ther Activity            Defecation mechanics          Bladder diaries          Bladder retraining 45 min interval, scheduled voiding   60min interval   90 min 90 min      dilators

## 2022-12-19 ENCOUNTER — OFFICE VISIT (OUTPATIENT)
Dept: PHYSICAL THERAPY | Facility: REHABILITATION | Age: 57
End: 2022-12-19

## 2022-12-19 DIAGNOSIS — R10.2 PELVIC PAIN: ICD-10-CM

## 2022-12-19 DIAGNOSIS — R32 URINARY INCONTINENCE, UNSPECIFIED TYPE: ICD-10-CM

## 2022-12-19 DIAGNOSIS — K59.02 CONSTIPATION DUE TO OUTLET DYSFUNCTION: Primary | ICD-10-CM

## 2022-12-19 NOTE — PROGRESS NOTES
Daily Note     Today's date: 2022  Patient name: Don Carrillo  : 1965  MRN: [id number]  Referring provider: CAITLYN Frye  Dx:   Encounter Diagnosis     ICD-10-CM    1  Constipation due to outlet dysfunction  K59 02       2  Urinary incontinence, unspecified type  R32       3  Pelvic pain  R10 2           Patient treated by Leticia Bustos, SPT, under direct supervision by Max Reyes PT, DPT  Subjective: Patient reports drinking more water (about 40-50oz)  Reports no changes in constipation  Patient is able to achieve 90 minute voiding interval about 60% of the time  Endorsed muscle soreness after last session  Objective: See treatment diary below      Assessment: Tolerated treatment well  Patient exhibited good technique with therapeutic exercises and was challenged with strengthening exercises; required rest breaks in between resistance exercises  Patient with noted difficulty maintaining balance with step up exercises  Patient reported soreness post treatment session and felt relief with supine stretches  Patient would benefit from continued PT  Plan: Continue per plan of care  Re-eval next visit, revisit patients goals perform PFM exam to assess progress  and   Precautions: standard  Access Code: [email or web address]  URL: [email or web address]  Date: 2022  Prepared by: Max Reyes  Exercises  Scaption with Resistance - 3 sets - 10 reps  Standing Shoulder Flexion with Resistance - 3 sets - 10 reps  Squatting Shoulder Row with Anchored Resistance - 3 sets - 10 reps  Shoulder extension with resistance - Neutral - 3 sets - 10 reps  X Band Walk - 3 sets - 10 reps  Standing Mini Squat with Resistance - 3 sets - 10 reps    Manuals 10/26 11/7 11/14 11/22 12/6 12/13 12/19   PFM exam       NV   PF STM  External 10 min UGD   Internal 10' layer 1 bilaterally, gentle 40' 30' layer 1-3      Bladder MFR  Suprapubic 10 5' 5'                Neuro Re-Ed sEMG            DB Supine 5 min  DKTC 5 min  Adductor butterfly 5min Supine 5 min  DKTC 5 min  Adductor butterfly 5min 5 min supine DKTC 2 min DKTC 2' + butterfly and piriformis stretch 5'       TrA activation     TrA activation in h/l 3'     PFM cxn x3 w exhale 2x3 w tactile BFB 2x5 tactile BFB 2x5 tactile BFB      Urge deferral Practiced; handout provided reviewed reviewed Reviewed distraction, added DB                          Ther Ex          TM   8 min 2 5 mph    NuStep 14' L3 Nustep 12' L3   Strengthening exercise       Hip abd/add 2x10 each Squat in place  Resisted standing hip abd 3x5 steps each direction  March in place with shldr ext Repeated from last visit--> + step up and step overs 6" step 20x    +shoulder ABD with resistance bands 1x10          LTRs 20x3"  3x30"        Butterfly stretch and R piriformis stretch 3x30" each  3x30"  +DKTC 3x30"        L leg eccentric SLR 2x5          Bridges 3x5          B/l hip flexor stretch 3x30"  3x30" each        H/l march 30x Bicep curl  Bent over row  scaption w band -->2x10 each with resistance bands   Ther Activity            Defecation mechanics          Bladder diaries          Bladder retraining 45 min interval, scheduled voiding   60min interval   90 min 90 min      dilators

## 2022-12-29 ENCOUNTER — EVALUATION (OUTPATIENT)
Dept: PHYSICAL THERAPY | Facility: REHABILITATION | Age: 57
End: 2022-12-29

## 2022-12-29 DIAGNOSIS — R32 URINARY INCONTINENCE, UNSPECIFIED TYPE: Primary | ICD-10-CM

## 2022-12-29 DIAGNOSIS — K59.02 CONSTIPATION DUE TO OUTLET DYSFUNCTION: ICD-10-CM

## 2022-12-29 DIAGNOSIS — R10.2 PELVIC PAIN: ICD-10-CM
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
## 2023-01-05 ENCOUNTER — OFFICE VISIT (OUTPATIENT)
Dept: PHYSICAL THERAPY | Facility: REHABILITATION | Age: 58
End: 2023-01-05

## 2023-01-05 DIAGNOSIS — K59.02 CONSTIPATION DUE TO OUTLET DYSFUNCTION: Primary | ICD-10-CM

## 2023-01-05 DIAGNOSIS — R10.2 PELVIC PAIN: ICD-10-CM

## 2023-01-05 DIAGNOSIS — R32 URINARY INCONTINENCE, UNSPECIFIED TYPE: ICD-10-CM

## 2023-01-05 NOTE — PROGRESS NOTES
Daily Note     Today's date: 2023  Patient name: Kelley Rose  : 1965  MRN: [id number]  Referring provider: CAITLYN Ye  Dx:   Encounter Diagnosis     ICD-10-CM    1  Constipation due to outlet dysfunction  K59 02       2  Urinary incontinence, unspecified type  R32       3  Pelvic pain  R10 2           Patient treated by Joanna Ontiveros SPT, under direct supervision by Robin Morillo PT, DPT  Subjective: Patient reported two episodes of UI since last visit, one as she was going up the stairs, brought on due to a lot of heavy lifting and moving earlier that day  Able to push void intervals to 90 minute intervals, however unable to go further without UI/leakage occurring  Objective: See treatment diary below      Assessment: Tolerated treatment well  L hip pain increased slightly during stretching, but resolves with rest and change of hip position out of abduction  Patient reported to SPT sensation of pelvic drop in various positions/stretches, and was able to verbalize when the sensation was not felt  Patient also counseled on caffeine and reintroduction or tapering of caffeineated beverages and the role of bladder irritants  Patient exhibited good technique with therapeutic exercises and would benefit from continued PT      Plan: Continue per plan of care  Continue stretching (supported) of the pelvic floor and breath coordination/ gentle bear down mechanics  Consider biofeedback next session to assess relaxation of PFMs as well as gentle hip, lumbopelvic mobility  Precautions: standard  Access Code: [email or web address]  URL: [email or web address]  Date: 2023  Prepared by: Robin Morillo  Exercises  • Runner's Step Up/Down - 1 x daily - 7 x weekly - 2 sets - 10 reps  • Lateral Step Up - 1 x daily - 7 x weekly - 2 sets - 10 reps  • Supported Butterfly Stretch with Pelvic Floor Relaxation - 1 x daily - 7 x weekly - 3 sets - 10 reps  • Diaphragmatic Breathing in Supported [facility] with Pelvic Floor Relaxation - 1 x daily - 7 x weekly - 4 reps - 30 seconds hold  • Supported pelvic floor relaxation - 1 x daily - 7 x weekly - 4 reps - 30 seconds hold    Manuals 12/6 12/13 12/19 12/29 1/5    PFM exam   NV performed     PF STM         Bladder MFR                  Neuro Re-Ed         sEMG           DB DKTC 2' + butterfly and piriformis stretch 5'      With feet on wall 10' (modified happy baby)    TrA activation TrA activation in h/l 3'        PFM cxn         Urge deferral                           Ther Ex         TM   NuStep 14' L3 Nustep 12' L3  Nustep 15'    Strengthening exercise Hip abd/add 2x10 each Squat in place  Resisted standing hip abd 3x5 steps each direction  March in place with shldr ext Repeated from last visit--> + step up and step overs 6" step 20x    +shoulder ABD with resistance bands 1x10         LTRs 20x3"  3x30"       Butterfly stretch and R piriformis stretch 3x30" each  3x30"  +DKTC 3x30" DKTC/modified happy baby 3x30" +FADDIR 2x30" DKTC/modified happy baby 3x30" +FADDIR 2x30"  Seated pigeon pose 3' each  Modified figure 4 in sitting 3' each     L leg eccentric SLR 2x5         Bridges 3x5         B/l hip flexor stretch 3x30"  3x30" each 3x30" 3x30"     H/l march 30x Bicep curl  Bent over row  scaption w band -->2x10 each with resistance bands      Ther Activity         Defecation mechanics         Bladder diaries         Bladder retraining     Bladder irritant education provided    dilators
